# Patient Record
Sex: MALE | Race: WHITE | NOT HISPANIC OR LATINO | Employment: OTHER | URBAN - NONMETROPOLITAN AREA
[De-identification: names, ages, dates, MRNs, and addresses within clinical notes are randomized per-mention and may not be internally consistent; named-entity substitution may affect disease eponyms.]

---

## 2020-01-16 ENCOUNTER — OFFICE VISIT (OUTPATIENT)
Dept: CARDIOLOGY CLINIC | Facility: CLINIC | Age: 70
End: 2020-01-16
Payer: MEDICARE

## 2020-01-16 VITALS
BODY MASS INDEX: 32.54 KG/M2 | WEIGHT: 253.53 LBS | SYSTOLIC BLOOD PRESSURE: 128 MMHG | HEIGHT: 74 IN | DIASTOLIC BLOOD PRESSURE: 62 MMHG | HEART RATE: 82 BPM

## 2020-01-16 DIAGNOSIS — E78.2 MIXED HYPERLIPIDEMIA: ICD-10-CM

## 2020-01-16 DIAGNOSIS — I25.10 CORONARY ARTERY DISEASE INVOLVING NATIVE CORONARY ARTERY OF NATIVE HEART WITHOUT ANGINA PECTORIS: Primary | ICD-10-CM

## 2020-01-16 DIAGNOSIS — Z95.1 S/P CABG (CORONARY ARTERY BYPASS GRAFT): ICD-10-CM

## 2020-01-16 DIAGNOSIS — E66.9 OBESITY (BMI 30-39.9): ICD-10-CM

## 2020-01-16 DIAGNOSIS — I48.0 PAF (PAROXYSMAL ATRIAL FIBRILLATION) (HCC): ICD-10-CM

## 2020-01-16 DIAGNOSIS — I34.0 NON-RHEUMATIC MITRAL REGURGITATION: ICD-10-CM

## 2020-01-16 DIAGNOSIS — I65.21 CAROTID OCCLUSION, RIGHT: ICD-10-CM

## 2020-01-16 DIAGNOSIS — I10 HYPERTENSION, UNSPECIFIED TYPE: ICD-10-CM

## 2020-01-16 PROCEDURE — 99214 OFFICE O/P EST MOD 30 MIN: CPT | Performed by: INTERNAL MEDICINE

## 2020-01-16 RX ORDER — FINASTERIDE 5 MG/1
TABLET, FILM COATED ORAL
COMMUNITY
Start: 2015-01-27

## 2020-01-16 RX ORDER — POTASSIUM CHLORIDE 750 MG/1
CAPSULE, EXTENDED RELEASE ORAL
COMMUNITY
Start: 2016-08-04 | End: 2020-02-06

## 2020-01-16 RX ORDER — SOTALOL HYDROCHLORIDE 120 MG/1
TABLET ORAL
COMMUNITY
Start: 2017-08-01 | End: 2020-02-10 | Stop reason: CLARIF

## 2020-01-16 RX ORDER — ATORVASTATIN CALCIUM 40 MG/1
TABLET, FILM COATED ORAL
COMMUNITY
Start: 2019-10-29 | End: 2021-02-15 | Stop reason: SDUPTHER

## 2020-01-16 RX ORDER — ALLOPURINOL 300 MG/1
300 TABLET ORAL DAILY
COMMUNITY
Start: 2013-01-16

## 2020-01-16 RX ORDER — POTASSIUM CHLORIDE 750 MG/1
10 CAPSULE, EXTENDED RELEASE ORAL DAILY
COMMUNITY
Start: 2019-09-16 | End: 2020-01-16 | Stop reason: CLARIF

## 2020-01-16 RX ORDER — NITROGLYCERIN 0.4 MG/1
TABLET SUBLINGUAL
COMMUNITY
Start: 2014-12-31

## 2020-01-16 RX ORDER — RANITIDINE 150 MG/1
150 TABLET ORAL
COMMUNITY
End: 2021-01-14 | Stop reason: ALTCHOICE

## 2020-01-16 RX ORDER — TAMSULOSIN HYDROCHLORIDE 0.4 MG/1
CAPSULE ORAL
COMMUNITY
Start: 2018-10-25

## 2020-01-16 RX ORDER — CLOPIDOGREL BISULFATE 75 MG/1
TABLET ORAL
COMMUNITY
Start: 2014-02-11 | End: 2021-01-14 | Stop reason: SDUPTHER

## 2020-01-16 RX ORDER — ACETAMINOPHEN 500 MG
TABLET ORAL
COMMUNITY

## 2020-01-16 RX ORDER — PANTOPRAZOLE SODIUM 40 MG/1
TABLET, DELAYED RELEASE ORAL
COMMUNITY
Start: 2014-12-31 | End: 2020-03-09

## 2020-01-16 RX ORDER — WARFARIN SODIUM 5 MG/1
TABLET ORAL
COMMUNITY
Start: 2016-08-04 | End: 2020-01-16 | Stop reason: CLARIF

## 2020-01-16 NOTE — PROGRESS NOTES
Cardiology Office Visit    Olive Kirk  [id number]  1950     [facility] Tracie Downing  [id number] [facility] [address]  [phone]      Dear Tom Escobar MD,    I had the pleasure of seeing your patient at our [address] [place] [address] office today 1/16/2020  As you know he is a pleasant 68y o  year old male with a medical history as described below  Patient previously followed with me at the [address] [place] [address] and at [facility]  Reason for office visit: Reestablish care for coronary artery disease, atrial fibrillation, hypertension, hyperlipidemia, mitral regurgitation and carotid artery disease  1  Coronary artery disease involving native coronary artery of native heart without angina pectoris  Assessment & Plan:  Coronary artery disease:  A  Non ST segment elevation myocardial infarction 10/01/2008  B   2 Taxus drug-eluting stents to the right coronary artery 10/01/2008  C  Moderate left main disease on cardiac catheterization 2008, 2012 and 2014  D  Cardiac catheterization 06/29/2016 with FFR less than 0 75 with left main 50% stenotic  E   CABG x2  LIMA-LAD  SVG-OM 7/29/16    Patient denies any cardiac symptoms at present  He denies chest pain or change in baseline dyspnea on exertion  Nuclear stress test 5/4/18 showed no evidence of ischemia  He is currently on plavix and eliquis so he is not maintained on aspirin  Will plan surveillance stress test around 5/2021 unless symptoms warrant earlier testing  2  S/P CABG (coronary artery bypass graft)  Assessment & Plan:  CABG x 2: LIMA-LAD  SVG-OM 7/29/16  [facility]        3  Hypertension, unspecified type  Assessment & Plan:  Blood pressure is well controlled  Continue current medications  Orders:  -     POCT ECG    4  Mixed hyperlipidemia  Assessment & Plan:  Atorvastatin 40 mg  Lipid panel 11/4/19 reviewed and discussed with patient    LDL 49 and at goal        5  PAF (paroxysmal atrial fibrillation) ([facility] [address] )  Assessment & Plan:  He remains in normal sinus rhythm on Sotalol 120 mg twice daily  ECG today showed normal sinus rhythm  He is now on Eliquis anticoagulation  He follows with EP (Dr Bertha Gutierrez) at [facility] and was last seen 6/2019  6  Non-rheumatic mitral regurgitation  Assessment & Plan:  Echocardiogram 12/22/16 showed mild to moderate central mitral regurgitation  Will plan repeat echocardiogram      Orders:  -     Echo complete with contrast if indicated; Future; Expected date: 01/16/2020    7  Carotid occlusion, right  Assessment & Plan:  Carotid Doppler 06/05/2017 with known occlusion of the right internal carotid artery  50 to 69% stenosis of the left internal carotid artery  Patient is being followed by vascular surgery at [facility] (Dr Ester Alves) with last office visit 11/20/19 and updated carotid Doppler done that day  Repeat carotid in 1 year if stable 2 year follow up with vascular per their recommendations  Continue aggressive medical therapy  Discussed signs and symptoms of stroke with the patient previously  8  Obesity (BMI 30-39  9)  Assessment & Plan:  BMI 33  Discussed the importance of resuming exercise program and trying to maintain a healthy diet  HPI   Patient with history of coronary artery disease dating back to 10/2008 when he had DEIRDRE x 2 placed to an occluded right coronary artery  He ultimately underwent CABG x 2 with LIMA-LAD and SVG-OM 7/29/16  He had post operative atrial fibrillation and follows with Dr Bertha Gutierrez at [facility] for EP  He was last seen 6/2019  He is on sotalol  He has known carotid occlusion for which he follows with Dr Ester Alves (vascular surgery)  He was last seen 11/20/19  I had last seen the patient 7/16/19 at which time he was doing well   He did have some concerns about heart rate elevation with activity which we discussed was normal  I had made no changes to his regimen  1/16/2020: Patient returns today for follow up/reestablish care  He has done well since his last office visit  He was seen by Dr Andrew Loja 11/2019 and 1 year follow up carotid was planned  He denies any chest pain  Stable dyspnea on exertion  No lightheadedness or dizziness  He remains fairly active although not exercising as much as he was previously  He has transitioned to Eliquis from coumadin/warfarin despite it being very costly to him  Patient Active Problem List   Diagnosis    Carotid occlusion, right    CAD (coronary artery disease)    Hypertension    Hyperlipidemia    Non-rheumatic mitral regurgitation    Obesity (BMI 30-39  9)    [facility]oxysmal [facility] fibrillation) ([facility]    S/P CABG (coronary artery bypass graft)     Past Medical History:   Diagnosis Date    Atrial fibrillation (HCC)     Carotid occlusion, right     Coronary artery disease     GERD (gastroesophageal reflux disease)     Hyperlipidemia     Hypertension     Myocardial infarct [facility])      Social History     Socioeconomic History    Marital status: /Civil Union     Spouse name: Not on file    Number of children: Not on file    Years of education: Not on file    Highest education level: Not on file   Occupational History    Occupation: Retired   Social Needs    Financial resource strain: Not on file    Food insecurity:     Worry: Not on file     Inability: Not on file   Brightblue needs:     Medical: Not on file     Non-medical: Not on file   Tobacco Use    Smoking status: Former Smoker    Smokeless tobacco: Never Used   Substance and Sexual Activity    Alcohol use:  Yes     Alcohol/week: 1 0 standard drinks     Types: 1 Cans of beer per week     Frequency: Monthly or less     Drinks per session: 1 or 2     Binge frequency: Less than monthly    Drug use: Never    Sexual activity: Not on file     Comment: Defer   Lifestyle    Physical activity:     Days per week: Not on file     Minutes per session: Not on file    Stress: Not on file   Relationships    Social connections:     Talks on phone: Not on file     Gets together: Not on file     Attends Holiness service: Not on file     Active member of club or organization: Not on file     Attends meetings of clubs or organizations: Not on file     Relationship status: Not on file    Intimate partner violence:     Fear of current or ex partner: Not on file     Emotionally abused: Not on file     Physically abused: Not on file     Forced sexual activity: Not on file   Other Topics Concern    Not on file   Social History Narrative    Not on file      Family History   Problem Relation Age of Onset    Heart disease Mother     Heart failure Father      Past Surgical History:   Procedure Laterality Date    CARDIAC CATHETERIZATION  10/01/2008    RCA with 100% subacute occlusion  DEIRDRE x2 to RCA  Ostial left main disease with FFR 0 78      CARDIAC CATHETERIZATION  03/16/2012    Moderate 50% ostial left main  FFR 0 82  Distal RCA 40%  No intervention   CARDIAC CATHETERIZATION  11/24/2014    Ostial left main 60%  Ostial left circumflex 70%   CARDIAC CATHETERIZATION  06/29/2016    Left main 50%  FFR was less than 0 75  CABG recommended   CARDIOVERSION  07/01/2017    CEREBRAL ANGIOGRAM  12/12/2013    Carotid angiography  Left internal carotid artery 40%  Right internal carotid artery 100% occlusion   CHOLECYSTECTOMY      CORONARY ARTERY BYPASS GRAFT  07/29/2016    LIMA-LAD  SVG-OM       LUMBAR DISC SURGERY      TONSILLECTOMY             Current Outpatient Medications:     acetaminophen (TYLENOL) 500 mg tablet, Take by mouth, Disp: , Rfl:     allopurinol (ZYLOPRIM) 300 mg tablet, Take 300 mg by mouth daily , Disp: , Rfl:     apixaban (ELIQUIS) 5 mg, Take 5 mg by mouth 2 (two) times a day, Disp: , Rfl:     atorvastatin (LIPITOR) 40 mg tablet, , Disp: , Rfl:     Cholecalciferol (VITAMIN D3 HIGH POTENCY PO), Take by mouth, Disp: , Rfl:    clopidogrel (PLAVIX) 75 mg tablet, Take by mouth, Disp: , Rfl:     finasteride (PROSCAR) 5 mg tablet, Take by mouth, Disp: , Rfl:     nitroglycerin (NITROSTAT) 0 4 mg SL tablet, , Disp: , Rfl:     pantoprazole (PROTONIX) 40 mg tablet, Take by mouth, Disp: , Rfl:     potassium chloride (MICRO-K) 10 MEQ CR capsule, , Disp: , Rfl:     ranitidine (ZANTAC) 150 mg tablet, Take 150 mg by mouth, Disp: , Rfl:     sotalol AF (BETAPACE AF) 120 MG TABS, Take by mouth, Disp: , Rfl:     tamsulosin (FLOMAX) 0 4 mg, Take by mouth, Disp: , Rfl:   No Known Allergies      Test Results:       Cv Stress Nuclear Pharm: Result Date: 5/4/2018  No evidence of ECG changes to suggest ischemia  · The patient reported shortness of breath during the stress test  · Blood pressure demonstrated a hypotensive response to stress  · There is a small to moderate sized non-reversible (scar/infarct) defect of mild severity present in the basal anterior and basal inferior wall(s), mostly normalized with CT correction, consistent with diaphragmatic attenuation  · Left ventricular perfusion is probably normal  No ischemia · This is a low risk study  Arterial duplex 11/06/2013:  No abdominal aortic aneurysm   No significant aortoiliac disease  Stress echocardiogram 06/02/2016:  Fernando   7 minutes 42 seconds   8 3 Mets   Hypotension with exercise   66% of maximum predicted heart rate   Frequent PACs and PVCs   Atrial tachycardia   No ischemia at submaximal heart rate      Carotid duplex 06/05/2017:  Occluded right internal carotid artery   50 to 69% left internal carotid artery stenosis    Twelve month follow-up recommended      Echocardiogram 12/22/2016:  Mildly dilated LV   EF 55%   Abnormal septal motion   Mild concentric left ventricular hypertrophy   Mildly enlarged RV with normal RV systolic function   Mild right atrial enlargement/left atrial enlargement   Aortic sclerosis   No AI   MAC   Mild to moderate central MR   Mild TR   Top-normal PASP 39 mmHg  ECG: Normal sinus rhythm with first degree AV block  Lipid panel 11/4/19: Cholesterol 114  Triglycerides 116  HDL 42  LDL 49  Review of Systems:    Review of Systems   Constitutional: Negative for activity change, appetite change and fatigue  HENT: Negative for congestion, hearing loss, tinnitus and trouble swallowing  Eyes: Negative for visual disturbance  Respiratory: Positive for shortness of breath (stable dyspnea on exertion)  Negative for cough, chest tightness and wheezing  Cardiovascular: Negative for chest pain, palpitations and leg swelling  Gastrointestinal: Negative for abdominal distention, abdominal pain, nausea and vomiting  Genitourinary: Negative for difficulty urinating  Musculoskeletal: Negative for arthralgias  Skin: Negative for rash  Neurological: Negative for dizziness, syncope and light-headedness  Hematological: Does not bruise/bleed easily  Psychiatric/Behavioral: Negative for confusion  The patient is not nervous/anxious  Vitals:    01/16/20 1516 01/16/20 1608   BP: 112/70 128/62   Pulse: 82    Weight: 115 kg (253 lb 8 5 oz)    Height: 6' 2" (1 88 m)      Vitals:    01/16/20 1516   Weight: 115 kg (253 lb 8 5 oz)     Height: 6' 2" (188 cm)   Body mass index is 32 55 kg/m²  Physical Exam   Constitutional: He is oriented to person, place, and time  He appears well-developed and well-nourished  HENT:   Head: Normocephalic and atraumatic  Eyes: Pupils are equal, round, and reactive to light  Conjunctivae are normal    Neck: Normal range of motion  No JVD present  Cardiovascular: Normal rate, regular rhythm and normal heart sounds  Exam reveals no gallop and no friction rub  No murmur heard  Pulmonary/Chest: Effort normal and breath sounds normal    Abdominal: Soft  Bowel sounds are normal    Musculoskeletal: He exhibits edema (trace)  Neurological: He is alert and oriented to person, place, and time     Skin: Skin is warm and dry  Psychiatric: He has a normal mood and affect  His behavior is normal    Vitals reviewed

## 2020-01-17 PROCEDURE — 93000 ELECTROCARDIOGRAM COMPLETE: CPT | Performed by: INTERNAL MEDICINE

## 2020-01-17 NOTE — ASSESSMENT & PLAN NOTE
Carotid Doppler 06/05/2017 with known occlusion of the right internal carotid artery  50 to 69% stenosis of the left internal carotid artery  Patient is being followed by vascular surgery at [facility] (Dr Kelvin Godinez) with last office visit 11/20/19 and updated carotid Doppler done that day  Repeat carotid in 1 year if stable 2 year follow up with vascular per their recommendations  Continue aggressive medical therapy  Discussed signs and symptoms of stroke with the patient previously

## 2020-01-17 NOTE — ASSESSMENT & PLAN NOTE
BMI 33  Discussed the importance of resuming exercise program and trying to maintain a healthy diet

## 2020-01-17 NOTE — ASSESSMENT & PLAN NOTE
Coronary artery disease:  A  Non ST segment elevation myocardial infarction 10/01/2008  B   2 Taxus drug-eluting stents to the right coronary artery 10/01/2008  C  Moderate left main disease on cardiac catheterization 2008, 2012 and 2014  D  Cardiac catheterization 06/29/2016 with FFR less than 0 75 with left main 50% stenotic  E   CABG x2  LIMA-LAD  SVG-OM 7/29/16    Patient denies any cardiac symptoms at present  He denies chest pain or change in baseline dyspnea on exertion  Nuclear stress test 5/4/18 showed no evidence of ischemia  He is currently on plavix and eliquis so he is not maintained on aspirin  Will plan surveillance stress test around 5/2021 unless symptoms warrant earlier testing

## 2020-01-17 NOTE — ASSESSMENT & PLAN NOTE
Echocardiogram 12/22/16 showed mild to moderate central mitral regurgitation     Will plan repeat echocardiogram

## 2020-01-17 NOTE — ASSESSMENT & PLAN NOTE
He remains in normal sinus rhythm on Sotalol 120 mg twice daily  ECG today showed normal sinus rhythm  He is now on Eliquis anticoagulation  He follows with EP (Dr Aditi Meneses) at [facility] and was last seen 6/2019

## 2020-02-06 DIAGNOSIS — I10 HYPERTENSION, UNSPECIFIED TYPE: Primary | ICD-10-CM

## 2020-02-06 RX ORDER — POTASSIUM CHLORIDE 750 MG/1
10 CAPSULE, EXTENDED RELEASE ORAL DAILY
Qty: 90 CAPSULE | Refills: 3 | Status: SHIPPED | OUTPATIENT
Start: 2020-02-06 | End: 2022-05-10 | Stop reason: SDUPTHER

## 2020-02-07 DIAGNOSIS — I48.0 PAF (PAROXYSMAL ATRIAL FIBRILLATION) (HCC): Primary | ICD-10-CM

## 2020-02-10 RX ORDER — SOTALOL HYDROCHLORIDE 120 MG/1
TABLET ORAL
Qty: 180 TABLET | Refills: 3 | Status: SHIPPED | OUTPATIENT
Start: 2020-02-10 | End: 2020-12-28

## 2020-02-17 ENCOUNTER — HOSPITAL ENCOUNTER (OUTPATIENT)
Dept: NON INVASIVE DIAGNOSTICS | Facility: HOSPITAL | Age: 70
Discharge: HOME | End: 2020-02-17
Attending: INTERNAL MEDICINE
Payer: MEDICARE

## 2020-02-17 DIAGNOSIS — I34.0 NON-RHEUMATIC MITRAL REGURGITATION: ICD-10-CM

## 2020-02-17 PROCEDURE — 93306 TTE W/DOPPLER COMPLETE: CPT | Performed by: INTERNAL MEDICINE

## 2020-02-17 PROCEDURE — 93306 TTE W/DOPPLER COMPLETE: CPT

## 2020-03-07 DIAGNOSIS — I10 ESSENTIAL HYPERTENSION: Primary | ICD-10-CM

## 2020-03-07 DIAGNOSIS — K21.9 GASTROESOPHAGEAL REFLUX DISEASE WITHOUT ESOPHAGITIS: ICD-10-CM

## 2020-03-09 RX ORDER — PANTOPRAZOLE SODIUM 40 MG/1
40 TABLET, DELAYED RELEASE ORAL DAILY
Qty: 90 TABLET | Refills: 3 | Status: SHIPPED | OUTPATIENT
Start: 2020-03-09 | End: 2020-12-23

## 2020-06-05 DIAGNOSIS — I48.0 PAF (PAROXYSMAL ATRIAL FIBRILLATION) (HCC): Primary | ICD-10-CM

## 2020-06-08 RX ORDER — APIXABAN 5 MG/1
TABLET, FILM COATED ORAL
Qty: 180 TABLET | Refills: 3 | Status: SHIPPED | OUTPATIENT
Start: 2020-06-08 | End: 2021-07-09

## 2020-07-14 ENCOUNTER — OFFICE VISIT (OUTPATIENT)
Dept: CARDIOLOGY CLINIC | Facility: CLINIC | Age: 70
End: 2020-07-14
Payer: MEDICARE

## 2020-07-14 VITALS
DIASTOLIC BLOOD PRESSURE: 70 MMHG | BODY MASS INDEX: 33.24 KG/M2 | HEART RATE: 82 BPM | SYSTOLIC BLOOD PRESSURE: 142 MMHG | WEIGHT: 259 LBS | OXYGEN SATURATION: 98 % | HEIGHT: 74 IN

## 2020-07-14 DIAGNOSIS — I10 ESSENTIAL HYPERTENSION: ICD-10-CM

## 2020-07-14 DIAGNOSIS — I25.10 CORONARY ARTERY DISEASE INVOLVING NATIVE CORONARY ARTERY OF NATIVE HEART WITHOUT ANGINA PECTORIS: Primary | ICD-10-CM

## 2020-07-14 DIAGNOSIS — I65.21 CAROTID OCCLUSION, RIGHT: ICD-10-CM

## 2020-07-14 DIAGNOSIS — E78.2 MIXED HYPERLIPIDEMIA: ICD-10-CM

## 2020-07-14 DIAGNOSIS — E66.9 OBESITY (BMI 30-39.9): ICD-10-CM

## 2020-07-14 DIAGNOSIS — I48.0 PAF (PAROXYSMAL ATRIAL FIBRILLATION) (HCC): ICD-10-CM

## 2020-07-14 DIAGNOSIS — Z95.1 S/P CABG (CORONARY ARTERY BYPASS GRAFT): ICD-10-CM

## 2020-07-14 DIAGNOSIS — I34.0 NON-RHEUMATIC MITRAL REGURGITATION: ICD-10-CM

## 2020-07-14 PROCEDURE — 99214 OFFICE O/P EST MOD 30 MIN: CPT | Performed by: INTERNAL MEDICINE

## 2020-07-14 NOTE — PROGRESS NOTES
Cardiology Office Visit    Noman Garcia  [id number]  1950    [facility]  [address] [place] [facility] [address] [phone]      Dear Nixon Davis MD,    I had the pleasure of seeing your patient at our [address] [place] [address] office today 7/14/2020  As you know he is a pleasant 65y o  year old male with a medical history as described below  Reason for office visit: Follow for coronary artery disease, atrial fibrillation, hypertension, hyperlipidemia, mitral regurgitation and carotid artery disease  1  Coronary artery disease involving native coronary artery of native heart without angina pectoris  Assessment & Plan:  Coronary artery disease:  A  Non ST segment elevation myocardial infarction 10/01/2008  B   2 Taxus drug-eluting stents to the right coronary artery 10/01/2008  C  Moderate left main disease on cardiac catheterization 2008, 2012 and 2014  D  Cardiac catheterization 06/29/2016 with FFR less than 0 75 with left main 50% stenotic  E   CABG x 2  LIMA-LAD  SVG-OM 7/29/16  Patient denies any cardiac symptoms at present  He denies chest pain or change in baseline dyspnea on exertion  Nuclear stress test 5/4/18 showed no evidence of ischemia  He is currently on plavix and eliquis so he is not maintained on aspirin  Continue atorvastatin for goal LDL less than 70  Will plan surveillance stress test around 5/2021 unless symptoms warrant earlier testing  2  S/P CABG (coronary artery bypass graft)  Assessment & Plan:  CABG x 2: LIMA-LAD  SVG-OM 7/29/16  [facility]        3  Essential hypertension  Assessment & Plan:  Systolic blood pressure is elevated on exam   I have asked the patient to intermittently monitor at home and to call me if systolic blood pressures consistently above 145  If blood pressure remains elevated we may need to consider additional antihypertensive        4  Mixed hyperlipidemia  Assessment & Plan:  Atorvastatin 40 mg  Lipid panel 11/4/19 reviewed and discussed with patient  LDL 49 and at goal        5  PAF (paroxysmal atrial fibrillation) ([facility])  Assessment & Plan:  He remains in normal sinus rhythm on Sotalol 120 mg twice daily  ECG at last office visit showed normal sinus rhythm  He is  on Eliquis anticoagulation  He follows with EP (Dr Melo Atkins) at [facility] and was last seen 5/22/2020 at which time no changes are made to the patient's regimen  6  Non-rheumatic mitral regurgitation  Assessment & Plan:  Echocardiogram 12/22/16 showed mild to moderate central mitral regurgitation  Repeat echocardiogram 02/17/2020 showed mild central mitral regurgitation  Will plan repeat echo in approximately 1 year given discrepancy of regurgitation  7  Carotid occlusion, right  Assessment & Plan:  Carotid Doppler 06/05/2017 with known occlusion of the right internal carotid artery  50 to 69% stenosis of the left internal carotid artery  Patient is being followed by vascular surgery at [facility] (Dr Frank Dodge) with last office visit 11/20/19 and updated carotid Doppler done that day  Repeat carotid in 1 year if stable 2 year follow up with vascular per their recommendations  Continue aggressive medical therapy  Discussed signs and symptoms of stroke with the patient previously  8  Obesity (BMI 30-39  9)  Assessment & Plan:  BMI 33  Discussed the importance of resuming exercise program and trying to maintain a healthy diet  HPI     Patient with history of coronary artery disease dating back to 10/2008 when he had DEIRDRE x 2 placed to an occluded right coronary artery  He ultimately underwent CABG x 2 with LIMA-LAD and SVG-OM 7/29/16  He had post operative atrial fibrillation and follows with Dr Melo Atkins at [facility] for EP  He was last seen 6/2019  He is on sotalol   He has known carotid occlusion for which he follows with Dr Jennifer Gabriel (vascular surgery)  He was last seen 11/20/19  I had last seen the patient 1/16/2020 at which time he was doing well  He denied any chest pain  He did note stable dyspnea on exertion  No lightheadedness or dizziness  He had transitioned to Eliquis from Coumadin/warfarin  I had recommended updated echocardiogram given history of moderate mitral regurgitation  Echocardiogram 2/17/2020 showed mild central mitral regurgitation  EF 60-65%  7/14/2020:  Patient returns to the office today for follow-up  He denies any chest pain  He denies any palpitations  He denies any lightheadedness and dizziness  Patient was seen by Ana Tirado 5/22/2020  No changes were made to his regimen  ECG at that time showed normal sinus rhythm per report  Patient is having a lot of knee pain  He reports that this has decreased his activity level  Patient Active Problem List   Diagnosis    Carotid occlusion, right    CAD (coronary artery disease)    Hypertension    Hyperlipidemia    Non-rheumatic mitral regurgitation    Obesity (BMI 30-39  9)    PAF (paroxysmal atrial fibrillation) (HCC)    S/P CABG (coronary artery bypass graft)     Past Medical History:   Diagnosis Date    Atrial fibrillation (HCC)     Carotid occlusion, right     Coronary artery disease     GERD (gastroesophageal reflux disease)     Hyperlipidemia     Hypertension     Myocardial infarct [facility])      Social History     Socioeconomic History    Marital status: /Civil Union     Spouse name: Not on file    Number of children: Not on file    Years of education: Not on file    Highest education level: Not on file   Occupational History    Occupation: Retired   Social Needs    Financial resource strain: Not on file    Food insecurity:     Worry: Not on file     Inability: Not on file   [employer] needs:     Medical: Not on file     Non-medical: Not on file   Tobacco Use    Smoking status: Former Smoker  Smokeless tobacco: Never Used   Substance and Sexual Activity    Alcohol use: Yes     Alcohol/week: 1 0 standard drinks     Types: 1 Cans of beer per week     Frequency: Monthly or less     Drinks per session: 1 or 2     Binge frequency: Less than monthly    Drug use: Never    Sexual activity: Not on file     Comment: Defer   Lifestyle    Physical activity:     Days per week: Not on file     Minutes per session: Not on file    Stress: Not on file   Relationships    Social connections:     Talks on phone: Not on file     Gets together: Not on file     Attends Rastafari service: Not on file     Active member of club or organization: Not on file     Attends meetings of clubs or organizations: Not on file     Relationship status: Not on file    Intimate partner violence:     Fear of current or ex partner: Not on file     Emotionally abused: Not on file     Physically abused: Not on file     Forced sexual activity: Not on file   Other Topics Concern    Not on file   Social History Narrative    Not on file      Family History   Problem Relation Age of Onset    Heart disease Mother     Heart failure Father      Past Surgical History:   Procedure Laterality Date    CARDIAC CATHETERIZATION  10/01/2008    RCA with 100% subacute occlusion  DEIRDRE x2 to RCA  Ostial left main disease with FFR 0 78      CARDIAC CATHETERIZATION  03/16/2012    Moderate 50% ostial left main  FFR 0 82  Distal RCA 40%  No intervention   CARDIAC CATHETERIZATION  11/24/2014    Ostial left main 60%  Ostial left circumflex 70%   CARDIAC CATHETERIZATION  06/29/2016    Left main 50%  FFR was less than 0 75  CABG recommended   CARDIOVERSION  07/01/2017    CEREBRAL ANGIOGRAM  12/12/2013    Carotid angiography  Left internal carotid artery 40%  Right internal carotid artery 100% occlusion   CHOLECYSTECTOMY      CORONARY ARTERY BYPASS GRAFT  07/29/2016    LIMA-LAD  SVG-OM       LUMBAR DISC SURGERY      TONSILLECTOMY Current Outpatient Medications:     acetaminophen (TYLENOL) 500 mg tablet, Take by mouth, Disp: , Rfl:     allopurinol (ZYLOPRIM) 300 mg tablet, Take 300 mg by mouth daily , Disp: , Rfl:     atorvastatin (LIPITOR) 40 mg tablet, , Disp: , Rfl:     Cholecalciferol (VITAMIN D3 HIGH POTENCY PO), Take by mouth, Disp: , Rfl:     clopidogrel (PLAVIX) 75 mg tablet, Take by mouth, Disp: , Rfl:     ELIQUIS 5 MG, TAKE 1 TABLET BY MOUTH EVERY 12 HOURS  (DISCONTINUE WARFARIN), Disp: 180 tablet, Rfl: 3    finasteride (PROSCAR) 5 mg tablet, Take by mouth, Disp: , Rfl:     nitroglycerin (NITROSTAT) 0 4 mg SL tablet, , Disp: , Rfl:     pantoprazole (PROTONIX) 40 mg tablet, Take 1 tablet (40 mg total) by mouth daily, Disp: 90 tablet, Rfl: 3    potassium chloride (MICRO-K) 10 MEQ CR capsule, Take 1 capsule (10 mEq total) by mouth daily, Disp: 90 capsule, Rfl: 3    sotalol (BETAPACE) 120 mg tablet, TAKE 1 TABLET (120 MG TOTAL) BY MOUTH TWICE A DAY , Disp: 180 tablet, Rfl: 3    tamsulosin (FLOMAX) 0 4 mg, Take by mouth, Disp: , Rfl:     ranitidine (ZANTAC) 150 mg tablet, Take 150 mg by mouth, Disp: , Rfl:   No Known Allergies    Test Results:     Echocardiogram 02/17/2020:  Mildly dilated left ventricle with normal left ventricular systolic function  EF estimated at 60-65%  Mild concentric left ventricular hypertrophy  Abnormal septal motion likely due to known cardiac surgery  Mild diastolic dysfunction  Moderately dilated right ventricle with normal right ventricular systolic function  Mild left and right atrial enlargement  Aortic sclerosis without stenosis  Mitral annular calcification  Mild central mitral regurgitation  Mild tricuspid regurgitation  Estimated PASP 30 mmHg         Cv Stress Nuclear Pharm: Result Date: 5/4/2018  No evidence of ECG changes to suggest ischemia  · The patient reported shortness of breath during the stress test  · Blood pressure demonstrated a hypotensive response to stress   · There is a small to moderate sized non-reversible (scar/infarct) defect of mild severity present in the basal anterior and basal inferior wall(s), mostly normalized with CT correction, consistent with diaphragmatic attenuation  · Left ventricular perfusion is probably normal  No ischemia · This is a low risk study  Arterial duplex 11/06/2013:  No abdominal aortic aneurysm   No significant aortoiliac disease  Stress echocardiogram 06/02/2016:  Fernando   7 minutes 42 seconds   8 3 Mets   Hypotension with exercise   66% of maximum predicted heart rate   Frequent PACs and PVCs   Atrial tachycardia   No ischemia at submaximal heart rate      Carotid duplex 06/05/2017:  Occluded right internal carotid artery  50 to 69% left internal carotid artery stenosis    Twelve month follow-up recommended      Echocardiogram 12/22/2016:  Mildly dilated LV   EF 55%   Abnormal septal motion   Mild concentric left ventricular hypertrophy   Mildly enlarged RV with normal RV systolic function   Mild right atrial enlargement/left atrial enlargement   Aortic sclerosis   No AI   MAC   Mild to moderate central MR   Mild TR   Top-normal PASP 39 mmHg         ECG: Normal sinus rhythm with first degree AV block      Lipid panel 11/4/19: Cholesterol 114  Triglycerides 116  HDL 42  LDL 49  Review of Systems:    Review of Systems   Constitutional: Negative for activity change, appetite change and fatigue  HENT: Negative for congestion, hearing loss, tinnitus and trouble swallowing  Eyes: Negative for visual disturbance  Respiratory: Negative for cough, chest tightness, shortness of breath and wheezing  Cardiovascular: Negative for chest pain, palpitations and leg swelling  Gastrointestinal: Negative for abdominal distention, abdominal pain, nausea and vomiting  Genitourinary: Negative for difficulty urinating  Musculoskeletal: Positive for arthralgias  Skin: Negative for rash     Neurological: Negative for dizziness, syncope and light-headedness  Hematological: Does not bruise/bleed easily  Psychiatric/Behavioral: Negative for confusion  The patient is not nervous/anxious  All other systems reviewed and are negative  Vitals:    07/14/20 0902 07/14/20 0947   BP: 132/70 142/70   BP Location: Left arm    Patient Position: Sitting    Cuff Size: Standard    Pulse: 82    SpO2: 98%    Weight: 117 kg (259 lb)    Height: 6' 2" (1 88 m)      Vitals:    07/14/20 0902   Weight: 117 kg (259 lb)     Height: 6' 2" (188 cm)   Body mass index is 33 25 kg/m²  Physical Exam   Constitutional: He is oriented to person, place, and time  He appears well-developed and well-nourished  HENT:   Head: Normocephalic and atraumatic  Eyes: Pupils are equal, round, and reactive to light  Conjunctivae are normal    Neck: Normal range of motion  No JVD present  Cardiovascular: Normal rate, regular rhythm and normal heart sounds  Exam reveals no gallop and no friction rub  No murmur heard  Pulmonary/Chest: Effort normal and breath sounds normal    Abdominal: Soft  Bowel sounds are normal    Musculoskeletal: He exhibits edema (Trace)  Neurological: He is alert and oriented to person, place, and time  Skin: Skin is warm and dry  Psychiatric: He has a normal mood and affect   His behavior is normal

## 2020-07-14 NOTE — PATIENT INSTRUCTIONS
I would recommend continuing current medications without change  Echocardiogram 02/17/2020 showed normal heart function and only mild mitral regurgitation (previously noted to be moderate)  Blood pressure is mildly elevated on exam   I would recommend intermittently monitoring blood pressure  Call me if blood pressure consistently above 140 as we can adjust medications over the phone  If you happen to be in the area, let me know and I can see if we have any Eliquis samples that you can stop in for

## 2020-07-15 NOTE — ASSESSMENT & PLAN NOTE
Systolic blood pressure is elevated on exam   I have asked the patient to intermittently monitor at home and to call me if systolic blood pressures consistently above 145  If blood pressure remains elevated we may need to consider additional antihypertensive

## 2020-07-15 NOTE — ASSESSMENT & PLAN NOTE
Carotid Doppler 06/05/2017 with known occlusion of the right internal carotid artery  50 to 69% stenosis of the left internal carotid artery  Patient is being followed by vascular surgery at [facility] (Dr Li Servin) with last office visit 11/20/19 and updated carotid Doppler done that day  Repeat carotid in 1 year if stable 2 year follow up with vascular per their recommendations  Continue aggressive medical therapy  Discussed signs and symptoms of stroke with the patient previously

## 2020-07-15 NOTE — ASSESSMENT & PLAN NOTE
Echocardiogram 12/22/16 showed mild to moderate central mitral regurgitation  Repeat echocardiogram 02/17/2020 showed mild central mitral regurgitation  Will plan repeat echo in approximately 1 year given discrepancy of regurgitation

## 2020-07-15 NOTE — ASSESSMENT & PLAN NOTE
He remains in normal sinus rhythm on Sotalol 120 mg twice daily  ECG at last office visit showed normal sinus rhythm  He is  on Eliquis anticoagulation  He follows with EP (Dr Jose Murillo) at [facility] and was last seen 5/22/2020 at which time no changes are made to the patient's regimen

## 2020-07-15 NOTE — ASSESSMENT & PLAN NOTE
Coronary artery disease:  A  Non ST segment elevation myocardial infarction 10/01/2008  B   2 Taxus drug-eluting stents to the right coronary artery 10/01/2008  C  Moderate left main disease on cardiac catheterization 2008, 2012 and 2014  D  Cardiac catheterization 06/29/2016 with FFR less than 0 75 with left main 50% stenotic  E   CABG x 2  LIMA-LAD  SVG-OM 7/29/16  Patient denies any cardiac symptoms at present  He denies chest pain or change in baseline dyspnea on exertion  Nuclear stress test 5/4/18 showed no evidence of ischemia  He is currently on plavix and eliquis so he is not maintained on aspirin  Continue atorvastatin for goal LDL less than 70  Will plan surveillance stress test around 5/2021 unless symptoms warrant earlier testing

## 2020-08-11 ENCOUNTER — TELEPHONE (OUTPATIENT)
Dept: CARDIOLOGY CLINIC | Facility: CLINIC | Age: 70
End: 2020-08-11

## 2020-08-11 NOTE — TELEPHONE ENCOUNTER
Patient called  He is scheduled for an endoscopy in the near future  Wants to get the okay to stop Eliquis one day prior and Plavix three days prior to procedure  Please advise

## 2020-08-12 NOTE — TELEPHONE ENCOUNTER
Patient is scheduled at [address] [place] [address] Gastroenterology in [place]  He will inform them of your recommendations and will have them contact the office if needed

## 2020-12-23 DIAGNOSIS — I10 ESSENTIAL HYPERTENSION: ICD-10-CM

## 2020-12-23 DIAGNOSIS — K21.9 GASTROESOPHAGEAL REFLUX DISEASE WITHOUT ESOPHAGITIS: ICD-10-CM

## 2020-12-23 RX ORDER — PANTOPRAZOLE SODIUM 40 MG/1
TABLET, DELAYED RELEASE ORAL
Qty: 90 TABLET | Refills: 3 | Status: SHIPPED | OUTPATIENT
Start: 2020-12-23 | End: 2021-12-02

## 2020-12-28 DIAGNOSIS — I48.0 PAF (PAROXYSMAL ATRIAL FIBRILLATION) (HCC): ICD-10-CM

## 2020-12-28 RX ORDER — SOTALOL HYDROCHLORIDE 120 MG/1
TABLET ORAL
Qty: 180 TABLET | Refills: 3 | Status: SHIPPED | OUTPATIENT
Start: 2020-12-28 | End: 2021-12-02

## 2021-01-14 ENCOUNTER — OFFICE VISIT (OUTPATIENT)
Dept: CARDIOLOGY CLINIC | Facility: CLINIC | Age: 71
End: 2021-01-14
Payer: MEDICARE

## 2021-01-14 VITALS
SYSTOLIC BLOOD PRESSURE: 138 MMHG | HEIGHT: 74 IN | TEMPERATURE: 97.5 F | DIASTOLIC BLOOD PRESSURE: 80 MMHG | BODY MASS INDEX: 33.24 KG/M2 | HEART RATE: 80 BPM | WEIGHT: 259 LBS

## 2021-01-14 DIAGNOSIS — Z95.1 S/P CABG (CORONARY ARTERY BYPASS GRAFT): ICD-10-CM

## 2021-01-14 DIAGNOSIS — I25.10 CORONARY ARTERY DISEASE INVOLVING NATIVE CORONARY ARTERY OF NATIVE HEART WITHOUT ANGINA PECTORIS: Primary | ICD-10-CM

## 2021-01-14 DIAGNOSIS — I10 ESSENTIAL HYPERTENSION: ICD-10-CM

## 2021-01-14 DIAGNOSIS — E66.9 OBESITY (BMI 30-39.9): ICD-10-CM

## 2021-01-14 DIAGNOSIS — E78.2 MIXED HYPERLIPIDEMIA: ICD-10-CM

## 2021-01-14 DIAGNOSIS — I65.21 CAROTID OCCLUSION, RIGHT: ICD-10-CM

## 2021-01-14 DIAGNOSIS — I48.0 PAF (PAROXYSMAL ATRIAL FIBRILLATION) (HCC): ICD-10-CM

## 2021-01-14 DIAGNOSIS — R06.00 DYSPNEA ON EXERTION: ICD-10-CM

## 2021-01-14 DIAGNOSIS — I73.9 PERIPHERAL VASCULAR DISEASE (HCC): ICD-10-CM

## 2021-01-14 DIAGNOSIS — I34.0 NON-RHEUMATIC MITRAL REGURGITATION: ICD-10-CM

## 2021-01-14 PROCEDURE — 99213 OFFICE O/P EST LOW 20 MIN: CPT | Performed by: INTERNAL MEDICINE

## 2021-01-14 RX ORDER — CLOPIDOGREL BISULFATE 75 MG/1
75 TABLET ORAL DAILY
Qty: 90 TABLET | Refills: 3 | Status: SHIPPED | OUTPATIENT
Start: 2021-01-14 | End: 2022-07-05 | Stop reason: SDUPTHER

## 2021-01-14 NOTE — PATIENT INSTRUCTIONS
Continue current medications  Up to date with cardiac testing  Will obtain carotid ultrasound results from [facility]

## 2021-02-15 DIAGNOSIS — E78.2 MIXED HYPERLIPIDEMIA: Primary | ICD-10-CM

## 2021-02-15 DIAGNOSIS — I25.10 CORONARY ARTERY DISEASE INVOLVING NATIVE CORONARY ARTERY OF NATIVE HEART WITHOUT ANGINA PECTORIS: ICD-10-CM

## 2021-02-16 RX ORDER — ATORVASTATIN CALCIUM 40 MG/1
40 TABLET, FILM COATED ORAL DAILY
Qty: 90 TABLET | Refills: 3 | Status: SHIPPED | OUTPATIENT
Start: 2021-02-16 | End: 2021-02-17 | Stop reason: SDUPTHER

## 2021-02-17 DIAGNOSIS — I25.10 CORONARY ARTERY DISEASE INVOLVING NATIVE CORONARY ARTERY OF NATIVE HEART WITHOUT ANGINA PECTORIS: ICD-10-CM

## 2021-02-17 DIAGNOSIS — E78.2 MIXED HYPERLIPIDEMIA: ICD-10-CM

## 2021-02-17 RX ORDER — ATORVASTATIN CALCIUM 40 MG/1
40 TABLET, FILM COATED ORAL DAILY
Qty: 90 TABLET | Refills: 3 | Status: SHIPPED | OUTPATIENT
Start: 2021-02-17 | End: 2022-02-09 | Stop reason: SDUPTHER

## 2021-02-17 RX ORDER — ATORVASTATIN CALCIUM 40 MG/1
40 TABLET, FILM COATED ORAL DAILY
Qty: 90 TABLET | Refills: 3 | Status: SHIPPED | OUTPATIENT
Start: 2021-02-17 | End: 2021-02-17 | Stop reason: CLARIF

## 2021-03-20 ENCOUNTER — HOSPITAL ENCOUNTER (EMERGENCY)
Facility: HOSPITAL | Age: 71
Discharge: HOME | End: 2021-03-20
Attending: EMERGENCY MEDICINE | Admitting: EMERGENCY MEDICINE
Payer: MEDICARE

## 2021-03-20 ENCOUNTER — APPOINTMENT (EMERGENCY)
Dept: RADIOLOGY | Facility: HOSPITAL | Age: 71
End: 2021-03-20
Payer: MEDICARE

## 2021-03-20 VITALS
RESPIRATION RATE: 16 BRPM | HEART RATE: 81 BPM | OXYGEN SATURATION: 96 % | SYSTOLIC BLOOD PRESSURE: 147 MMHG | TEMPERATURE: 98 F | DIASTOLIC BLOOD PRESSURE: 70 MMHG

## 2021-03-20 DIAGNOSIS — S76.301A RIGHT HAMSTRING INJURY, INITIAL ENCOUNTER: Primary | ICD-10-CM

## 2021-03-20 PROCEDURE — 99283 EMERGENCY DEPT VISIT LOW MDM: CPT

## 2021-03-20 PROCEDURE — 73560 X-RAY EXAM OF KNEE 1 OR 2: CPT

## 2021-03-20 PROCEDURE — 99284 EMERGENCY DEPT VISIT MOD MDM: CPT | Performed by: PHYSICIAN ASSISTANT

## 2021-03-20 PROCEDURE — 73502 X-RAY EXAM HIP UNI 2-3 VIEWS: CPT

## 2021-03-20 RX ORDER — ACETAMINOPHEN 325 MG/1
650 TABLET ORAL ONCE
Status: COMPLETED | OUTPATIENT
Start: 2021-03-20 | End: 2021-03-20

## 2021-03-20 RX ORDER — LIDOCAINE 50 MG/G
1 PATCH TOPICAL DAILY
Qty: 5 PATCH | Refills: 0 | Status: SHIPPED | OUTPATIENT
Start: 2021-03-20 | End: 2022-02-09

## 2021-03-20 RX ORDER — LIDOCAINE 50 MG/G
1 PATCH TOPICAL ONCE
Status: DISCONTINUED | OUTPATIENT
Start: 2021-03-20 | End: 2021-03-20 | Stop reason: HOSPADM

## 2021-03-20 RX ORDER — CYCLOBENZAPRINE HCL 10 MG
10 TABLET ORAL ONCE
Status: COMPLETED | OUTPATIENT
Start: 2021-03-20 | End: 2021-03-20

## 2021-03-20 RX ORDER — CYCLOBENZAPRINE HCL 10 MG
10 TABLET ORAL 3 TIMES DAILY PRN
Qty: 12 TABLET | Refills: 0 | Status: SHIPPED | OUTPATIENT
Start: 2021-03-20 | End: 2022-02-15 | Stop reason: ALTCHOICE

## 2021-03-20 RX ADMIN — LIDOCAINE 1 PATCH: 50 PATCH TOPICAL at 10:47

## 2021-03-20 RX ADMIN — ACETAMINOPHEN 650 MG: 325 TABLET ORAL at 10:48

## 2021-03-20 RX ADMIN — CYCLOBENZAPRINE HYDROCHLORIDE 10 MG: 10 TABLET, FILM COATED ORAL at 10:48

## 2021-03-20 NOTE — DISCHARGE INSTRUCTIONS
Follow-up with orthopedics if your symptoms do not resolve within 1 week or improved  You can take Tylenol or Tylenol arthritis in addition to the medications prescribed

## 2021-03-20 NOTE — ED ATTENDING ATTESTATION
3/20/2021  I, Kelsey Oliva MD, saw and evaluated the patient  I have discussed the patient with the resident/non-physician practitioner and agree with the resident's/non-physician practitioner's findings, Plan of Care, and MDM as documented in the resident's/non-physician practitioner's note, except where noted  All available labs and Radiology studies were reviewed  I was present for key portions of any procedure(s) performed by the resident/non-physician practitioner and I was immediately available to provide assistance  At this point I agree with the current assessment done in the Emergency Department  I have conducted an independent evaluation of this patient a history and physical is as follows:    ED Course     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

## 2021-03-20 NOTE — ED PROVIDER NOTES
History  Chief Complaint   Patient presents with    Leg Pain     right leg and groin pain, was carrying an air conditioner down steps yesterday around 1100 and he got a "shot" in his leg and had to drop the airconditioner  since then has been having this problem  it did not fall on him  Patient is a 49-year-old male who presents emergency department today with a complaint of right thigh pain since yesterday  Patient states that he was helping to carry an air conditioner down some stairs at his home  Patient states that he felt a sharp pain in his right thigh  Patient denies any falls  Patient states that he had minimal pain yesterday and his right posterior thigh however this increased overnight  Patient states that he is having intermittent pain into his right groin was certain movements of his right leg  Patient denies any back pain, fevers or chills, bowel or bladder incontinence, numbness or tingling  Patient denies any knee pain or ankle pain  Patient denies any swelling or bruising  Leg Pain  Location:  Leg  Time since incident:  2 days  Injury: yes    Mechanism of injury comment:  Carrying air conditioner , felt a sharp pain in his thigh   Leg location:  R upper leg  Pain details:     Quality:  Pressure    Radiates to:  Groin    Severity:  Moderate    Onset quality:  Gradual    Timing:  Constant    Progression:  Worsening  Chronicity:  New  Dislocation: no    Foreign body present:  No foreign bodies  Tetanus status:  Unknown  Prior injury to area:  No  Relieved by:  Rest  Worsened by: Activity  Ineffective treatments:  Heat and ice  Associated symptoms: no back pain, no decreased ROM, no fatigue, no fever, no itching, no muscle weakness, no numbness, no stiffness, no swelling and no tingling        Prior to Admission Medications   Prescriptions Last Dose Informant Patient Reported? Taking?    Cholecalciferol (VITAMIN D3 HIGH POTENCY PO)   Yes No   Sig: Take by mouth   ELIQUIS 5 MG   No No Sig: TAKE 1 TABLET BY MOUTH EVERY 12 HOURS  (DISCONTINUE WARFARIN)   acetaminophen (TYLENOL) 500 mg tablet   Yes No   Sig: Take by mouth   allopurinol (ZYLOPRIM) 300 mg tablet   Yes No   Sig: Take 300 mg by mouth daily    atorvastatin (LIPITOR) 40 mg tablet   No No   Sig: Take 1 tablet (40 mg total) by mouth daily   clopidogrel (Plavix) 75 mg tablet   No No   Sig: Take 1 tablet (75 mg total) by mouth daily   finasteride (PROSCAR) 5 mg tablet   Yes No   Sig: Take by mouth   nitroglycerin (NITROSTAT) 0 4 mg SL tablet   Yes No   pantoprazole (PROTONIX) 40 mg tablet   No No   Sig: TAKE 1 TABLET EVERY DAY   potassium chloride (MICRO-K) 10 MEQ CR capsule   No No   Sig: Take 1 capsule (10 mEq total) by mouth daily   sotalol (BETAPACE) 120 mg tablet   No No   Sig: TAKE 1 TABLET TWICE DAILY  tamsulosin (FLOMAX) 0 4 mg   Yes No   Sig: Take by mouth      Facility-Administered Medications: None       Past Medical History:   Diagnosis Date    Atrial fibrillation ([facility] [address] )     Carotid occlusion, right     Coronary artery disease     GERD (gastroesophageal reflux disease)     Hyperlipidemia     Hypertension     Myocardial infarct [facility])        Past Surgical History:   Procedure Laterality Date    CARDIAC CATHETERIZATION  10/01/2008    RCA with 100% subacute occlusion  DEIRDRE x2 to RCA  Ostial left main disease with FFR 0 78      CARDIAC CATHETERIZATION  03/16/2012    Moderate 50% ostial left main  FFR 0 82  Distal RCA 40%  No intervention   CARDIAC CATHETERIZATION  11/24/2014    Ostial left main 60%  Ostial left circumflex 70%   CARDIAC CATHETERIZATION  06/29/2016    Left main 50%  FFR was less than 0 75  CABG recommended   CARDIOVERSION  07/01/2017    CEREBRAL ANGIOGRAM  12/12/2013    Carotid angiography  Left internal carotid artery 40%  Right internal carotid artery 100% occlusion   CHOLECYSTECTOMY      CORONARY ARTERY BYPASS GRAFT  07/29/2016    LIMA-LAD  FABIOLAG-OM      [address] SURGERY      TONSILLECTOMY         Family History   Problem Relation Age of Onset    Heart disease Mother     Heart failure Father      I have reviewed and agree with the history as documented  E-Cigarette/Vaping    E-Cigarette Use Never User      E-Cigarette/Vaping Substances     Social History     Tobacco Use    Smoking status: Former Smoker    Smokeless tobacco: Never Used   Substance Use Topics    Alcohol use: Yes     Alcohol/week: 1 0 standard drinks     Types: 1 Cans of beer per week     Frequency: Monthly or less     Drinks per session: 1 or 2     Binge frequency: Less than monthly    Drug use: Never       Review of Systems   Constitutional: Negative for chills, fatigue and fever  HENT: Negative for ear pain and sore throat  Eyes: Negative for pain and visual disturbance  Respiratory: Negative for cough and shortness of breath  Cardiovascular: Negative for chest pain and palpitations  Gastrointestinal: Negative for abdominal pain and vomiting  Genitourinary: Negative for dysuria and hematuria  Musculoskeletal: Negative for arthralgias, back pain and stiffness  Skin: Negative for color change, itching and rash  Neurological: Negative for seizures and syncope  All other systems reviewed and are negative  Physical Exam  Physical Exam  Vitals signs and nursing note reviewed  Constitutional:       Appearance: He is well-developed  HENT:      Head: Normocephalic and atraumatic  Eyes:      Conjunctiva/sclera: Conjunctivae normal    Neck:      Musculoskeletal: Neck supple  Cardiovascular:      Rate and Rhythm: Normal rate and regular rhythm  Heart sounds: No murmur  Pulmonary:      Effort: Pulmonary effort is normal  No respiratory distress  Breath sounds: Normal breath sounds  Abdominal:      Palpations: Abdomen is soft  Tenderness: There is no abdominal tenderness  Hernia: No hernia is present     Musculoskeletal:      Right hip: He exhibits normal range of motion and no bony tenderness  Right knee: Normal       Right upper leg: He exhibits tenderness  He exhibits no swelling, no edema and no deformity  Comments: Patient having pain with straight leg raise of right leg  Able to flex and extend right knee without difficulty  Skin:     General: Skin is warm and dry  Capillary Refill: Capillary refill takes less than 2 seconds  Neurological:      General: No focal deficit present  Mental Status: He is alert and oriented to person, place, and time  Vital Signs  ED Triage Vitals   Temperature Pulse Respirations Blood Pressure SpO2   03/20/21 0946 03/20/21 0946 03/20/21 0946 03/20/21 0953 03/20/21 0946   98 2 °F (36 8 °C) 89 18 143/76 99 %      Temp Source Heart Rate Source Patient Position - Orthostatic VS BP Location FiO2 (%)   03/20/21 0946 03/20/21 0946 -- -- --   Temporal Monitor         Pain Score       03/20/21 1031       4           Vitals:    03/20/21 0946 03/20/21 0953 03/20/21 1032   BP:  143/76 147/70   Pulse: 89  81         Visual Acuity      ED Medications  Medications   lidocaine (LIDODERM) 5 % patch 1 patch (has no administration in time range)   cyclobenzaprine (FLEXERIL) tablet 10 mg (has no administration in time range)   acetaminophen (TYLENOL) tablet 650 mg (has no administration in time range)       Diagnostic Studies  Results Reviewed     None                 XR hip/pelv 2-3 vws right if performed   ED Interpretation by Loren Henriquez PA-C (03/20 1039)   No acute injuries or dislocation      XR knee 1 or 2 views right   ED Interpretation by Loren Henriquez PA-C (03/20 1040)   No acute fractures or dislocations                 Procedures  Procedures         ED Course                             SBIRT 20yo+      Most Recent Value   SBIRT (25 yo +)   In order to provide better care to our patients, we are screening all of our patients for alcohol and drug use  Would it be okay to ask you these screening questions?   Yes Filed at: 03/20/2021 1038   Initial Alcohol Screen: US AUDIT-C    1  How often do you have a drink containing alcohol?  0 Filed at: 03/20/2021 0957   2  How many drinks containing alcohol do you have on a typical day you are drinking? 0 Filed at: 03/20/2021 0957   3a  Male UNDER 65: How often do you have five or more drinks on one occasion? 0 Filed at: 03/20/2021 0957   3b  FEMALE Any Age, or MALE 65+: How often do you have 4 or more drinks on one occassion? 0 Filed at: 03/20/2021 0957   Audit-C Score  0 Filed at: 03/20/2021 0818   LORENA: How many times in the past year have you    Used an illegal drug or used a prescription medication for non-medical reasons? Never Filed at: 03/20/2021 8474                    MDM  Number of Diagnoses or Management Options  Right hamstring injury, initial encounter:   Diagnosis management comments: Differential diagnosis included but was not limited to hamstring injury verses groin sprain verses hip fracture  X-rays were unremarkable for any acute dislocation or fracture  Patient was given medications including Flexeril, Lidoderm patch instructed take Tylenol home  Patient was instructed follow-up with orthopedics if symptoms do not improve  Patient was provided with orthopedic follow-up number information  Patient and spouse both expressed understanding was in agreement treatment plan         Amount and/or Complexity of Data Reviewed  Tests in the radiology section of CPT®: ordered and reviewed  Decide to obtain previous medical records or to obtain history from someone other than the patient: yes  Review and summarize past medical records: yes  Independent visualization of images, tracings, or specimens: yes    Risk of Complications, Morbidity, and/or Mortality  Presenting problems: moderate  Diagnostic procedures: moderate  Management options: moderate    Patient Progress  Patient progress: stable      Disposition  Final diagnoses:   Right hamstring injury, initial encounter     Time reflects when diagnosis was documented in both MDM as applicable and the Disposition within this note     Time User Action Codes Description Comment    3/20/2021 10:34 AM Mini Anderson Add [[id number] [id number]] Right hamstring injury, initial encounter       ED Disposition     ED Disposition Condition Date/Time Comment    Discharge Stable Sat Mar 20, 2021 10:34 AM Lionel Linton discharge to home/self care  Follow-up Information     Follow up With Specialties Details Why [address] [facility] Orth[facility] Schedule an appointment as soon as possible for a visit in 1 week  [facility] Gilles Dharmesh  [phone]            Patient's Medications   Discharge Prescriptions    CYCLOBENZAPRINE (FLEXERIL) 10 MG TABLET    Take 1 tablet (10 mg total) by mouth 3 (three) times a day as needed for muscle spasms for up to 4 days       Start Date: 3/20/2021 End Date: 3/24/2021       Order Dose: 10 mg       Quantity: 12 tablet    Refills: 0    LIDOCAINE (LIDODERM) 5 %    Apply 1 patch topically daily Remove & Discard patch within 12 hours or as directed by MD       Start Date: 3/20/2021 End Date: --       Order Dose: 1 patch       Quantity: 5 patch    Refills: 0     No discharge procedures on file      PDMP Review     None          ED Provider  Electronically Signed by           Dayami Deras PA-C  03/20/21 3662

## 2021-07-09 DIAGNOSIS — I48.0 PAF (PAROXYSMAL ATRIAL FIBRILLATION) (HCC): ICD-10-CM

## 2021-07-09 RX ORDER — APIXABAN 5 MG/1
TABLET, FILM COATED ORAL
Qty: 180 TABLET | Refills: 3 | Status: SHIPPED | OUTPATIENT
Start: 2021-07-09 | End: 2022-02-09 | Stop reason: SDUPTHER

## 2021-08-09 ENCOUNTER — OFFICE VISIT (OUTPATIENT)
Dept: CARDIOLOGY CLINIC | Facility: CLINIC | Age: 71
End: 2021-08-09
Payer: MEDICARE

## 2021-08-09 VITALS
WEIGHT: 259.2 LBS | BODY MASS INDEX: 33.26 KG/M2 | OXYGEN SATURATION: 97 % | HEIGHT: 74 IN | DIASTOLIC BLOOD PRESSURE: 90 MMHG | HEART RATE: 82 BPM | SYSTOLIC BLOOD PRESSURE: 150 MMHG

## 2021-08-09 DIAGNOSIS — I48.0 PAF (PAROXYSMAL ATRIAL FIBRILLATION) (HCC): ICD-10-CM

## 2021-08-09 DIAGNOSIS — I65.21 CAROTID OCCLUSION, RIGHT: ICD-10-CM

## 2021-08-09 DIAGNOSIS — I10 ESSENTIAL HYPERTENSION: ICD-10-CM

## 2021-08-09 DIAGNOSIS — I34.0 NON-RHEUMATIC MITRAL REGURGITATION: ICD-10-CM

## 2021-08-09 DIAGNOSIS — E78.2 MIXED HYPERLIPIDEMIA: ICD-10-CM

## 2021-08-09 DIAGNOSIS — R06.00 DYSPNEA ON EXERTION: ICD-10-CM

## 2021-08-09 DIAGNOSIS — E66.9 OBESITY (BMI 30-39.9): ICD-10-CM

## 2021-08-09 DIAGNOSIS — Z95.1 S/P CABG (CORONARY ARTERY BYPASS GRAFT): ICD-10-CM

## 2021-08-09 DIAGNOSIS — I73.9 PERIPHERAL VASCULAR DISEASE (HCC): ICD-10-CM

## 2021-08-09 DIAGNOSIS — I25.10 CORONARY ARTERY DISEASE INVOLVING NATIVE CORONARY ARTERY OF NATIVE HEART WITHOUT ANGINA PECTORIS: Primary | ICD-10-CM

## 2021-08-09 PROBLEM — R06.09 DYSPNEA ON EXERTION: Status: ACTIVE | Noted: 2021-08-09

## 2021-08-09 PROCEDURE — 99214 OFFICE O/P EST MOD 30 MIN: CPT | Performed by: INTERNAL MEDICINE

## 2021-08-09 NOTE — PATIENT INSTRUCTIONS
Blood pressure is elevated on exam   Recommend checking blood pressure 2 times daily for the next 7 days  Discuss skin lesion on chest with Dr Addi Wall  Call me with blood pressure readings  Updated stress test   Will attempt exercise and transition to medicine test if needed due to knee pain  Cholesterol 3/2021 looked perfect

## 2021-08-17 DIAGNOSIS — I10 ESSENTIAL HYPERTENSION: Primary | ICD-10-CM

## 2021-08-17 NOTE — TELEPHONE ENCOUNTER
I would recommend a trial of losartan 25 mg daily for blood pressure if he is agreeable (please pend) and would recommend blood work (BMP) in 2 weeks after starting  He should monitor blood pressures intermittently with the addition of medication  Patient should call us in 1 week with updated readings  Thank you

## 2021-08-17 NOTE — TELEPHONE ENCOUNTER
Dr Deeanna Riedel wanted him to call us with his BP readings  8/9 153/83    8/10 150/90 825 AM  154/85 620 PM  8/11 148/87 8 AM  143/89 833 PM  8/12  153/86 11:15 AM  140/83 9 PM  8/13  123/76 11:18 AM  131/72 9 PM     8/14 135/79 207 PM  162/84 816 PM    8/15 147/84 750 AM  156/95 215 PM         Please let pt know if he needs to do anything else  [phone]

## 2021-08-18 RX ORDER — LOSARTAN POTASSIUM 25 MG/1
25 TABLET ORAL DAILY
Qty: 30 TABLET | Refills: 11 | Status: SHIPPED | OUTPATIENT
Start: 2021-08-18 | End: 2021-10-04 | Stop reason: SDUPTHER

## 2021-09-02 ENCOUNTER — HOSPITAL ENCOUNTER (OUTPATIENT)
Dept: NUCLEAR MEDICINE | Facility: HOSPITAL | Age: 71
Discharge: HOME | End: 2021-09-02
Attending: INTERNAL MEDICINE
Payer: MEDICARE

## 2021-09-02 ENCOUNTER — HOSPITAL ENCOUNTER (OUTPATIENT)
Dept: NON INVASIVE DIAGNOSTICS | Facility: HOSPITAL | Age: 71
Discharge: HOME | End: 2021-09-02
Attending: INTERNAL MEDICINE
Payer: MEDICARE

## 2021-09-02 DIAGNOSIS — I25.10 CORONARY ARTERY DISEASE INVOLVING NATIVE CORONARY ARTERY OF NATIVE HEART WITHOUT ANGINA PECTORIS: ICD-10-CM

## 2021-09-02 DIAGNOSIS — E78.2 MIXED HYPERLIPIDEMIA: ICD-10-CM

## 2021-09-02 DIAGNOSIS — Z95.1 S/P CABG (CORONARY ARTERY BYPASS GRAFT): ICD-10-CM

## 2021-09-02 DIAGNOSIS — R06.00 DYSPNEA ON EXERTION: ICD-10-CM

## 2021-09-02 DIAGNOSIS — I10 ESSENTIAL HYPERTENSION: ICD-10-CM

## 2021-09-02 PROCEDURE — A9502 TC99M TETROFOSMIN: HCPCS

## 2021-09-02 PROCEDURE — 93017 CV STRESS TEST TRACING ONLY: CPT

## 2021-09-02 PROCEDURE — 78452 HT MUSCLE IMAGE SPECT MULT: CPT

## 2021-09-02 RX ORDER — DIAZEPAM 5 MG/1
5 TABLET ORAL EVERY 6 HOURS PRN
COMMUNITY

## 2021-09-02 RX ADMIN — REGADENOSON 0.4 MG: 0.08 INJECTION, SOLUTION INTRAVENOUS at 12:15

## 2021-09-03 ENCOUNTER — TELEPHONE (OUTPATIENT)
Dept: CARDIOLOGY CLINIC | Facility: CLINIC | Age: 71
End: 2021-09-03

## 2021-09-03 NOTE — TELEPHONE ENCOUNTER
----- Message from [facility], DO sent at 9/3/2021  1:59 PM EDT -----  Please call the patient let him know that his stress test did not suggest any new areas of blockage in the heart  Thank you

## 2021-09-27 LAB
ARRHY DURING EX: NORMAL
CHEST PAIN STATEMENT: NORMAL
MAX DIASTOLIC BP: 80 MMHG
MAX DIASTOLIC BP: 88 MMHG
MAX HEART RATE: 90 BPM
MAX HEART RATE: 90 BPM
MAX PREDICTED HEART RATE: 147 BPM
MAX PREDICTED HEART RATE: 147 BPM
MAX. SYSTOLIC BP: 154 MMHG
MAX. SYSTOLIC BP: 160 MMHG
PROTOCOL NAME: NORMAL
PROTOCOL NAME: NORMAL
REASON FOR TERMINATION: NORMAL
REASON FOR TERMINATION: NORMAL
TARGET HR FORMULA: NORMAL
TARGET HR FORMULA: NORMAL
TEST INDICATION: NORMAL
TEST INDICATION: NORMAL
TIME IN EXERCISE PHASE: NORMAL
TIME IN EXERCISE PHASE: NORMAL

## 2021-10-04 ENCOUNTER — TELEPHONE (OUTPATIENT)
Dept: CARDIOLOGY CLINIC | Facility: CLINIC | Age: 71
End: 2021-10-04

## 2021-10-04 DIAGNOSIS — I10 ESSENTIAL HYPERTENSION: ICD-10-CM

## 2021-10-04 RX ORDER — LOSARTAN POTASSIUM 50 MG/1
50 TABLET ORAL DAILY
Qty: 90 TABLET | Refills: 3 | Status: SHIPPED | OUTPATIENT
Start: 2021-10-04 | End: 2022-02-09

## 2021-10-19 ENCOUNTER — TELEPHONE (OUTPATIENT)
Dept: CARDIOLOGY CLINIC | Facility: CLINIC | Age: 71
End: 2021-10-19

## 2021-10-19 DIAGNOSIS — I10 PRIMARY HYPERTENSION: Primary | ICD-10-CM

## 2021-10-19 RX ORDER — LOSARTAN POTASSIUM 50 MG/1
50 TABLET ORAL DAILY
Qty: 14 TABLET | Refills: 0 | Status: SHIPPED | OUTPATIENT
Start: 2021-10-19 | End: 2022-02-09

## 2021-10-19 RX ORDER — LOSARTAN POTASSIUM 50 MG/1
50 TABLET ORAL DAILY
Qty: 14 TABLET | Refills: 0 | Status: CANCELLED | OUTPATIENT
Start: 2021-10-19

## 2021-11-22 ENCOUNTER — TELEPHONE (OUTPATIENT)
Dept: CARDIOLOGY CLINIC | Facility: CLINIC | Age: 71
End: 2021-11-22

## 2021-11-23 ENCOUNTER — TELEPHONE (OUTPATIENT)
Dept: CARDIOLOGY CLINIC | Facility: CLINIC | Age: 71
End: 2021-11-23

## 2021-12-01 DIAGNOSIS — K21.9 GASTROESOPHAGEAL REFLUX DISEASE WITHOUT ESOPHAGITIS: ICD-10-CM

## 2021-12-01 DIAGNOSIS — I48.0 PAF (PAROXYSMAL ATRIAL FIBRILLATION) (HCC): ICD-10-CM

## 2021-12-01 DIAGNOSIS — I10 ESSENTIAL HYPERTENSION: ICD-10-CM

## 2021-12-02 RX ORDER — PANTOPRAZOLE SODIUM 40 MG/1
TABLET, DELAYED RELEASE ORAL
Qty: 90 TABLET | Refills: 3 | Status: SHIPPED | OUTPATIENT
Start: 2021-12-02 | End: 2022-05-10 | Stop reason: SDUPTHER

## 2021-12-02 RX ORDER — SOTALOL HYDROCHLORIDE 120 MG/1
TABLET ORAL
Qty: 180 TABLET | Refills: 3 | Status: SHIPPED | OUTPATIENT
Start: 2021-12-02

## 2022-02-09 ENCOUNTER — OFFICE VISIT (OUTPATIENT)
Dept: CARDIOLOGY CLINIC | Facility: CLINIC | Age: 72
End: 2022-02-09
Payer: MEDICARE

## 2022-02-09 VITALS
SYSTOLIC BLOOD PRESSURE: 148 MMHG | OXYGEN SATURATION: 98 % | HEART RATE: 76 BPM | DIASTOLIC BLOOD PRESSURE: 82 MMHG | HEIGHT: 74 IN | BODY MASS INDEX: 32.85 KG/M2 | WEIGHT: 256 LBS

## 2022-02-09 DIAGNOSIS — E78.2 MIXED HYPERLIPIDEMIA: ICD-10-CM

## 2022-02-09 DIAGNOSIS — R06.00 DYSPNEA ON EXERTION: ICD-10-CM

## 2022-02-09 DIAGNOSIS — I48.0 PAF (PAROXYSMAL ATRIAL FIBRILLATION) (HCC): ICD-10-CM

## 2022-02-09 DIAGNOSIS — I65.21 CAROTID OCCLUSION, RIGHT: ICD-10-CM

## 2022-02-09 DIAGNOSIS — I25.10 CORONARY ARTERY DISEASE INVOLVING NATIVE CORONARY ARTERY OF NATIVE HEART WITHOUT ANGINA PECTORIS: Primary | ICD-10-CM

## 2022-02-09 DIAGNOSIS — E66.9 OBESITY (BMI 30-39.9): ICD-10-CM

## 2022-02-09 DIAGNOSIS — I73.9 PERIPHERAL VASCULAR DISEASE (HCC): ICD-10-CM

## 2022-02-09 DIAGNOSIS — I10 PRIMARY HYPERTENSION: ICD-10-CM

## 2022-02-09 DIAGNOSIS — Z95.1 S/P CABG (CORONARY ARTERY BYPASS GRAFT): ICD-10-CM

## 2022-02-09 DIAGNOSIS — I34.0 NON-RHEUMATIC MITRAL REGURGITATION: ICD-10-CM

## 2022-02-09 PROCEDURE — 99214 OFFICE O/P EST MOD 30 MIN: CPT | Performed by: INTERNAL MEDICINE

## 2022-02-09 RX ORDER — ATORVASTATIN CALCIUM 40 MG/1
40 TABLET, FILM COATED ORAL DAILY
Qty: 90 TABLET | Refills: 3 | Status: SHIPPED | OUTPATIENT
Start: 2022-02-09

## 2022-02-09 RX ORDER — LOSARTAN POTASSIUM 100 MG/1
100 TABLET ORAL DAILY
Qty: 90 TABLET | Refills: 3 | Status: SHIPPED | OUTPATIENT
Start: 2022-02-09

## 2022-02-09 NOTE — PROGRESS NOTES
Cardiology Office Visit    Asha Tim  [id number]  1950    [facility]  [address] [facility] [facility] [place] [address] [phone]      Dear Fernando Del Rio MD,    I had the pleasure of seeing your patient at our [facility] [address] [facility] office today 2/9/2022  As you know he is a pleasant 74y o  year old male with a medical history as described below  Reason for office visit: Follow for coronary artery disease, atrial fibrillation, hypertension, hyperlipidemia, mitral regurgitation and carotid artery disease          1  Coronary artery disease involving native coronary artery of native heart without angina pectoris  Assessment & Plan:  Coronary artery disease:  A  Non ST segment elevation myocardial infarction 10/01/2008  B   2 Taxus drug-eluting stents to the right coronary artery 10/01/2008  C  Moderate left main disease on cardiac catheterization 2008, 2012 and 2014  D  Cardiac catheterization 06/29/2016 with FFR less than 0 75 with left main 50% stenotic  E   CABG x 2  LIMA-LAD  SVG-OM 7/29/16  F   Non ischemic Lexiscan nuclear stress test 9/2/2021    Patient denies any cardiac symptoms at present  He denies chest pain or change in baseline dyspnea on exertion  Nuclear stress test 9/2/2021 showed no evidence of ischemia  He is currently on plavix and eliquis so he is not maintained on aspirin  Continue atorvastatin for goal LDL less than 70  Orders:  -     atorvastatin (LIPITOR) 40 mg tablet; Take 1 tablet (40 mg total) by mouth daily  -     Echo complete w/ contrast if indicated; Future; Expected date: 08/09/2022    2  S/P CABG (coronary artery bypass graft)  Assessment & Plan:  CABG x 2: LIMA-LAD  SVG-OM 7/29/16  [facility]        3  Primary hypertension  Assessment & Plan:  Systolic blood pressure is elevated on exam (148/82)    I have asked the patient to intermittently monitor at home and to call me if systolic blood pressures consistently above 145  Increase losartan to 100 mg daily  If blood pressure remains elevated we may need to consider additional antihypertensive  Orders:  -     losartan (COZAAR) 100 MG tablet; Take 1 tablet (100 mg total) by mouth daily    4  Mixed hyperlipidemia  Assessment & Plan:  Atorvastatin 40 mg  Lipid panel 3/2/2021: C 131  T 142  H 37  L 66  Goal LDL < 70  Currently at goal    Should have repeat lipid panel 3/2022  Orders:  -     atorvastatin (LIPITOR) 40 mg tablet; Take 1 tablet (40 mg total) by mouth daily    5  PAF (paroxysmal atrial fibrillation) ([facility] [address] )  Assessment & Plan:  Patient remains in normal sinus rhythm on Sotalol 120 mg twice daily  ECG at last office visit showed normal sinus rhythm  He is  on Eliquis 5 mg twice daily for anticoagulation  He follows with EP (Dr Leslie Freedman) at [facility] and was last seen 12/8/2021 at which time no changes are made to the patient's regimen  Orders:  -     apixaban (Eliquis) 5 mg; Take 1 tablet (5 mg total) by mouth every 12 (twelve) hours    6  Non-rheumatic mitral regurgitation  Assessment & Plan:  Echocardiogram 12/22/16 showed mild to moderate central mitral regurgitation  Repeat echocardiogram 02/17/2020 showed mild central mitral regurgitation  Will plan repeat echocardiogram      Orders:  -     Echo complete w/ contrast if indicated; Future; Expected date: 08/09/2022    7  Carotid occlusion, right  Assessment & Plan:  Carotid Doppler 06/05/2017 with known occlusion of the right internal carotid artery  50 to 69% stenosis of the left internal carotid artery  Patient is being followed by vascular surgery at [facility] (Dr Li Servin) with last office visit 11/20/19 and updated carotid Doppler done that day  Recent carotid duplex 12/10/2021 which was unchanged per report from patient    Dr Li Servin is no longer at the group and he has not seen anyone new as of now (will await input from new vascular surgeon)  Continue aggressive medical therapy  Discussed signs and symptoms of stroke with the patient previously  8  Obesity (BMI 30-39  9)  Assessment & Plan:  BMI 33  Discussed the importance of resuming exercise program and trying to maintain a healthy diet  9  Dyspnea on exertion  Assessment & Plan:  Patient reports stable dyspnea on exertion  Will continue to monitor for now  10  Peripheral vascular disease ([facility] [address] )  Assessment & Plan:  See discussion under carotid occlusion  HPI   Patient with history of coronary artery disease dating back to 10/2008 when he had DEIRDRE x 2 placed to an occluded right coronary artery  He ultimately underwent CABG x 2 with LIMA-LAD and SVG-OM 7/29/16  He had post operative atrial fibrillation and follows with Dr Mary Ulloa at [facility] for EP  He was last seen 6/2019  [facility] is on sotalol  He has known carotid occlusion for which he was following with Dr Erasmo Quiroz (vascular surgery)  2/9/2022: Patient presents to the office today for follow up  He was seen by Dr Mary Ulloa 12/20/2021 and no changes were made  No chest pain  No shortness of breath  Dyspnea on exertion with certain activities  No palpitations  No bleeding issues  He does have bruising  Rare lightheadedness (positional)  Blood pressure was high the last few days  Carotid ultrasound 12/10/21 was unchanged from prior  Patient Active Problem List   Diagnosis    Carotid occlusion, right    CAD (coronary artery disease)    Hypertension    Hyperlipidemia    Non-rheumatic mitral regurgitation    Obesity (BMI 30-39  9)    [facility]    S/P CABG (coronary artery bypass graft)    Dyspnea on exertion    Peripheral vascular disease ([facility])     Past Medical History:   Diagnosis Date    Atrial fibrillation ([facility] )     Carotid occlusion, right     Coronary artery disease     GERD (gastroesophageal reflux disease)     Hyperlipidemia  Hypertension     Myocardial infarct [facility])      Social History     Socioeconomic History    Marital status: /Civil Union     Spouse name: Not on file    Number of children: Not on file    Years of education: Not on file    Highest education level: Not on file   Occupational History    Occupation: Retired   Tobacco Use    Smoking status: Former Smoker    Smokeless tobacco: Never Used   Vaping Use    Vaping Use: Never used   Substance and Sexual Activity    Alcohol use: Yes     Alcohol/week: 1 0 standard drink     Types: 1 Cans of beer per week    Drug use: Never    Sexual activity: Not on file     Comment: Defer   Other Topics Concern    Not on file   Social History Narrative    Not on file     Social Determinants of Health     Financial Resource Strain: Not on file   Food Insecurity: Not on file   Transportation Needs: Not on file   Physical Activity: Not on file   Stress: Not on file   Social Connections: Not on file   Intimate Partner Violence: Not on file   Housing Stability: Not on file      Family History   Problem Relation Age of Onset    Heart disease Mother     Heart failure Father      Past Surgical History:   Procedure Laterality Date    CARDIAC CATHETERIZATION  10/01/2008    RCA with 100% subacute occlusion  DEIRDRE x2 to RCA  Ostial left main disease with FFR 0 78      CARDIAC CATHETERIZATION  03/16/2012    Moderate 50% ostial left main  FFR 0 82  Distal RCA 40%  No intervention   CARDIAC CATHETERIZATION  11/24/2014    Ostial left main 60%  Ostial left circumflex 70%   CARDIAC CATHETERIZATION  06/29/2016    Left main 50%  FFR was less than 0 75  CABG recommended   CARDIOVERSION  07/01/2017    CEREBRAL ANGIOGRAM  12/12/2013    Carotid angiography  Left internal carotid artery 40%  Right internal carotid artery 100% occlusion   CHOLECYSTECTOMY      CORONARY ARTERY BYPASS GRAFT  07/29/2016    LIMA-LAD  SVG-OM       LUMBAR DISC SURGERY      TONSILLECTOMY Current Outpatient Medications:     acetaminophen (TYLENOL) 500 mg tablet, Take by mouth, Disp: , Rfl:     allopurinol (ZYLOPRIM) 300 mg tablet, Take 300 mg by mouth daily , Disp: , Rfl:     apixaban (Eliquis) 5 mg, Take 1 tablet (5 mg total) by mouth every 12 (twelve) hours, Disp: 180 tablet, Rfl: 3    atorvastatin (LIPITOR) 40 mg tablet, Take 1 tablet (40 mg total) by mouth daily, Disp: 90 tablet, Rfl: 3    Cholecalciferol (VITAMIN D3 HIGH POTENCY PO), Take by mouth, Disp: , Rfl:     clopidogrel (Plavix) 75 mg tablet, Take 1 tablet (75 mg total) by mouth daily, Disp: 90 tablet, Rfl: 3    diazepam (VALIUM) 5 mg tablet, Take 5 mg by mouth every 6 (six) hours as needed for anxiety, Disp: , Rfl:     finasteride (PROSCAR) 5 mg tablet, Take by mouth, Disp: , Rfl:     losartan (COZAAR) 100 MG tablet, Take 1 tablet (100 mg total) by mouth daily, Disp: 90 tablet, Rfl: 3    nitroglycerin (NITROSTAT) 0 4 mg SL tablet, , Disp: , Rfl:     pantoprazole (PROTONIX) 40 mg tablet, TAKE 1 TABLET EVERY DAY, Disp: 90 tablet, Rfl: 3    potassium chloride (MICRO-K) 10 MEQ CR capsule, Take 1 capsule (10 mEq total) by mouth daily, Disp: 90 capsule, Rfl: 3    sotalol (BETAPACE) 120 mg tablet, TAKE 1 TABLET TWICE DAILY, Disp: 180 tablet, Rfl: 3    tamsulosin (FLOMAX) 0 4 mg, Take by mouth, Disp: , Rfl:   No Known Allergies      CardiacTest Results:     Carotid Duplex 12/10/2021: No report available (will try to obtain)  Lexiscan nuclear stress test 9/2/2021:   Fernando protocol attempted  Transitioned to [facility] as heart rate blunted  No evidence of ischemia  Fixed inferior defect in RCA territory  EF 52%  Lipid panel 3/2/2021: C 131  T 142  H 37  L 66  Carotid Duplex 11/23/2020: JAY known 100% occlusion  40-70% LICA stenosis  No change from prior study       Echocardiogram 02/17/2020:  Mildly dilated left ventricle with normal left ventricular systolic function  EF estimated at 60-65%    Mild concentric left ventricular hypertrophy  Abnormal septal motion likely due to known cardiac surgery  Mild diastolic dysfunction  Moderately dilated right ventricle with normal right ventricular systolic function  Mild left and right atrial enlargement  Aortic sclerosis without stenosis  Mitral annular calcification  Mild central mitral regurgitation  Mild tricuspid regurgitation  Estimated PASP 30 mmHg         Cv Stress Nuclear Pharm: Result Date: 5/4/2018  No evidence of ECG changes to suggest ischemia  · The patient reported shortness of breath during the stress test  · Blood pressure demonstrated a hypotensive response to stress  · There is a small to moderate sized non-reversible (scar/infarct) defect of mild severity present in the basal anterior and basal inferior wall(s), mostly normalized with CT correction, consistent with diaphragmatic attenuation  · Left ventricular perfusion is probably normal  No ischemia · This is a low risk study  Arterial duplex 11/06/2013:  No abdominal aortic aneurysm   No significant aortoiliac disease  Stress echocardiogram 06/02/2016:  Fernando   7 minutes 42 seconds   8 3 Mets   Hypotension with exercise   66% of maximum predicted heart rate   Frequent PACs and PVCs   Atrial tachycardia   No ischemia at submaximal heart rate      Carotid duplex 06/05/2017:  Occluded right internal carotid artery  50 to 69% left internal carotid artery stenosis    Twelve month follow-up recommended      Echocardiogram 12/22/2016:  Mildly dilated LV   EF 55%   Abnormal septal motion   Mild concentric left ventricular hypertrophy   Mildly enlarged RV with normal RV systolic function   Mild right atrial enlargement/left atrial enlargement   Aortic sclerosis   No AI   MAC   Mild to moderate central MR   Mild TR   Top-normal PASP 39 mmHg  Review of Systems:    Review of Systems   Constitutional: Negative for activity change, appetite change and fatigue     HENT: Negative for congestion, hearing loss, tinnitus and trouble swallowing  Eyes: Negative for visual disturbance  Respiratory: Negative for cough, chest tightness, shortness of breath and wheezing  Stable dyspnea on exertion   Cardiovascular: Negative for chest pain, palpitations and leg swelling  Gastrointestinal: Negative for abdominal distention, abdominal pain, nausea and vomiting  Genitourinary: Negative for difficulty urinating  Musculoskeletal: Negative for arthralgias  Skin: Negative for rash  Neurological: Negative for dizziness, syncope and light-headedness  Hematological: Bruises/bleeds easily  Psychiatric/Behavioral: Negative for confusion  The patient is not nervous/anxious  All other systems reviewed and are negative  Vitals:    02/09/22 0807 02/09/22 0836   BP: (!) 172/98 148/82   BP Location:  Left arm   Patient Position:  Sitting   Pulse: 76    SpO2: 98%    Weight: 116 kg (256 lb)    Height: 6' 2" (1 88 m)      Vitals:    02/09/22 0807   Weight: 116 kg (256 lb)     Height: 6' 2" (188 cm)     Physical Exam  Vitals reviewed  Constitutional:       Appearance: He is well-developed  He is obese  HENT:      Head: Normocephalic and atraumatic  Eyes:      Conjunctiva/sclera: Conjunctivae normal       Pupils: Pupils are equal, round, and reactive to light  Neck:      Vascular: No JVD  Cardiovascular:      Rate and Rhythm: Normal rate and regular rhythm  Heart sounds: Normal heart sounds  No murmur heard  No friction rub  No gallop  Pulmonary:      Effort: Pulmonary effort is normal       Breath sounds: Normal breath sounds  Abdominal:      General: Bowel sounds are normal       Palpations: Abdomen is soft  Musculoskeletal:      Cervical back: Normal range of motion  Right lower leg: Edema (Trivial) present  Left lower leg: Edema ( trivial) present  Skin:     General: Skin is warm and dry  Neurological:      Mental Status: He is alert and oriented to person, place, and time  Psychiatric:         Behavior: Behavior normal

## 2022-02-09 NOTE — PATIENT INSTRUCTIONS
Blood pressure higher than goal   Recommend increasing losartan to 100 mg daily  Check blood pressure for the next 7 days or so and call me next week and let me know how they are  Call me with any change in symptoms

## 2022-02-15 NOTE — ASSESSMENT & PLAN NOTE
Coronary artery disease:  A  Non ST segment elevation myocardial infarction 10/01/2008  B   2 Taxus drug-eluting stents to the right coronary artery 10/01/2008  C  Moderate left main disease on cardiac catheterization 2008, 2012 and 2014  D  Cardiac catheterization 06/29/2016 with FFR less than 0 75 with left main 50% stenotic  E   CABG x 2  LIMA-LAD  SVG-OM 7/29/16  F   Non ischemic Lexiscan nuclear stress test 9/2/2021    Patient denies any cardiac symptoms at present  He denies chest pain or change in baseline dyspnea on exertion  Nuclear stress test 9/2/2021 showed no evidence of ischemia  He is currently on plavix and eliquis so he is not maintained on aspirin  Continue atorvastatin for goal LDL less than 70

## 2022-02-15 NOTE — ASSESSMENT & PLAN NOTE
Carotid Doppler 06/05/2017 with known occlusion of the right internal carotid artery  50 to 69% stenosis of the left internal carotid artery  Patient is being followed by vascular surgery at [facility] (Dr Irving Coulter) with last office visit 11/20/19 and updated carotid Doppler done that day  Recent carotid duplex 12/10/2021 which was unchanged per report from patient  Dr Irving Coulter is no longer at the group and he has not seen anyone new as of now (will await input from new vascular surgeon)  Continue aggressive medical therapy  Discussed signs and symptoms of stroke with the patient previously

## 2022-02-15 NOTE — ASSESSMENT & PLAN NOTE
Echocardiogram 12/22/16 showed mild to moderate central mitral regurgitation  Repeat echocardiogram 02/17/2020 showed mild central mitral regurgitation    Will plan repeat echocardiogram

## 2022-02-15 NOTE — ASSESSMENT & PLAN NOTE
Atorvastatin 40 mg  Lipid panel 3/2/2021: C 131  T 142  H 37  L 66  Goal LDL < 70  Currently at goal    Should have repeat lipid panel 3/2022

## 2022-02-15 NOTE — ASSESSMENT & PLAN NOTE
Patient remains in normal sinus rhythm on Sotalol 120 mg twice daily  ECG at last office visit showed normal sinus rhythm  He is  on Eliquis 5 mg twice daily for anticoagulation  He follows with EP (Dr Nena Pace) at [facility] and was last seen 12/8/2021 at which time no changes are made to the patient's regimen

## 2022-02-15 NOTE — ASSESSMENT & PLAN NOTE
Systolic blood pressure is elevated on exam (148/82)  I have asked the patient to intermittently monitor at home and to call me if systolic blood pressures consistently above 145  Increase losartan to 100 mg daily  If blood pressure remains elevated we may need to consider additional antihypertensive

## 2022-02-17 ENCOUNTER — TELEPHONE (OUTPATIENT)
Dept: CARDIOLOGY CLINIC | Facility: CLINIC | Age: 72
End: 2022-02-17

## 2022-02-17 NOTE — TELEPHONE ENCOUNTER
Looks like most BP readings are within goal with a couple above 401 systolic  Please make sure with patient that we have verified accuracy of his cuff and if we have not please bring in for nurse visit to verify accuracy of cuff  Thanks!

## 2022-02-17 NOTE — TELEPHONE ENCOUNTER
Pt was told to call in with his BP readings  2/10  140/91  AM    2/11  149/89  AM    2/12   146/84  AM    2/13   155/85  AM    2/14   121/75  PM    2/15    121/71  PM    2/16   123/73  PM    2/17  147/76   AM This morning after his shower        [phone]

## 2022-02-18 ENCOUNTER — CLINICAL SUPPORT (OUTPATIENT)
Dept: CARDIOLOGY CLINIC | Facility: CLINIC | Age: 72
End: 2022-02-18
Payer: MEDICARE

## 2022-02-18 VITALS
WEIGHT: 258.3 LBS | SYSTOLIC BLOOD PRESSURE: 138 MMHG | HEART RATE: 87 BPM | HEIGHT: 74 IN | DIASTOLIC BLOOD PRESSURE: 64 MMHG | OXYGEN SATURATION: 97 % | TEMPERATURE: 96.8 F | BODY MASS INDEX: 33.15 KG/M2

## 2022-02-18 DIAGNOSIS — I10 PRIMARY HYPERTENSION: Primary | ICD-10-CM

## 2022-02-18 PROCEDURE — 99211 OFF/OP EST MAY X REQ PHY/QHP: CPT

## 2022-04-29 PROCEDURE — 93000 ELECTROCARDIOGRAM COMPLETE: CPT | Performed by: INTERNAL MEDICINE

## 2022-04-29 NOTE — ASSESSMENT & PLAN NOTE
Coronary artery disease:  A  Non ST segment elevation myocardial infarction 10/01/2008  B   2 Taxus drug-eluting stents to the right coronary artery 10/01/2008  C  Moderate left main disease on cardiac catheterization 2008, 2012 and 2014  D  Cardiac catheterization 06/29/2016 with FFR less than 0 75 with left main 50% stenotic  E   CABG x 2  LIMA-LAD  SVG-OM 7/29/16  Patient denies any cardiac symptoms at present  He denies chest pain or change in baseline dyspnea on exertion  Nuclear stress test 5/4/18 showed no evidence of ischemia  He is currently on plavix and eliquis so he is not maintained on aspirin  Continue atorvastatin for goal LDL less than 70  Will plan surveillance stress test later 2021 unless symptoms warrant earlier testing

## 2022-04-29 NOTE — ASSESSMENT & PLAN NOTE
Systolic blood pressure is minimally elevated on exam   I have asked the patient to intermittently monitor at home and to call me if systolic blood pressures consistently above 145  If blood pressure remains elevated we may need to consider additional antihypertensive

## 2022-04-29 NOTE — ASSESSMENT & PLAN NOTE
Carotid Doppler 06/05/2017 with known occlusion of the right internal carotid artery  50 to 69% stenosis of the left internal carotid artery  Patient is being followed by vascular surgery at [facility] (Dr Francisco Shore)  Carotid ultrasound 12/2/2020  Repeat carotid in 1 year if stable 2 year follow up with vascular per their recommendations  Continue aggressive medical therapy  Discussed signs and symptoms of stroke with the patient previously

## 2022-04-29 NOTE — ASSESSMENT & PLAN NOTE
Carotid Doppler 06/05/2017 with known occlusion of the right internal carotid artery  50 to 69% stenosis of the left internal carotid artery  Patient is being followed by vascular surgery at [facility]   Reportedly unchanged carotid 12/2/2020  Repeat carotid in 1 year if stable 2 year follow up with vascular per their recommendations  Continue aggressive medical therapy  Discussed signs and symptoms of stroke with the patient previously

## 2022-04-29 NOTE — ASSESSMENT & PLAN NOTE
Body mass index is 33 25 kg/m²  Discussed the importance of resuming exercise program and trying to maintain a healthy diet

## 2022-04-29 NOTE — ASSESSMENT & PLAN NOTE
He remains in normal sinus rhythm on Sotalol 120 mg twice daily  ECG at last office visit showed normal sinus rhythm  He is  on Eliquis anticoagulation  He follows with EP (Dr Ginny Rausch) at [facility] and was last seen 11/24/2020 at which time no changes are made to the patient's regimen (they did discuss Watchman)

## 2022-04-29 NOTE — ASSESSMENT & PLAN NOTE
Echocardiogram 12/22/16 showed mild to moderate central mitral regurgitation  Repeat echocardiogram 02/17/2020 showed mild central mitral regurgitation  Will plan repeat echo in approximately 1-2 years given discrepancy of regurgitation

## 2022-04-30 NOTE — ASSESSMENT & PLAN NOTE
Patient reports increased dyspnea on exertion since his last office visit  Will plan updated stress test as outlined above  Will need updated echocardiogram at follow up as well     Patient was instructed to call us if he notices any change/worsening  in symptoms

## 2022-04-30 NOTE — ASSESSMENT & PLAN NOTE
Blood pressure is elevated on exam   I have asked the patient to monitor blood pressure 2 x a day at home and to call me if systolic blood pressures consistently above 145  If blood pressure remains elevated we may need to consider additional antihypertensive  Continue current medications without change for now

## 2022-04-30 NOTE — PROGRESS NOTES
Cardiology Office Visit    Romana Graver  [id number]  1950    [facility]  [address] [place] [id number] [phone]      Dear Beau Nielson MD,    I had the pleasure of seeing your patient at our [address] [facility] [place] office today 8/9/2021  As you know he is a pleasant  male with a medical history as described below  Reason for office visit: Follow for coronary artery disease, atrial fibrillation, hypertension, hyperlipidemia, mitral regurgitation and carotid artery disease  1  Coronary artery disease involving native coronary artery of native heart without angina pectoris  Assessment & Plan:  Coronary artery disease:  A  Non ST segment elevation myocardial infarction 10/01/2008  B   2 Taxus drug-eluting stents to the right coronary artery 10/01/2008  C  Moderate left main disease on cardiac catheterization 2008, 2012 and 2014  D  Cardiac catheterization 06/29/2016 with FFR less than 0 75 with left main 50% stenotic  E   CABG x 2  LIMA-LAD  SVG-OM 7/29/16  Patient reports increase in dyspnea on exertion since his last office visit  He denies chest pain  Nuclear stress test 5/4/18 showed no evidence of ischemia  He is currently on plavix and eliquis so he is not maintained on aspirin  Continue atorvastatin for goal LDL less than 70  I have recommended updated stress testing given change in his symptoms  Recommend updated exercise nuclear stress test understanding that if his knee pain limits him, this may need to be changed to [place]  May need to consider repeat echocardiogram as well         2  S/P CABG (coronary artery bypass graft)  Assessment & Plan:  CABG x 2: LIMA-LAD  SVG-OM 7/29/16   [address]  hypertension  Assessment & Plan:  Blood pressure is elevated on exam   I have asked the patient to monitor blood pressure 2 x a day at home and to call me if systolic blood pressures consistently above 145  If blood pressure remains elevated we may need to consider additional antihypertensive  Continue current medications without change for now  4  Mixed hyperlipidemia  Assessment & Plan:  Lipid panel 3/2/2021: C 131  T 142  H 37  L 66  Goal LDL < 70  Continue atorvastatin 40 mg daily  5  PAF (paroxysmal atrial fibrillation) ([address] )  Assessment & Plan:  He remains in normal sinus rhythm on Sotalol 120 mg twice daily  He is  on Eliquis anticoagulation  He follows with EP (Dr Silvia Bundy) at [facility] and was last seen 5/17/2021 at which time no changes are made to the patient's regimen (they did discuss Watchman again)  Continue sotalol at current dose  6  Non-rheumatic mitral regurgitation  Assessment & Plan:  Echocardiogram 12/22/16 showed mild to moderate central mitral regurgitation  Repeat echocardiogram 02/17/2020 showed mild central mitral regurgitation  Will plan repeat echocardiogram now  7  Carotid occlusion, right  Assessment & Plan:  Carotid Doppler 06/05/2017 with known occlusion of the right internal carotid artery  50 to 69% stenosis of the left internal carotid artery  Patient is being followed by vascular surgery at [facility]   Reportedly unchanged carotid 12/2/2020  Carotid 11/23/2020 showed JAY occlusion and left carotid stenosis 20-49%  Continue aggressive medical therapy  Discussed signs and symptoms of stroke with the patient previously  He continues to follow with vascular surgery at [facility]  8  Obesity (BMI 30-39  9)  Assessment & Plan: Body mass index is 33 28 kg/m²  Discussed the importance of resuming exercise program and trying to maintain a healthy diet in attempts at weight loss  9  Dyspnea on exertion  Assessment & Plan:  Patient reports increased dyspnea on exertion since his last office visit  Will plan updated stress test as outlined above     Will need updated echocardiogram at follow up as well  Patient was instructed to call us if he notices any change/worsening  in symptoms      10  Peripheral vascular disease [facility])  Assessment & Plan:  Carotid Doppler 06/05/2017 with known occlusion of the right internal carotid artery  50 to 69% stenosis of the left internal carotid artery  Patient is being followed by vascular surgery at [facility] (Dr Bharath Morley)  Carotid ultrasound 11/23/2020  Repeat carotid in 1 year if stable 2 year follow up with vascular per their recommendations  Continue aggressive medical therapy  Discussed signs and symptoms of stroke with the patient previously  HPI     Patient with history of coronary artery disease dating back to 10/2008 when he had DEIRDRE x 2 placed to an occluded right coronary artery  He ultimately underwent CABG x 2 with LIMA-LAD and SVG-OM 7/29/16  He had post operative atrial fibrillation and follows with Dr Iban Cedillo at [facility] for EP  He was last seen 6/2019  He is on sotalol  He has known carotid occlusion for which he follows with Dr Bharath Morley (vascular surgery)  He was last seen 11/20/19  I had last seen the patient 1/16/2020 at which time he was doing well  He denied any chest pain  He did note stable dyspnea on exertion  No lightheadedness or dizziness  He had transitioned to Eliquis from Coumadin/warfarin  I had recommended updated echocardiogram given history of moderate mitral regurgitation  Echocardiogram 2/17/2020 showed mild central mitral regurgitation  EF 60-65%  7/14/2020:  Patient returns to the office today for follow-up  He denies any chest pain  He denies any palpitations  He denies any lightheadedness and dizziness  Patient was seen by Guanaco Medrano 5/22/2020  No changes were made to his regimen  ECG at that time showed normal sinus rhythm per report  Patient is having a lot of knee pain    He reports that this has decreased his activity level     1/14/2021: Patient presents to the office today for follow up  He denies any chest pain  No shortness of breath  No LH/D  He has seen by urology and EP since I last saw him  He saw Dr Silvia Bundy 11/24/2020 (EP at [address]) who made no changes to his medications  They did discuss Watchman procedure briefly  He was seen by urology 11/27/2020 and things were stable  Carotid 12/2/2020 not available for review  8/9/2021: Patient returns to the office today for follow up  He denies any chest pain  He denies shortness of breath  He does note dyspnea on exertion and says that if he is doing too much that he has to take 'breaks'  Carotid 11/23/2020 showed occluded right carotid and a stable 20-49% stenosis on the left  He was seen by Dr Silvia Bundy (EP) 5/17/2021 and no changes were made  Lipid panel reviewed from 3/2/2021 showed an LDL of 66  He does have sometimes limiting left knee pain  He denies any palpitations  He does have a skin lesion that he asked me to look at which I think should also be assessed by PCP  Patient Active Problem List   Diagnosis    Carotid occlusion, right    CAD (coronary artery disease)    Hypertension    Hyperlipidemia    Non-rheumatic mitral regurgitation    Obesity (BMI 30-39  9)    PAF (paroxysmal atrial fibrillation) ([facility])    S/P CABG (coronary artery bypass graft)    Dyspnea on exertion    Peripheral vascular disease (HCC)     Past Medical History:   Diagnosis Date    Atrial fibrillation (HCC)     Carotid occlusion, right     Coronary artery disease     GERD (gastroesophageal reflux disease)     Hyperlipidemia     Hypertension     Myocardial infarct [facility])      Social History     Socioeconomic History    Marital status: /Civil Union     Spouse name: Not on file    Number of children: Not on file    Years of education: Not on file    Highest education level: Not on file   Occupational History    Occupation: Retired   Tobacco Use    Smoking status: Former Smoker    Smokeless tobacco: Never Used   Vaping Use    Vaping Use: Never used   Substance and Sexual Activity    Alcohol use: Yes     Alcohol/week: 1 0 standard drink     Types: 1 Cans of beer per week    Drug use: Never    Sexual activity: Not on file     Comment: Defer   Other Topics Concern    Not on file   Social History Narrative    Not on file     Social Determinants of Health     Financial Resource Strain: Not on file   Food Insecurity: Not on file   Transportation Needs: Not on file   Physical Activity: Not on file   Stress: Not on file   Social Connections: Not on file   Intimate Partner Violence: Not on file   Housing Stability: Not on file      Family History   Problem Relation Age of Onset    Heart disease Mother     Heart failure Father      Past Surgical History:   Procedure Laterality Date    CARDIAC CATHETERIZATION  10/01/2008    RCA with 100% subacute occlusion  DEIRDRE x2 to RCA  Ostial left main disease with FFR 0 78      CARDIAC CATHETERIZATION  03/16/2012    Moderate 50% ostial left main  FFR 0 82  Distal RCA 40%  No intervention   CARDIAC CATHETERIZATION  11/24/2014    Ostial left main 60%  Ostial left circumflex 70%   CARDIAC CATHETERIZATION  06/29/2016    Left main 50%  FFR was less than 0 75  CABG recommended   CARDIOVERSION  07/01/2017    CEREBRAL ANGIOGRAM  12/12/2013    Carotid angiography  Left internal carotid artery 40%  Right internal carotid artery 100% occlusion   CHOLECYSTECTOMY      CORONARY ARTERY BYPASS GRAFT  07/29/2016    LIMA-LAD  SVG-OM   LUMBAR DISC SURGERY      TONSILLECTOMY         * Current medications reviewed  No Known Allergies    Cardiac Test Results:     Lipid panel 3/2/2021: C 131  T 142  H 37  L 66  ECG 1/14/2021: Normal sinus rhythm  Normal ECG  Carotid Doppler 11/23/2020:   JAY occluded  LICA 41-62% stenosis  No change from prior  Lipid panel 3/2/2020: C 115  T 122  H 33  L 58       Echocardiogram 02/17/2020:  Mildly dilated left ventricle with normal left ventricular systolic function  EF estimated at 60-65%  Mild concentric left ventricular hypertrophy  Abnormal septal motion likely due to known cardiac surgery  Mild diastolic dysfunction  Moderately dilated right ventricle with normal right ventricular systolic function  Mild left and right atrial enlargement  Aortic sclerosis without stenosis  Mitral annular calcification  Mild central mitral regurgitation  Mild tricuspid regurgitation  Estimated PASP 30 mmHg         Cv Stress Nuclear Pharm: Result Date: 5/4/2018  No evidence of ECG changes to suggest ischemia  · The patient reported shortness of breath during the stress test  · Blood pressure demonstrated a hypotensive response to stress  · There is a small to moderate sized non-reversible (scar/infarct) defect of mild severity present in the basal anterior and basal inferior wall(s), mostly normalized with CT correction, consistent with diaphragmatic attenuation  · Left ventricular perfusion is probably normal  No ischemia · This is a low risk study  Arterial duplex 11/06/2013:  No abdominal aortic aneurysm   No significant aortoiliac disease  Stress echocardiogram 06/02/2016:  [place]   7 minutes 42 seconds   8 3 Mets   Hypotension with exercise   66% of maximum predicted heart rate   Frequent PACs and PVCs   Atrial tachycardia   No ischemia at submaximal heart rate      Carotid duplex 06/05/2017:  Occluded right internal carotid artery  50 to 69% left internal carotid artery stenosis    Twelve month follow-up recommended      Echocardiogram 12/22/2016:  Mildly dilated LV   EF 55%   Abnormal septal motion   Mild concentric left ventricular hypertrophy   Mildly enlarged RV with normal RV systolic function   Mild right atrial enlargement/left atrial enlargement   Aortic sclerosis   No AI   MAC   Mild to moderate central MR   Mild TR   Top-normal PASP 39 mmHg        Review of Systems:    Review of Systems   Constitutional: Negative for activity change, appetite change and fatigue  HENT: Negative for congestion, hearing loss, tinnitus and trouble swallowing  Eyes: Negative for visual disturbance  Respiratory: Negative for cough, chest tightness, shortness of breath and wheezing  Dyspnea on exertion   Cardiovascular: Negative for chest pain, palpitations and leg swelling  Gastrointestinal: Negative for abdominal distention, abdominal pain, nausea and vomiting  Genitourinary: Negative for difficulty urinating  Musculoskeletal: Positive for arthralgias  Skin: Negative for rash  Neurological: Negative for dizziness, syncope and light-headedness  Hematological: Does not bruise/bleed easily  Psychiatric/Behavioral: Negative for confusion  The patient is not nervous/anxious  All other systems reviewed and are negative  Vitals:    08/09/21 0806 08/09/21 0838   BP: 164/90 150/90   BP Location:  Left arm   Patient Position:  Sitting   Pulse: 82    SpO2: 97%    Weight: 118 kg (259 lb 3 2 oz)    Height: 6' 2" (1 88 m)      Vitals:    08/09/21 0806   Weight: 118 kg (259 lb 3 2 oz)     Height: 6' 2" (188 cm)   Body mass index is 33 28 kg/m²  Physical Exam  Constitutional:       Appearance: He is well-developed  HENT:      Head: Normocephalic and atraumatic  Eyes:      Conjunctiva/sclera: Conjunctivae normal       Pupils: Pupils are equal, round, and reactive to light  Neck:      Vascular: No JVD  Cardiovascular:      Rate and Rhythm: Normal rate and regular rhythm  Occasional extrasystoles are present  Heart sounds: Normal heart sounds  No murmur heard  No friction rub  No gallop  Pulmonary:      Effort: Pulmonary effort is normal       Breath sounds: Normal breath sounds  Abdominal:      General: Bowel sounds are normal       Palpations: Abdomen is soft  Musculoskeletal:      Cervical back: Normal range of motion     Skin:     General: Skin is warm and dry  Neurological:      Mental Status: He is alert and oriented to person, place, and time     Psychiatric:         Behavior: Behavior normal

## 2022-04-30 NOTE — ASSESSMENT & PLAN NOTE
Body mass index is 33 28 kg/m²  Discussed the importance of resuming exercise program and trying to maintain a healthy diet in attempts at weight loss

## 2022-04-30 NOTE — ASSESSMENT & PLAN NOTE
Echocardiogram 12/22/16 showed mild to moderate central mitral regurgitation  Repeat echocardiogram 02/17/2020 showed mild central mitral regurgitation  Will plan repeat echocardiogram now

## 2022-04-30 NOTE — ASSESSMENT & PLAN NOTE
Carotid Doppler 06/05/2017 with known occlusion of the right internal carotid artery  50 to 69% stenosis of the left internal carotid artery  Patient is being followed by vascular surgery at [facility] (Dr Maninder Aguilar)  Carotid ultrasound 11/23/2020  Repeat carotid in 1 year if stable 2 year follow up with vascular per their recommendations  Continue aggressive medical therapy  Discussed signs and symptoms of stroke with the patient previously

## 2022-04-30 NOTE — ASSESSMENT & PLAN NOTE
He remains in normal sinus rhythm on Sotalol 120 mg twice daily  He is  on Eliquis anticoagulation  He follows with EP (Dr Ryanne Andrews) at [facility] and was last seen 5/17/2021 at which time no changes are made to the patient's regimen (they did discuss Watchman again)  Continue sotalol at current dose

## 2022-04-30 NOTE — ASSESSMENT & PLAN NOTE
Coronary artery disease:  A  Non ST segment elevation myocardial infarction 10/01/2008  B   2 Taxus drug-eluting stents to the right coronary artery 10/01/2008  C  Moderate left main disease on cardiac catheterization 2008, 2012 and 2014  D  Cardiac catheterization 06/29/2016 with FFR less than 0 75 with left main 50% stenotic  E   CABG x 2  LIMA-LAD  SVG-OM 7/29/16  Patient reports increase in dyspnea on exertion since his last office visit  He denies chest pain  Nuclear stress test 5/4/18 showed no evidence of ischemia  He is currently on plavix and eliquis so he is not maintained on aspirin  Continue atorvastatin for goal LDL less than 70  I have recommended updated stress testing given change in his symptoms  Recommend updated exercise nuclear stress test understanding that if his knee pain limits him, this may need to be changed to [employer]  May need to consider repeat echocardiogram as well

## 2022-04-30 NOTE — ASSESSMENT & PLAN NOTE
Carotid Doppler 06/05/2017 with known occlusion of the right internal carotid artery  50 to 69% stenosis of the left internal carotid artery  Patient is being followed by vascular surgery at [facility]   Reportedly unchanged carotid 12/2/2020  Carotid 11/23/2020 showed JAY occlusion and left carotid stenosis 20-49%  Continue aggressive medical therapy  Discussed signs and symptoms of stroke with the patient previously  He continues to follow with vascular surgery at [facility]

## 2022-05-09 DIAGNOSIS — I10 HYPERTENSION, UNSPECIFIED TYPE: ICD-10-CM

## 2022-05-09 DIAGNOSIS — K21.9 GASTROESOPHAGEAL REFLUX DISEASE WITHOUT ESOPHAGITIS: ICD-10-CM

## 2022-05-09 DIAGNOSIS — I10 ESSENTIAL HYPERTENSION: ICD-10-CM

## 2022-05-09 NOTE — TELEPHONE ENCOUNTER
Pt DEBBY stating that he needs refills he didn't say which ones    Call pt on the home phone first if no answer try the cell

## 2022-05-10 ENCOUNTER — HOSPITAL ENCOUNTER (OUTPATIENT)
Dept: MRI IMAGING | Facility: HOSPITAL | Age: 72
Discharge: HOME | End: 2022-05-10
Payer: MEDICARE

## 2022-05-10 DIAGNOSIS — M25.569 KNEE PAIN, UNSPECIFIED CHRONICITY, UNSPECIFIED LATERALITY: ICD-10-CM

## 2022-05-10 PROCEDURE — 73721 MRI JNT OF LWR EXTRE W/O DYE: CPT

## 2022-05-10 PROCEDURE — G1004 CDSM NDSC: HCPCS

## 2022-05-10 RX ORDER — PANTOPRAZOLE SODIUM 40 MG/1
40 TABLET, DELAYED RELEASE ORAL DAILY
Qty: 90 TABLET | Refills: 3 | Status: SHIPPED | OUTPATIENT
Start: 2022-05-10

## 2022-05-10 RX ORDER — POTASSIUM CHLORIDE 750 MG/1
10 CAPSULE, EXTENDED RELEASE ORAL DAILY
Qty: 90 CAPSULE | Refills: 3 | Status: SHIPPED | OUTPATIENT
Start: 2022-05-10

## 2022-05-13 ENCOUNTER — HOSPITAL ENCOUNTER (OUTPATIENT)
Dept: MRI IMAGING | Facility: HOSPITAL | Age: 72
Discharge: HOME | End: 2022-05-13

## 2022-05-13 DIAGNOSIS — M25.561 RIGHT KNEE PAIN: ICD-10-CM

## 2022-05-13 DIAGNOSIS — M25.562 LEFT KNEE PAIN: ICD-10-CM

## 2022-07-05 DIAGNOSIS — I25.10 CORONARY ARTERY DISEASE INVOLVING NATIVE CORONARY ARTERY OF NATIVE HEART WITHOUT ANGINA PECTORIS: ICD-10-CM

## 2022-07-05 RX ORDER — CLOPIDOGREL BISULFATE 75 MG/1
75 TABLET ORAL DAILY
Qty: 90 TABLET | Refills: 3 | Status: SHIPPED | OUTPATIENT
Start: 2022-07-05

## 2022-07-05 NOTE — TELEPHONE ENCOUNTER
Pt has a new pharmacy it's [facility] he needs his Plavix 75mg 1/D called in  Any questions please call pt either home or cell

## 2022-08-25 ENCOUNTER — HOSPITAL ENCOUNTER (OUTPATIENT)
Dept: NON INVASIVE DIAGNOSTICS | Facility: HOSPITAL | Age: 72
Discharge: HOME | End: 2022-08-25
Attending: INTERNAL MEDICINE
Payer: MEDICARE

## 2022-08-25 VITALS
BODY MASS INDEX: 33.11 KG/M2 | DIASTOLIC BLOOD PRESSURE: 64 MMHG | WEIGHT: 258 LBS | HEIGHT: 74 IN | HEART RATE: 72 BPM | SYSTOLIC BLOOD PRESSURE: 138 MMHG

## 2022-08-25 DIAGNOSIS — I34.0 NON-RHEUMATIC MITRAL REGURGITATION: ICD-10-CM

## 2022-08-25 DIAGNOSIS — I25.10 CORONARY ARTERY DISEASE INVOLVING NATIVE CORONARY ARTERY OF NATIVE HEART WITHOUT ANGINA PECTORIS: ICD-10-CM

## 2022-08-25 LAB
AORTIC ROOT: 3.8 CM
AORTIC VALVE MEAN VELOCITY: 5.1 M/S
APICAL FOUR CHAMBER EJECTION FRACTION: 63 %
ASCENDING AORTA: 3.4 CM
AV LVOT MEAN GRADIENT: 1 MMHG
AV LVOT PEAK GRADIENT: 3 MMHG
AV MEAN GRADIENT: 1 MMHG
AV PEAK GRADIENT: 3 MMHG
AV VELOCITY RATIO: 0.97
DOP CALC AO PEAK VEL: 0.88 M/S
DOP CALC AO VTI: 15.4 CM
DOP CALC LVOT PEAK VEL VTI: 18.9 CM
DOP CALC LVOT PEAK VEL: 0.85 M/S
E WAVE DECELERATION TIME: 157 MS
FRACTIONAL SHORTENING: 23 % (ref 28–44)
INTERVENTRICULAR SEPTUM IN DIASTOLE (PARASTERNAL SHORT AXIS VIEW): 1.4 CM
INTERVENTRICULAR SEPTUM: 1.4 CM (ref 0.6–1.1)
LAAS-AP2: 24.1 CM2
LAAS-AP4: 23.8 CM2
LEFT ATRIUM SIZE: 4.8 CM
LEFT INTERNAL DIMENSION IN SYSTOLE: 3.3 CM (ref 2.1–4)
LEFT VENTRICLE DIASTOLIC VOLUME (MOD BIPLANE): 94 ML
LEFT VENTRICLE SYSTOLIC VOLUME (MOD BIPLANE): 41 ML
LEFT VENTRICULAR INTERNAL DIMENSION IN DIASTOLE: 4.3 CM (ref 3.5–6)
LEFT VENTRICULAR POSTERIOR WALL IN END DIASTOLE: 2 CM
LEFT VENTRICULAR STROKE VOLUME: 37 ML
LV EF: 57 %
LVSV (TEICH): 37 ML
MV E'TISSUE VEL-LAT: 10 CM/S
MV E'TISSUE VEL-SEP: 6 CM/S
MV PEAK A VEL: 0.53 M/S
MV PEAK E VEL: 43 CM/S
MV STENOSIS PRESSURE HALF TIME: 46 MS
MV VALVE AREA P 1/2 METHOD: 4.78 CM2
PV PEAK GRADIENT: 1 MMHG
RIGHT ATRIUM AREA SYSTOLE A4C: 28.2 CM2
RIGHT VENTRICLE ID DIMENSION: 4.7 CM
SL CV LEFT ATRIUM LENGTH A2C: 5.9 CM
SL CV PED ECHO LEFT VENTRICLE DIASTOLIC VOLUME (MOD BIPLANE) 2D: 82 ML
SL CV PED ECHO LEFT VENTRICLE SYSTOLIC VOLUME (MOD BIPLANE) 2D: 46 ML
TR MAX PG: 24 MMHG
TR PEAK VELOCITY: 2.5 M/S
TRICUSPID VALVE PEAK REGURGITATION VELOCITY: 2.47 M/S

## 2022-08-25 PROCEDURE — 93306 TTE W/DOPPLER COMPLETE: CPT

## 2022-08-26 ENCOUNTER — TELEPHONE (OUTPATIENT)
Dept: CARDIOLOGY CLINIC | Facility: CLINIC | Age: 72
End: 2022-08-26

## 2022-08-26 NOTE — TELEPHONE ENCOUNTER
----- Message from [facility], DO sent at 8/25/2022  5:53 PM EDT -----  Can you please call the patient and let him know that his echocardiogram showed no significant change when compared to his prior study 2/2020  Please let him know that we will discuss details at follow up unless he has any specific questions for me now  Thank you

## 2022-09-09 ENCOUNTER — OFFICE VISIT (OUTPATIENT)
Dept: CARDIOLOGY CLINIC | Facility: CLINIC | Age: 72
End: 2022-09-09
Payer: MEDICARE

## 2022-09-09 VITALS
WEIGHT: 251 LBS | SYSTOLIC BLOOD PRESSURE: 158 MMHG | DIASTOLIC BLOOD PRESSURE: 80 MMHG | HEIGHT: 74 IN | BODY MASS INDEX: 32.21 KG/M2 | HEART RATE: 78 BPM | OXYGEN SATURATION: 97 %

## 2022-09-09 DIAGNOSIS — I34.0 NON-RHEUMATIC MITRAL REGURGITATION: ICD-10-CM

## 2022-09-09 DIAGNOSIS — R06.09 DYSPNEA ON EXERTION: ICD-10-CM

## 2022-09-09 DIAGNOSIS — I65.21 CAROTID OCCLUSION, RIGHT: ICD-10-CM

## 2022-09-09 DIAGNOSIS — E78.2 MIXED HYPERLIPIDEMIA: ICD-10-CM

## 2022-09-09 DIAGNOSIS — Z95.1 S/P CABG (CORONARY ARTERY BYPASS GRAFT): ICD-10-CM

## 2022-09-09 DIAGNOSIS — I25.10 CORONARY ARTERY DISEASE INVOLVING NATIVE CORONARY ARTERY OF NATIVE HEART WITHOUT ANGINA PECTORIS: Primary | ICD-10-CM

## 2022-09-09 DIAGNOSIS — I10 PRIMARY HYPERTENSION: ICD-10-CM

## 2022-09-09 DIAGNOSIS — E66.9 OBESITY (BMI 30-39.9): ICD-10-CM

## 2022-09-09 DIAGNOSIS — I73.9 PERIPHERAL VASCULAR DISEASE (HCC): ICD-10-CM

## 2022-09-09 DIAGNOSIS — I48.0 PAF (PAROXYSMAL ATRIAL FIBRILLATION) (HCC): ICD-10-CM

## 2022-09-09 PROCEDURE — 99214 OFFICE O/P EST MOD 30 MIN: CPT | Performed by: INTERNAL MEDICINE

## 2022-09-09 RX ORDER — SOTALOL HYDROCHLORIDE 120 MG/1
120 TABLET ORAL 2 TIMES DAILY
Qty: 180 TABLET | Refills: 3 | Status: SHIPPED | OUTPATIENT
Start: 2022-09-09

## 2022-09-09 NOTE — PROGRESS NOTES
Cardiology Office Visit    Justine Omalley  [id number]  1950    [facility] [place]  [address] [facility] [address] [address] [place] [address] [phone]      Dear Ebenezer Salomon MD,    I had the pleasure of seeing your patient at our [address] [facility] [facility] office today 9/9/2022  As you know he is a pleasant 74y o  year old male with a medical history as described below  Reason for office visit: Follow for coronary artery disease, atrial fibrillation, hypertension, hyperlipidemia, mitral regurgitation and carotid artery disease          1  Coronary artery disease involving native coronary artery of native heart without angina pectoris  Assessment & Plan:  Coronary artery disease:  A   NSTEMI 10/01/2008  B   2 Taxus DEIRDRE to the RCA 10/01/2008  C  Moderate left main disease on cardiac catheterization 2008, 2012 and 2014  D  Cardiac catheterization 06/29/2016 with FFR less than 0 75 with left main 50% stenotic  E   CABG x 2  LIMA-LAD  SVG-OM 7/29/16  F   Non ischemic Lexiscan nuclear stress test 9/2/2021    Patient denies any cardiac symptoms at present  He denies chest pain or change in baseline dyspnea on exertion  Nuclear stress test 9/2/2021 showed no evidence of ischemia  He is currently on clopidogrel and eliquis  No aspirin  Continue atorvastatin for goal LDL < 70  ECG 9/9/2022 is normal    Updated echocardiogram to reassess heart structure and function  Orders:  -     POCT ECG    2  S/P CABG (coronary artery bypass graft)  Assessment & Plan:  CABG x 2: LIMA-LAD  SVG-OM 7/29/16  [facility]        3  Primary hypertension  Assessment & Plan:  Systolic blood pressure is elevated on exam (158/80)  I have asked the patient to monitor at home and I have asked him to call later this week with an update of blood pressures  If blood pressure remains elevated can consider changing to stronger ARB vs adding amlodipine         4  Mixed hyperlipidemia  Assessment & Plan:  Lipid panel 4/5/2022: C 123  T 101  H 41  L 62  Atorvastatin 40 mg     Goal LDL < 70  Currently at goal    Should have repeat lipid panel 4/2023  5  PAF (paroxysmal atrial fibrillation) ([address] )  Assessment & Plan:  Patient remains in normal sinus rhythm on Sotalol 120 mg twice daily  ECG 9/9/2022 with normal sinus rhythm  Eliquis 5 mg twice daily for anticoagulation  He follows with EP (Dr Riky Thornton) at [facility] and was last seen 6/8/2022 at which time no changes are made to the patient's regimen  Orders:  -     sotalol (BETAPACE) 120 mg tablet; Take 1 tablet (120 mg total) by mouth 2 (two) times a day  -     POCT ECG    6  Non-rheumatic mitral regurgitation  Assessment & Plan:  Echocardiogram 12/22/16 showed mild to moderate central mitral regurgitation  Repeat echocardiogram 02/17/2020 showed mild central mitral regurgitation  Will plan repeat echocardiogram now  7  Carotid occlusion, right  Assessment & Plan:  Carotid Doppler 06/05/2017 with known occlusion of the right internal carotid artery  50 to 69% stenosis of the left internal carotid artery  Patient was being followed by vascular surgery at [facility] (Dr Jurline Gilford) with last office visit 11/20/19 and updated carotid Doppler done that day  Recent carotid duplex 12/10/2021 which was unchanged per report from patient  Dr Jurline Gilford is no longer at the group and he has not seen anyone new as of now (will await input from new vascular surgeon)  Continue aggressive medical therapy  Discussed signs and symptoms of stroke with the patient previously  8  Obesity (BMI 30-39  9)  Assessment & Plan: Body mass index is 32 23 kg/m²  Discussed the importance of resuming exercise program and trying to maintain a healthy diet  9  Dyspnea on exertion  Assessment & Plan:  Patient with stable dyspnea on exertion since his last office visit     Will plan updated echocardiogram   Patient was instructed to call if he notices any change/worsening of symptoms  10  Peripheral vascular disease ([address] )  Assessment & Plan:  See discussion under carotid occlusion  HPI   Patient with history of coronary artery disease dating back to 10/2008 when he had DEIRDRE x 2 placed to an occluded right coronary artery  He ultimately underwent CABG x 2 with LIMA-LAD and SVG-OM 7/29/16  He had post operative atrial fibrillation and follows with Dr Steve Hagan at [facility] for EP  He was last seen 6/2019  Cornelius Salas is on sotalol  He has known carotid occlusion for which he was following with Dr Graciela Prajapati (vascular surgery)  2/9/2022: Patient presents to the office today for follow up  He was seen by Dr Steve Hagan 12/20/2021 and no changes were made  No chest pain  No shortness of breath  Dyspnea on exertion with certain activities  No palpitations  No bleeding issues  He does have bruising  Rare lightheadedness (positional)  Blood pressure was high the last few days  Carotid ultrasound 12/10/21 was unchanged from prior  9/9/2022: Patient presents to the office today for follow up  He feels well in general  He is having occasional lightheadedness (not positional)  He was having issues with hematuria and saw urology  Clopidogrel was held for 1 week  Hematuria has resolved  No chest pain  No shortness of breath  He is not exercising due to knee pain  He is going to see [facility] next week  ECG today is normal  Saw Dr Isaak Pollock (EP) 6/8/2022  He has not seen vascular surgery for about 2 years  Reviewed echocardiogram 8/25/2022  ECG today is normal      Patient Active Problem List   Diagnosis    Carotid occlusion, right    CAD (coronary artery disease)    Hypertension    Hyperlipidemia    Non-rheumatic mitral regurgitation    Obesity (BMI 30-39  9)    [facility]ox[facility]    S/P CABG (coronary artery bypass graft)    Dyspnea on exertion    Peripheral vascular disease [facility])     Past Medical History:   Diagnosis Date    Atrial fibrillation ([address] )     Carotid occlusion, right     Coronary artery disease     GERD (gastroesophageal reflux disease)     Hyperlipidemia     Hypertension     Myocardial infarct [facility])      Social History     Socioeconomic History    Marital status: /Civil Union     Spouse name: Not on file    Number of children: Not on file    Years of education: Not on file    Highest education level: Not on file   Occupational History    Occupation: Retired   Tobacco Use    Smoking status: Former Smoker    Smokeless tobacco: Never Used   Vaping Use    Vaping Use: Never used   Substance and Sexual Activity    Alcohol use: Yes     Alcohol/week: 1 0 standard drink     Types: 1 Cans of beer per week    Drug use: Never    Sexual activity: Not on file     Comment: Defer   Other Topics Concern    Not on file   Social History Narrative    Not on file     Social Determinants of Health     Financial Resource Strain: Not on file   Food Insecurity: Not on file   Transportation Needs: Not on file   Physical Activity: Not on file   Stress: Not on file   Social Connections: Not on file   Intimate Partner Violence: Not on file   Housing Stability: Not on file      Family History   Problem Relation Age of Onset    Heart disease Mother     Heart failure Father      Past Surgical History:   Procedure Laterality Date    CARDIAC CATHETERIZATION  10/01/2008    RCA with 100% subacute occlusion  DEIRDRE x2 to RCA  Ostial left main disease with FFR 0 78      CARDIAC CATHETERIZATION  03/16/2012    Moderate 50% ostial left main  FFR 0 82  Distal RCA 40%  No intervention   CARDIAC CATHETERIZATION  11/24/2014    Ostial left main 60%  Ostial left circumflex 70%   CARDIAC CATHETERIZATION  06/29/2016    Left main 50%  FFR was less than 0 75  CABG recommended   CARDIOVERSION  07/01/2017    CEREBRAL ANGIOGRAM  12/12/2013    Carotid angiography   Left internal carotid artery 40%  Right internal carotid artery 100% occlusion   CHOLECYSTECTOMY      CORONARY ARTERY BYPASS GRAFT  07/29/2016    LIMA-LAD  SVG-OM   LUMBAR DISC SURGERY      TONSILLECTOMY         Current Outpatient Medications:     acetaminophen (TYLENOL) 500 mg tablet, Take by mouth, Disp: , Rfl:     allopurinol (ZYLOPRIM) 300 mg tablet, Take 300 mg by mouth daily , Disp: , Rfl:     apixaban (Eliquis) 5 mg, Take 1 tablet (5 mg total) by mouth every 12 (twelve) hours, Disp: 180 tablet, Rfl: 3    atorvastatin (LIPITOR) 40 mg tablet, Take 1 tablet (40 mg total) by mouth daily, Disp: 90 tablet, Rfl: 3    Cholecalciferol (VITAMIN D3 HIGH POTENCY PO), Take by mouth, Disp: , Rfl:     clopidogrel (Plavix) 75 mg tablet, Take 1 tablet (75 mg total) by mouth daily, Disp: 90 tablet, Rfl: 3    diazepam (VALIUM) 5 mg tablet, Take 5 mg by mouth every 6 (six) hours as needed for anxiety, Disp: , Rfl:     finasteride (PROSCAR) 5 mg tablet, Take by mouth, Disp: , Rfl:     losartan (COZAAR) 100 MG tablet, Take 1 tablet (100 mg total) by mouth daily, Disp: 90 tablet, Rfl: 3    nitroglycerin (NITROSTAT) 0 4 mg SL tablet, , Disp: , Rfl:     pantoprazole (PROTONIX) 40 mg tablet, Take 1 tablet (40 mg total) by mouth daily, Disp: 90 tablet, Rfl: 3    potassium chloride (MICRO-K) 10 MEQ CR capsule, Take 1 capsule (10 mEq total) by mouth daily, Disp: 90 capsule, Rfl: 3    sotalol (BETAPACE) 120 mg tablet, Take 1 tablet (120 mg total) by mouth 2 (two) times a day, Disp: 180 tablet, Rfl: 3    tamsulosin (FLOMAX) 0 4 mg, Take by mouth, Disp: , Rfl:     hydrochlorothiazide (HYDRODIURIL) 25 mg tablet, Take 1 tablet (25 mg total) by mouth daily, Disp: 30 tablet, Rfl: 11     No Known Allergies      CardiacTest Results:     ECG 9/9/2022: Normal sinus rhythm  Normal ECG  Lipid panel 4/5/2022: C 123  T 101  H 41  L 62  Carotid Duplex 12/10/2021: No report available (will try to obtain)       Lexiscan nuclear stress test 9/2/2021:   Fernando protocol attempted  Transitioned to [facility] as heart rate blunted  No evidence of ischemia  Fixed inferior defect in RCA territory  EF 52%  Lipid panel 3/2/2021: C 131  T 142  H 37  L 66  Carotid Duplex 11/23/2020: JAY known 100% occlusion  59-97% LICA stenosis  No change from prior study       Echocardiogram 2/17/2020:  Mildly dilated left ventricle with normal left ventricular systolic function  EF estimated at 60-65%  Mild concentric left ventricular hypertrophy  Abnormal septal motion likely due to known cardiac surgery  Mild diastolic dysfunction  Moderately dilated right ventricle with normal right ventricular systolic function  Mild left and right atrial enlargement  Aortic sclerosis without stenosis  Mitral annular calcification  Mild central mitral regurgitation  Mild tricuspid regurgitation  Estimated PASP 30 mmHg         Cv Stress Nuclear Pharm: Result Date: 5/4/2018  No evidence of ECG changes to suggest ischemia  · The patient reported shortness of breath during the stress test  · Blood pressure demonstrated a hypotensive response to stress  · There is a small to moderate sized non-reversible (scar/infarct) defect of mild severity present in the basal anterior and basal inferior wall(s), mostly normalized with CT correction, consistent with diaphragmatic attenuation  · Left ventricular perfusion is probably normal  No ischemia · This is a low risk study  Arterial duplex 11/06/2013:  No abdominal aortic aneurysm   No significant aortoiliac disease  Stress echocardiogram 6/02/2016:  Fernando  7:42 seconds   8 3 METs   Hypotension with exercise   66% of maximum predicted heart rate   Frequent PACs and PVCs   Atrial tachycardia   No ischemia at submaximal heart rate      Carotid duplex 06/05/2017:  Occluded right internal carotid artery   50 to 69% left internal carotid artery stenosis    Twelve month follow-up recommended      Echocardiogram 12/22/2016:  Mildly dilated LV   EF 55%   Abnormal septal motion   Mild concentric left ventricular hypertrophy   Mildly enlarged RV with normal RV systolic function   Mild right atrial enlargement/left atrial enlargement   Aortic sclerosis   No AI   MAC   Mild to moderate central MR   Mild TR   Top-normal PASP 39 mmHg  Review of Systems:    Review of Systems   Constitutional: Negative for activity change, appetite change and fatigue  HENT: Negative for congestion, hearing loss, tinnitus and trouble swallowing  Eyes: Negative for visual disturbance  Respiratory: Negative for cough, chest tightness, shortness of breath and wheezing  Stable dyspnea on exertion   Cardiovascular: Negative for chest pain, palpitations and leg swelling  Gastrointestinal: Negative for abdominal distention, abdominal pain, nausea and vomiting  Genitourinary: Negative for difficulty urinating  Musculoskeletal: Negative for arthralgias  Skin: Negative for rash  Neurological: Negative for dizziness, syncope and light-headedness  Hematological: Bruises/bleeds easily  Psychiatric/Behavioral: Negative for confusion  The patient is not nervous/anxious  All other systems reviewed and are negative  Vitals:    09/09/22 0834 09/09/22 0907   BP: 138/88 158/80   BP Location:  Left arm   Patient Position:  Sitting   Pulse: 78    SpO2: 97%    Weight: 114 kg (251 lb)    Height: 6' 2" (1 88 m)      Vitals:    09/09/22 0834   Weight: 114 kg (251 lb)     Height: 6' 2" (188 cm)     Physical Exam  Vitals reviewed  Constitutional:       Appearance: He is well-developed  He is obese  HENT:      Head: Normocephalic and atraumatic  Eyes:      Conjunctiva/sclera: Conjunctivae normal       Pupils: Pupils are equal, round, and reactive to light  Neck:      Vascular: No JVD  Cardiovascular:      Rate and Rhythm: Normal rate and regular rhythm  Heart sounds: Normal heart sounds  No murmur heard  No friction rub  No gallop  Pulmonary:      Effort: Pulmonary effort is normal       Breath sounds: Normal breath sounds  Abdominal:      General: Bowel sounds are normal       Palpations: Abdomen is soft  Musculoskeletal:      Cervical back: Normal range of motion  Right lower leg: Edema (Trivial) present  Left lower leg: Edema ( trivial) present  Skin:     General: Skin is warm and dry  Neurological:      Mental Status: He is alert and oriented to person, place, and time     Psychiatric:         Behavior: Behavior normal

## 2022-09-09 NOTE — PATIENT INSTRUCTIONS
Blood pressure is high today  Monitor at home for the next 7 days and call the office next Thursday with readings  We may need to add additional blood pressure medication

## 2022-09-15 ENCOUNTER — TELEPHONE (OUTPATIENT)
Dept: CARDIOLOGY CLINIC | Facility: CLINIC | Age: 72
End: 2022-09-15

## 2022-09-15 DIAGNOSIS — I10 PRIMARY HYPERTENSION: Primary | ICD-10-CM

## 2022-09-15 NOTE — TELEPHONE ENCOUNTER
Pt was told to call in today with his BP readings    9/9    136/79  PM    9/10   153/90 Am            144/78 PM    9/11   144/83 AM            148/80 PM    9/12    153/91 Am             142/80 PM    9/13    153/82 Am             133/80 PM    9/14   150/81 Am            135/76 PM    9/15   147/76 AM

## 2022-09-15 NOTE — TELEPHONE ENCOUNTER
Blood pressure remains above goal  If he is willing I will order hydrochlorothiazide to take with his losartan  I would want to recheck blood work 2 weeks after starting  If agreeable I will order med to pharmacy of choice and blood work    Thank you

## 2022-09-16 RX ORDER — HYDROCHLOROTHIAZIDE 25 MG/1
25 TABLET ORAL DAILY
Qty: 30 TABLET | Refills: 11 | Status: SHIPPED | OUTPATIENT
Start: 2022-09-16 | End: 2022-10-28 | Stop reason: SINTOL

## 2022-09-16 NOTE — TELEPHONE ENCOUNTER
Sent HCTZ to [facility] blood test in 2 weeks to monitor kidney function and electrolytes  Please have him let us know blood pressures in the next 2 weeks with this addition  Thank you!

## 2022-09-16 NOTE — TELEPHONE ENCOUNTER
Call to patient, notified that he needs to keep recordings and get blood work in 2 weeks  Patient to get blood work at lab in [place]  Order mailed to patient

## 2022-09-20 ENCOUNTER — TELEPHONE (OUTPATIENT)
Dept: CARDIOLOGY CLINIC | Facility: CLINIC | Age: 72
End: 2022-09-20

## 2022-09-20 PROCEDURE — 93000 ELECTROCARDIOGRAM COMPLETE: CPT | Performed by: INTERNAL MEDICINE

## 2022-09-20 NOTE — TELEPHONE ENCOUNTER
Danika Lee from [facility] stating that pt is having a knee replacement on 11/8 and said that pt would need a clearance  The pt was just in on 9/9  Please call Andres Dent @ [phone]

## 2022-09-21 NOTE — ASSESSMENT & PLAN NOTE
Patient with stable dyspnea on exertion since his last office visit  Will plan updated echocardiogram   Patient was instructed to call if he notices any change/worsening of symptoms

## 2022-09-21 NOTE — ASSESSMENT & PLAN NOTE
Patient remains in normal sinus rhythm on Sotalol 120 mg twice daily  ECG 9/9/2022 with normal sinus rhythm  Eliquis 5 mg twice daily for anticoagulation  He follows with EP (Dr Adriana Basurto) at [facility] and was last seen 6/8/2022 at which time no changes are made to the patient's regimen

## 2022-09-21 NOTE — ASSESSMENT & PLAN NOTE
Coronary artery disease:  A   NSTEMI 10/01/2008  B   2 Taxus DEIRDRE to the RCA 10/01/2008  C  Moderate left main disease on cardiac catheterization 2008, 2012 and 2014  D  Cardiac catheterization 06/29/2016 with FFR less than 0 75 with left main 50% stenotic  E   CABG x 2  LIMA-LAD  SVG-OM 7/29/16  F   Non ischemic Lexiscan nuclear stress test 9/2/2021    Patient denies any cardiac symptoms at present  He denies chest pain or change in baseline dyspnea on exertion  Nuclear stress test 9/2/2021 showed no evidence of ischemia  He is currently on clopidogrel and eliquis  No aspirin  Continue atorvastatin for goal LDL < 70  ECG 9/9/2022 is normal    Updated echocardiogram to reassess heart structure and function

## 2022-09-21 NOTE — ASSESSMENT & PLAN NOTE
Carotid Doppler 06/05/2017 with known occlusion of the right internal carotid artery  50 to 69% stenosis of the left internal carotid artery  Patient was being followed by vascular surgery at [facility] (Dr Oral Antonio) with last office visit 11/20/19 and updated carotid Doppler done that day  Recent carotid duplex 12/10/2021 which was unchanged per report from patient  Dr Oral Antonio is no longer at the group and he has not seen anyone new as of now (will await input from new vascular surgeon)  Continue aggressive medical therapy  Discussed signs and symptoms of stroke with the patient previously

## 2022-09-21 NOTE — ASSESSMENT & PLAN NOTE
Lipid panel 4/5/2022: C 123  T 101  H 41  L 62  Atorvastatin 40 mg     Goal LDL < 70  Currently at goal    Should have repeat lipid panel 4/2023

## 2022-09-21 NOTE — ASSESSMENT & PLAN NOTE
Systolic blood pressure is elevated on exam (158/80)  I have asked the patient to monitor at home and I have asked him to call later this week with an update of blood pressures  If blood pressure remains elevated can consider changing to stronger ARB vs adding amlodipine

## 2022-09-21 NOTE — ASSESSMENT & PLAN NOTE
Body mass index is 32 23 kg/m²  Discussed the importance of resuming exercise program and trying to maintain a healthy diet

## 2022-09-22 NOTE — TELEPHONE ENCOUNTER
Call to Wayne Andino at [facility] to call back about clearance

## 2022-10-03 ENCOUNTER — TELEPHONE (OUTPATIENT)
Dept: CARDIOLOGY CLINIC | Facility: CLINIC | Age: 72
End: 2022-10-03

## 2022-10-03 NOTE — TELEPHONE ENCOUNTER
Please have him complete the BMP I ordered, as I want to follow up on his electrolytes and kidney function with addition of the hydrochlorothiazide  We could try reducing the HCTZ to 12 5 mg - please see if he has pills that can be split (he may need to check with his pharmacist)  If they cannot be split I will order a new script for him  Thank you!

## 2022-10-03 NOTE — TELEPHONE ENCOUNTER
Call to patient, he stated that he has been takling his medication and taking his Bp reading 2 or more hours after taking medication  Reports his BP readings as such: 9/17/22 am 102/66 pm 138/74  09/18/22 am 131/77 pm 130/68  09/19/22 am 105/62 pm 135/73  09/20/22 am 123/77 pm 137/83  09/21/22 am 107/66 pm 134/79  09/22/22 am 122/74 pm 130/74  09/23/22 am 104/67 pm 121/68   09/24/22 am 119/70 pm 97/57 repeat 98/66  09/25/22 am 140/88 pm 100/62   09/26/22 am 100/59 pm 147/71  09/27/22 am 125/81 pm 126/71  09/28/22 am 113/59 pm 92/55   09/29/22 am 110/67 pm none taken  09/30/22 am 97/59 pm 131/80  10/01/22 am 139/77 pm 125/63  10/02/22 am 111/75 pm 138/77   Patient reports that he has noticed an increase of lightheadedness

## 2022-10-03 NOTE — TELEPHONE ENCOUNTER
Call to patient, he stated that he went for blood work this morning and that his pills are scored  He can use his pill cutter and cut the remaining bottle that he has

## 2022-10-06 ENCOUNTER — TELEPHONE (OUTPATIENT)
Dept: CARDIOLOGY CLINIC | Facility: CLINIC | Age: 72
End: 2022-10-06

## 2022-10-12 NOTE — TELEPHONE ENCOUNTER
Can you please find out which lab he went to for blood work? The results are not available in his chart or through [facility]  Please obtain BMP results    Thank you

## 2022-10-20 NOTE — TELEPHONE ENCOUNTER
Pt is aware  States that his BP has been all over  Ranging from 95//83 he does get lightheaded at times still

## 2022-10-24 ENCOUNTER — OFFICE VISIT (OUTPATIENT)
Dept: LAB | Facility: HOSPITAL | Age: 72
End: 2022-10-24

## 2022-10-24 ENCOUNTER — APPOINTMENT (OUTPATIENT)
Dept: LAB | Facility: HOSPITAL | Age: 72
End: 2022-10-24
Payer: MEDICARE

## 2022-10-24 ENCOUNTER — TELEPHONE (OUTPATIENT)
Dept: CARDIOLOGY CLINIC | Facility: CLINIC | Age: 72
End: 2022-10-24

## 2022-10-24 DIAGNOSIS — Z01.89 ENCOUNTER FOR OTHER SPECIFIED SPECIAL EXAMINATIONS: ICD-10-CM

## 2022-10-24 DIAGNOSIS — Z01.818 ENCOUNTER FOR OTHER PREPROCEDURAL EXAMINATION: ICD-10-CM

## 2022-10-24 LAB
ALBUMIN SERPL BCP-MCNC: 3.6 G/DL (ref 3.5–5)
ALP SERPL-CCNC: 66 U/L (ref 46–116)
ALT SERPL W P-5'-P-CCNC: 17 U/L (ref 12–78)
ANION GAP SERPL CALCULATED.3IONS-SCNC: 5 MMOL/L (ref 4–13)
AST SERPL W P-5'-P-CCNC: 14 U/L (ref 5–45)
BASOPHILS # BLD AUTO: 0.03 THOUSANDS/ÂΜL (ref 0–0.1)
BASOPHILS NFR BLD AUTO: 0 % (ref 0–1)
BILIRUB SERPL-MCNC: 0.72 MG/DL (ref 0.2–1)
BUN SERPL-MCNC: 21 MG/DL (ref 5–25)
CALCIUM SERPL-MCNC: 8.8 MG/DL (ref 8.3–10.1)
CHLORIDE SERPL-SCNC: 104 MMOL/L (ref 96–108)
CO2 SERPL-SCNC: 30 MMOL/L (ref 21–32)
CREAT SERPL-MCNC: 1.22 MG/DL (ref 0.6–1.3)
EOSINOPHIL # BLD AUTO: 0.1 THOUSAND/ÂΜL (ref 0–0.61)
EOSINOPHIL NFR BLD AUTO: 1 % (ref 0–6)
ERYTHROCYTE [DISTWIDTH] IN BLOOD BY AUTOMATED COUNT: 13.6 % (ref 11.6–15.1)
GFR SERPL CREATININE-BSD FRML MDRD: 58 ML/MIN/1.73SQ M
GLUCOSE SERPL-MCNC: 112 MG/DL (ref 65–140)
HCT VFR BLD AUTO: 39.8 % (ref 36.5–49.3)
HGB BLD-MCNC: 13.2 G/DL (ref 12–17)
IMM GRANULOCYTES # BLD AUTO: 0.05 THOUSAND/UL (ref 0–0.2)
IMM GRANULOCYTES NFR BLD AUTO: 1 % (ref 0–2)
INR PPP: 1.1 (ref 0.84–1.19)
LYMPHOCYTES # BLD AUTO: 2.96 THOUSANDS/ÂΜL (ref 0.6–4.47)
LYMPHOCYTES NFR BLD AUTO: 29 % (ref 14–44)
MCH RBC QN AUTO: 32.6 PG (ref 26.8–34.3)
MCHC RBC AUTO-ENTMCNC: 33.2 G/DL (ref 31.4–37.4)
MCV RBC AUTO: 98 FL (ref 82–98)
MONOCYTES # BLD AUTO: 0.77 THOUSAND/ÂΜL (ref 0.17–1.22)
MONOCYTES NFR BLD AUTO: 8 % (ref 4–12)
NEUTROPHILS # BLD AUTO: 6.24 THOUSANDS/ÂΜL (ref 1.85–7.62)
NEUTS SEG NFR BLD AUTO: 61 % (ref 43–75)
NRBC BLD AUTO-RTO: 0 /100 WBCS
PLATELET # BLD AUTO: 214 THOUSANDS/UL (ref 149–390)
PMV BLD AUTO: 9.2 FL (ref 8.9–12.7)
POTASSIUM SERPL-SCNC: 4.1 MMOL/L (ref 3.5–5.3)
PROT SERPL-MCNC: 7 G/DL (ref 6.4–8.4)
PROTHROMBIN TIME: 14.3 SECONDS (ref 11.6–14.5)
RBC # BLD AUTO: 4.05 MILLION/UL (ref 3.88–5.62)
SODIUM SERPL-SCNC: 139 MMOL/L (ref 135–147)
WBC # BLD AUTO: 10.15 THOUSAND/UL (ref 4.31–10.16)

## 2022-10-24 PROCEDURE — 80053 COMPREHEN METABOLIC PANEL: CPT

## 2022-10-24 PROCEDURE — 36415 COLL VENOUS BLD VENIPUNCTURE: CPT

## 2022-10-24 PROCEDURE — 85025 COMPLETE CBC W/AUTO DIFF WBC: CPT

## 2022-10-24 PROCEDURE — 85610 PROTHROMBIN TIME: CPT

## 2022-10-24 NOTE — TELEPHONE ENCOUNTER
Please have him stop the HCTZ  Please bring in this week for nurse visit BP check with his home cuff to verify accuracy of his cuff  Thank you!

## 2022-10-24 NOTE — TELEPHONE ENCOUNTER
Pt stopped in saying that he will need more HCTZ, but he is still lightheaded at times with the 12 5 mg tab  States that his bp has been all over   From 90s to 140s

## 2022-10-27 ENCOUNTER — CLINICAL SUPPORT (OUTPATIENT)
Dept: CARDIOLOGY CLINIC | Facility: CLINIC | Age: 72
End: 2022-10-27

## 2022-10-27 VITALS
HEART RATE: 81 BPM | DIASTOLIC BLOOD PRESSURE: 72 MMHG | SYSTOLIC BLOOD PRESSURE: 133 MMHG | TEMPERATURE: 97.2 F | BODY MASS INDEX: 32.43 KG/M2 | WEIGHT: 252.6 LBS | OXYGEN SATURATION: 98 %

## 2022-10-27 DIAGNOSIS — I10 PRIMARY HYPERTENSION: Primary | ICD-10-CM

## 2022-10-28 ENCOUNTER — TELEPHONE (OUTPATIENT)
Dept: CARDIOLOGY CLINIC | Facility: CLINIC | Age: 72
End: 2022-10-28

## 2022-10-28 NOTE — TELEPHONE ENCOUNTER
Please notify patient I reviewed his BP from his nurse visit which is excellent  Stay off HCTZ  Continue other medications the same  Continue to monitor BP at home and report readings consistently > 140/90

## 2022-10-30 LAB
ATRIAL RATE: 75 BPM
P AXIS: 6 DEGREES
PR INTERVAL: 218 MS
QRS AXIS: 47 DEGREES
QRSD INTERVAL: 94 MS
QT INTERVAL: 396 MS
QTC INTERVAL: 442 MS
T WAVE AXIS: 41 DEGREES
VENTRICULAR RATE: 75 BPM

## 2022-11-01 ENCOUNTER — TELEPHONE (OUTPATIENT)
Dept: CARDIOLOGY CLINIC | Facility: CLINIC | Age: 72
End: 2022-11-01

## 2022-11-01 NOTE — TELEPHONE ENCOUNTER
Received fax that patient is scheduled for right TKR, needs cardiac clearance  Papers placed on providers desk

## 2022-12-01 ENCOUNTER — TELEPHONE (OUTPATIENT)
Dept: CARDIOLOGY CLINIC | Facility: CLINIC | Age: 72
End: 2022-12-01

## 2022-12-01 NOTE — TELEPHONE ENCOUNTER
Right before thanksgiving Pt started getting light headness  Here are a few BP over the last few days  117/forgets bottom number, 115/65, 136/71, 115/59, 104/60, 122/65  3 weeks ago he had knee surgery  He doesn't think this has anything to do with it but wanted to mention it

## 2022-12-01 NOTE — TELEPHONE ENCOUNTER
Spoke with patient  He is noticing positional lightheadedness but also noted heart pounding the other evening when lying down  He states he is hydrating very well and feels ok otherwise  He is in agreement to come in tomorrow at 9 am for EKG and orthostatic BP check  Will further adjust medication as needed

## 2022-12-02 ENCOUNTER — CLINICAL SUPPORT (OUTPATIENT)
Dept: CARDIOLOGY CLINIC | Facility: CLINIC | Age: 72
End: 2022-12-02

## 2022-12-02 VITALS
OXYGEN SATURATION: 100 % | TEMPERATURE: 96.2 F | WEIGHT: 247.6 LBS | HEART RATE: 84 BPM | BODY MASS INDEX: 31.78 KG/M2 | SYSTOLIC BLOOD PRESSURE: 150 MMHG | HEIGHT: 74 IN | DIASTOLIC BLOOD PRESSURE: 78 MMHG

## 2022-12-02 DIAGNOSIS — I10 PRIMARY HYPERTENSION: Primary | ICD-10-CM

## 2022-12-02 DIAGNOSIS — I48.0 PAF (PAROXYSMAL ATRIAL FIBRILLATION) (HCC): ICD-10-CM

## 2022-12-02 RX ORDER — CELECOXIB 200 MG/1
CAPSULE ORAL
COMMUNITY
Start: 2022-09-15

## 2022-12-02 RX ORDER — TRAMADOL HYDROCHLORIDE 50 MG/1
50 TABLET ORAL EVERY 6 HOURS PRN
COMMUNITY
Start: 2022-11-09 | End: 2023-11-09

## 2022-12-02 NOTE — Clinical Note
BP was 130/58 laying down, 130/78 sitting up, and 150/80 standing  Pt was a little light headed when he sat up  But felt ok when he stood up

## 2022-12-06 ENCOUNTER — TELEPHONE (OUTPATIENT)
Dept: NON INVASIVE DIAGNOSTICS | Facility: HOSPITAL | Age: 72
End: 2022-12-06

## 2022-12-06 NOTE — TELEPHONE ENCOUNTER
I reviewed patient's EKG and orthostatic blood pressures from his nurse visit Friday  His blood pressure did not drop with position change, in fact, it went up  His EKG showed sinus rhythm with frequent PAC's (early beats from the top of the heart)  Please make sure he is taking magnesium supplement 250-500 mg daily  If he continues to notice lightheadedness and heart pounding episodes I would recommend we obtain a 24 hour Holter monitor    Thank you

## 2022-12-08 ENCOUNTER — TELEPHONE (OUTPATIENT)
Dept: CARDIOLOGY CLINIC | Facility: CLINIC | Age: 72
End: 2022-12-08

## 2022-12-08 NOTE — TELEPHONE ENCOUNTER
Pt called and stated he has had a lot of burping the last few weeks  He wants to know what he can take OTC with his Cardiology medications  He is feeling fine and has no other symptoms

## 2022-12-26 DIAGNOSIS — I25.10 CORONARY ARTERY DISEASE INVOLVING NATIVE CORONARY ARTERY OF NATIVE HEART WITHOUT ANGINA PECTORIS: ICD-10-CM

## 2022-12-26 DIAGNOSIS — E78.2 MIXED HYPERLIPIDEMIA: ICD-10-CM

## 2022-12-27 RX ORDER — ATORVASTATIN CALCIUM 40 MG/1
TABLET, FILM COATED ORAL
Qty: 90 TABLET | Refills: 3 | Status: SHIPPED | OUTPATIENT
Start: 2022-12-27

## 2023-01-10 ENCOUNTER — TELEPHONE (OUTPATIENT)
Dept: CARDIOLOGY CLINIC | Facility: CLINIC | Age: 73
End: 2023-01-10

## 2023-01-10 NOTE — TELEPHONE ENCOUNTER
Pt called to let us know he had a stress test through [facility], we should be receiving those results  Also Pt is scheduled to have a colonoscopy on 2/13/2023  They want him to stop plavix on 2/8/2023 and Eliquis on 2/12  He wants to know if this is ok?

## 2023-01-26 DIAGNOSIS — I10 PRIMARY HYPERTENSION: ICD-10-CM

## 2023-01-26 RX ORDER — LOSARTAN POTASSIUM 100 MG/1
TABLET ORAL
Qty: 90 TABLET | Refills: 3 | Status: SHIPPED | OUTPATIENT
Start: 2023-01-26

## 2023-02-03 ENCOUNTER — TELEPHONE (OUTPATIENT)
Dept: CARDIOLOGY CLINIC | Facility: CLINIC | Age: 73
End: 2023-02-03

## 2023-02-03 NOTE — TELEPHONE ENCOUNTER
No, he should not be taking both sotalol and metoprolol  Please obtain a copy of the testing as it is not showing up in care everywhere and find out who ordered the testing and metoprolol    Thank you

## 2023-02-03 NOTE — TELEPHONE ENCOUNTER
Pt states that about a month ago he had a heart monitor and stress test through [facility]  He was given metoprolol for a rapid HR  (metoprolol succinate 25 mg daily)     Asking if this is okay to take with his sotalol  Please advise

## 2023-02-03 NOTE — TELEPHONE ENCOUNTER
It is careeverywhere  Under the documents, its a orders only document from 1/17/2023 metoprolol given by Niall Vieira       The nuc stress is under other results on 12/28/2022 and the long term cardica even monitor is on 1/6/23

## 2023-02-03 NOTE — TELEPHONE ENCOUNTER
Reviewed records from [facility] THE [facility]   The metoprolol was ordered through his electrophysiologist  Can you please contact him to advise that he needs to call them to confirm if they want him on both metoprolol and sotalol

## 2023-02-03 NOTE — TELEPHONE ENCOUNTER
Per Juan A Cox, pt is to call his EP doc and ask them if he supposed to be taking both  Pt is aware

## 2023-02-27 DIAGNOSIS — I48.0 PAF (PAROXYSMAL ATRIAL FIBRILLATION) (HCC): ICD-10-CM

## 2023-02-27 RX ORDER — APIXABAN 5 MG/1
TABLET, FILM COATED ORAL
Qty: 180 TABLET | Refills: 3 | Status: SHIPPED | OUTPATIENT
Start: 2023-02-27

## 2023-03-10 ENCOUNTER — OFFICE VISIT (OUTPATIENT)
Dept: CARDIOLOGY CLINIC | Facility: CLINIC | Age: 73
End: 2023-03-10
Payer: MEDICARE

## 2023-03-10 VITALS
HEIGHT: 74 IN | BODY MASS INDEX: 31.94 KG/M2 | WEIGHT: 248.9 LBS | HEART RATE: 72 BPM | OXYGEN SATURATION: 98 % | SYSTOLIC BLOOD PRESSURE: 166 MMHG | DIASTOLIC BLOOD PRESSURE: 82 MMHG

## 2023-03-10 DIAGNOSIS — R06.09 DYSPNEA ON EXERTION: ICD-10-CM

## 2023-03-10 DIAGNOSIS — E78.2 MIXED HYPERLIPIDEMIA: ICD-10-CM

## 2023-03-10 DIAGNOSIS — I34.0 NON-RHEUMATIC MITRAL REGURGITATION: ICD-10-CM

## 2023-03-10 DIAGNOSIS — I73.9 PERIPHERAL VASCULAR DISEASE (HCC): ICD-10-CM

## 2023-03-10 DIAGNOSIS — I48.0 PAF (PAROXYSMAL ATRIAL FIBRILLATION) (HCC): ICD-10-CM

## 2023-03-10 DIAGNOSIS — E66.9 OBESITY (BMI 30-39.9): ICD-10-CM

## 2023-03-10 DIAGNOSIS — I25.10 CORONARY ARTERY DISEASE INVOLVING NATIVE CORONARY ARTERY OF NATIVE HEART WITHOUT ANGINA PECTORIS: Primary | ICD-10-CM

## 2023-03-10 DIAGNOSIS — I10 PRIMARY HYPERTENSION: ICD-10-CM

## 2023-03-10 DIAGNOSIS — Z95.1 S/P CABG (CORONARY ARTERY BYPASS GRAFT): ICD-10-CM

## 2023-03-10 DIAGNOSIS — I65.21 CAROTID OCCLUSION, RIGHT: ICD-10-CM

## 2023-03-10 PROCEDURE — 99214 OFFICE O/P EST MOD 30 MIN: CPT | Performed by: INTERNAL MEDICINE

## 2023-03-10 RX ORDER — METOPROLOL SUCCINATE 25 MG/1
25 TABLET, EXTENDED RELEASE ORAL DAILY
COMMUNITY

## 2023-03-10 NOTE — PROGRESS NOTES
Cardiology Office Visit    Rere Perea  [id number]  1950    [facility]  [address] [place] [id number] [phone]      Dear Geraldo Almeida MD,    I had the pleasure of seeing your patient at our [facility] [place] office today 3/10/2023. As you know he is a pleasant 74y.o. year old male with a medical history as described below. Reason for office visit: Follow for coronary artery disease, atrial fibrillation, hypertension, hyperlipidemia, mitral regurgitation and carotid artery disease. 1. Coronary artery disease involving native coronary artery of native heart without angina pectoris    2. S/P CABG (coronary artery bypass graft)    3. Primary hypertension    4. Mixed hyperlipidemia    5. PAF (paroxysmal atrial fibrillation) ([address] )    6. Non-rheumatic mitral regurgitation    7. Carotid occlusion, right    8. Obesity (BMI 30-39.9)    9. Dyspnea on exertion    10. Peripheral vascular disease ([address] )       Plan:   Continue current medications. Blood pressure is high today. Recommended that he check blood pressure for the next few days (always at least 2 hours after medications-metoprolol succinate/sotalol). Call me with any change in symptoms. Due for updated lipid/cholesterol panel 4/2023. Lexiscan stress test showed no evidence of ischemia. TRINY reviewed. Runs of SVT. Patient does have follow up with EP this summer and they can decide if patient needs additional monitoring as he is currently asymptomatic.   _______________________        HPI   Patient with history of coronary artery disease dating back to 10/2008 when he had DEIRDRE x 2 placed to an occluded right coronary artery. He ultimately underwent CABG x 2 with LIMA-LAD and SVG-OM 7/29/16. He had post operative atrial fibrillation and follows with Dr. Cookie Lisa at [facility] for EP. He was last seen 6/2019. He is on sotalol.  He has known carotid occlusion for which he was following with Dr. Magdalena Asher (vascular surgery). 2/9/2022: Patient presents to the office today for follow up. He was seen by Dr. Kyle Jamison 12/20/2021 and no changes were made. No chest pain. No shortness of breath. Dyspnea on exertion with certain activities. No palpitations. No bleeding issues. He does have bruising. Rare lightheadedness (positional). Blood pressure was high the last few days. Carotid ultrasound 12/10/21 was unchanged from prior. 9/9/2022: Patient presents to the office today for follow up. He feels well in general. He is having occasional lightheadedness (not positional). He was having issues with hematuria and saw urology. Clopidogrel was held for 1 week. Hematuria has resolved. No chest pain. No shortness of breath. He is not exercising due to knee pain. He is going to see [facility] next week. ECG today is normal. Saw Dr. Cleo Pierre () 6/8/2022. He has not seen vascular surgery for about 2 years. Reviewed echocardiogram 8/25/2022. ECG today is normal.     3/10/2023: Kelly Lopes presents to the office today for follow up. He did have right knee replacement 11/8/2022. He was seen in Dr. Jasmin Burks office by [address] [place] [address] 12/13/2022 at which time he noted lightheadedness. She did ECG, nuclear stress test and ZIO monitor. Nuclear stress test 12/28/2022 showed no ischemia. He had frequent PAC's (10.8%) and 80 episodes of SVT with the longest lasting 33.2 seconds. No chest pain. No shortness of breath. Some dyspnea on exertion. He is not going to the gym anymore. He had his right knee done and needs the left knee done as well. BP medications taken at  7 am. He did not take his metoprolol succinate or sotalol this am.     Patient Active Problem List   Diagnosis    Carotid occlusion, right    CAD (coronary artery disease)    Hypertension    Hyperlipidemia    Non-rheumatic mitral regurgitation    Obesity (BMI 30-39. 9)    PAF (paroxysmal atrial fibrillation) ([facility])    S/P CABG (coronary artery bypass graft) Dyspnea on exertion    Peripheral vascular disease (HCC)     Past Medical History:   Diagnosis Date    Atrial fibrillation (HCC)     Carotid occlusion, right     Coronary artery disease     GERD (gastroesophageal reflux disease)     Hyperlipidemia     Hypertension     Myocardial infarct [facility])      Social History     Socioeconomic History    Marital status: /Civil Union     Spouse name: Not on file    Number of children: Not on file    Years of education: Not on file    Highest education level: Not on file   Occupational History    Occupation: Retired   Tobacco Use    Smoking status: Former    Smokeless tobacco: Never   Vaping Use    Vaping Use: Never used   Substance and Sexual Activity    Alcohol use: Yes     Alcohol/week: 1.0 standard drink     Types: 1 Cans of beer per week     Comment: rarely    Drug use: Never    Sexual activity: Not on file     Comment: Defer   Other Topics Concern    Not on file   Social History Narrative    Not on file     Social Determinants of Health     Financial Resource Strain: Not on file   Food Insecurity: Not on file   Transportation Needs: Not on file   Physical Activity: Not on file   Stress: Not on file   Social Connections: Not on file   Intimate Partner Violence: Not on file   Housing Stability: Not on file      Family History   Problem Relation Age of Onset    Heart disease Mother     Heart failure Father      Past Surgical History:   Procedure Laterality Date    CARDIAC CATHETERIZATION  10/01/2008    RCA with 100% subacute occlusion. DEIRDRE x2 to RCA. Ostial left main disease with FFR 0.78. CARDIAC CATHETERIZATION  03/16/2012    Moderate 50% ostial left main. FFR 0.82. Distal RCA 40%. No intervention. CARDIAC CATHETERIZATION  11/24/2014    Ostial left main 60%. Ostial left circumflex 70%. CARDIAC CATHETERIZATION  06/29/2016    Left main 50%. FFR was less than 0.75. CABG recommended.      CARDIOVERSION  07/01/2017    CEREBRAL ANGIOGRAM  12/12/2013    Carotid angiography. Left internal carotid artery 40%. Right internal carotid artery 100% occlusion. CHOLECYSTECTOMY      CORONARY ARTERY BYPASS GRAFT  07/29/2016    LIMA-LAD. SVG-OM. LUMBAR DISC SURGERY      TONSILLECTOMY         Current Outpatient Medications:     allopurinol (ZYLOPRIM) 300 mg tablet, Take 300 mg by mouth daily , Disp: , Rfl:     atorvastatin (LIPITOR) 40 mg tablet, TAKE 1 TABLET DAILY, Disp: 90 tablet, Rfl: 3    Cholecalciferol (VITAMIN D3 HIGH POTENCY PO), Take by mouth, Disp: , Rfl:     clopidogrel (Plavix) 75 mg tablet, Take 1 tablet (75 mg total) by mouth daily, Disp: 90 tablet, Rfl: 3    Eliquis 5 MG, TAKE 1 TABLET EVERY 12 HOURS, Disp: 180 tablet, Rfl: 3    finasteride (PROSCAR) 5 mg tablet, Take by mouth, Disp: , Rfl:     losartan (COZAAR) 100 MG tablet, TAKE 1 TABLET DAILY, Disp: 90 tablet, Rfl: 3    metoprolol succinate (TOPROL-XL) 25 mg 24 hr tablet, Take 25 mg by mouth daily, Disp: , Rfl:     nitroglycerin (NITROSTAT) 0.4 mg SL tablet, , Disp: , Rfl:     pantoprazole (PROTONIX) 40 mg tablet, Take 1 tablet (40 mg total) by mouth daily, Disp: 90 tablet, Rfl: 3    potassium chloride (MICRO-K) 10 MEQ CR capsule, Take 1 capsule (10 mEq total) by mouth daily, Disp: 90 capsule, Rfl: 3    sotalol (BETAPACE) 120 mg tablet, Take 1 tablet (120 mg total) by mouth 2 (two) times a day, Disp: 180 tablet, Rfl: 3    tamsulosin (FLOMAX) 0.4 mg, Take by mouth, Disp: , Rfl:     traMADol (ULTRAM) 50 mg tablet, Take 50 mg by mouth every 6 (six) hours as needed, Disp: , Rfl:     acetaminophen (TYLENOL) 500 mg tablet, Take by mouth (Patient not taking: Reported on 10/27/2022), Disp: , Rfl:     celecoxib (CeleBREX) 200 mg capsule, , Disp: , Rfl:     diazepam (VALIUM) 5 mg tablet, Take 5 mg by mouth every 6 (six) hours as needed for anxiety (Patient not taking: Reported on 3/10/2023), Disp: , Rfl:      No Known Allergies      CardiacTest Results:     ZIO 1/6/2023:   1.    Underlying rhythm is normal sinus rhythm. 2.   Appropriate chronotropic response and heart rate variability noted. Average heart rate is 76 beats per minute (range: 48 - 211 bpm). 3.   PAC's (10.8%) and PVC's (< 1%). 4.   No atrial fibrillation or atrial flutter noted. 5.   80 SVT episodes noted. Longest 33.2 seconds    6.   5 runs of VT noted. Longest 8 beats. 7.   No pauses were noted. Lexiscan nuclear stress test 12/28/2022: LVH: No scar or ischemia. EF 56%    ECG 9/9/2022: Normal sinus rhythm. Normal ECG. Lipid panel 4/5/2022: C 123. T 101. H 41. L 62. Carotid Duplex 12/10/2021: No report available (will try to obtain). Lexiscan nuclear stress test 9/2/2021:   Fernando protocol attempted. Transitioned to [place] as heart rate blunted. No evidence of ischemia. Fixed inferior defect in RCA territory. EF 52%. Lipid panel 3/2/2021: C 131. T 142. H 37. L 66. Carotid Duplex 11/23/2020: JAY known 100% occlusion. 65-89% LICA stenosis. No change from prior study. Echocardiogram 2/17/2020:  Mildly dilated left ventricle with normal left ventricular systolic function. EF estimated at 60-65%. Mild concentric left ventricular hypertrophy. Abnormal septal motion likely due to known cardiac surgery. Mild diastolic dysfunction. Moderately dilated right ventricle with normal right ventricular systolic function. Mild left and right atrial enlargement. Aortic sclerosis without stenosis. Mitral annular calcification. Mild central mitral regurgitation. Mild tricuspid regurgitation. Estimated PASP 30 mmHg. Cv Stress Nuclear Pharm: Result Date: 5/4/2018  No evidence of ECG changes to suggest ischemia. · The patient reported shortness of breath during the stress test. · Blood pressure demonstrated a hypotensive response to stress.  · There is a small to moderate sized non-reversible (scar/infarct) defect of mild severity present in the basal anterior and basal inferior wall(s), mostly normalized with CT correction, consistent with diaphragmatic attenuation. · Left ventricular perfusion is probably normal. No ischemia · This is a low risk study. Arterial duplex 11/06/2013:  No abdominal aortic aneurysm. No significant aortoiliac disease. Stress echocardiogram 6/02/2016:  Brianne Hurst. 7:42 seconds. 8.3 METs. Hypotension with exercise. 66% of maximum predicted heart rate. Frequent PACs and PVCs. Atrial tachycardia. No ischemia at submaximal heart rate. Carotid duplex 06/05/2017:  Occluded right internal carotid artery. 50 to 69% left internal carotid artery stenosis. Twelve month follow-up recommended. Echocardiogram 12/22/2016:  Mildly dilated LV. EF 55%. Abnormal septal motion. Mild concentric left ventricular hypertrophy. Mildly enlarged RV with normal RV systolic function. Mild right atrial enlargement/left atrial enlargement. Aortic sclerosis. No AI. MAC. Mild to moderate central MR. Mild TR. Top-normal PASP 39 mmHg. Review of Systems:    Review of Systems   Constitutional: Negative for activity change, appetite change and fatigue. HENT: Negative for congestion, hearing loss, tinnitus and trouble swallowing. Eyes: Negative for visual disturbance. Respiratory: Negative for cough, chest tightness, shortness of breath and wheezing. Stable dyspnea on exertion   Cardiovascular: Negative for chest pain, palpitations and leg swelling. Gastrointestinal: Negative for abdominal distention, abdominal pain, nausea and vomiting. Genitourinary: Negative for difficulty urinating. Musculoskeletal: Negative for arthralgias. Skin: Negative for rash. Neurological: Negative for dizziness, syncope and light-headedness. Hematological: Bruises/bleeds easily. Psychiatric/Behavioral: Negative for confusion. The patient is not nervous/anxious. All other systems reviewed and are negative.         Vitals:    03/10/23 0819   BP: 148/80   Pulse: 72   SpO2: 98%   Weight: 113 kg (248 lb 14.4 oz)   Height: 6' 2" (1.88 m)     Vitals:    03/10/23 0819   Weight: 113 kg (248 lb 14.4 oz)     Height: 6' 2" (188 cm)     Physical Exam  Vitals reviewed. Constitutional:       Appearance: He is well-developed. He is obese. HENT:      Head: Normocephalic and atraumatic. Eyes:      Conjunctiva/sclera: Conjunctivae normal.      Pupils: Pupils are equal, round, and reactive to light. Neck:      Vascular: No JVD. Cardiovascular:      Rate and Rhythm: Normal rate and regular rhythm. Heart sounds: Normal heart sounds. No murmur heard. No friction rub. No gallop. Pulmonary:      Effort: Pulmonary effort is normal.      Breath sounds: Normal breath sounds. Abdominal:      General: Bowel sounds are normal.      Palpations: Abdomen is soft. Musculoskeletal:      Cervical back: Normal range of motion. Right lower leg: Edema (Trivial) present. Left lower leg: Edema ( trivial) present. Skin:     General: Skin is warm and dry. Neurological:      Mental Status: He is alert and oriented to person, place, and time.    Psychiatric:         Behavior: Behavior normal.

## 2023-03-10 NOTE — PATIENT INSTRUCTIONS
Continue current medications. Blood pressure is high today. I would check blood pressure for the next few days (always at least 2 hours after medications-metoprolol succinate/sotalol). Call me with any change in symptoms. Due for updated lipid/cholesterol panel 4/2023. Will review ZIO strips and decide if we should do a repeat 7 day monitor.

## 2023-03-14 ENCOUNTER — TELEPHONE (OUTPATIENT)
Dept: CARDIOLOGY CLINIC | Facility: CLINIC | Age: 73
End: 2023-03-14

## 2023-03-14 NOTE — TELEPHONE ENCOUNTER
Pt pati stating that he is suppose to ask for you per Cam   He has BP readings for you    [phone]

## 2023-03-14 NOTE — TELEPHONE ENCOUNTER
Takes his pills normally around 7 am and 9:30 am      3/10 120/70  3/11 116/72  3/12 145/74- 11:55 am          154/80- 7:55 pm  3/13 124/73 5pm  3/14 142/82 12:40 pm (after shoveling snow)    Pt also mentioned that he was supposed to say something about a monitor? im not sure what he meant

## 2023-03-15 NOTE — TELEPHONE ENCOUNTER
Dr Dominic Clayton wanted to review the strips from the ZIO done at [facility] to see if it should be repeated  Can you get the actual EKG strips from the ZIO done at [facility] for her to review? BP's are mostly acceptable  Let us know if persistently < 140/90  Thank you!

## 2023-05-01 DIAGNOSIS — I10 HYPERTENSION, UNSPECIFIED TYPE: ICD-10-CM

## 2023-05-01 RX ORDER — POTASSIUM CHLORIDE 750 MG/1
CAPSULE, EXTENDED RELEASE ORAL
Qty: 90 CAPSULE | Refills: 3 | Status: SHIPPED | OUTPATIENT
Start: 2023-05-01

## 2023-05-16 DIAGNOSIS — K21.9 GASTROESOPHAGEAL REFLUX DISEASE WITHOUT ESOPHAGITIS: ICD-10-CM

## 2023-05-16 DIAGNOSIS — I10 ESSENTIAL HYPERTENSION: ICD-10-CM

## 2023-05-16 RX ORDER — PANTOPRAZOLE SODIUM 40 MG/1
TABLET, DELAYED RELEASE ORAL
Qty: 90 TABLET | Refills: 3 | Status: SHIPPED | OUTPATIENT
Start: 2023-05-16

## 2023-05-23 ENCOUNTER — NURSE TRIAGE (OUTPATIENT)
Dept: OTHER | Facility: OTHER | Age: 73
End: 2023-05-23

## 2023-05-23 NOTE — TELEPHONE ENCOUNTER
Pt had cystoscopy recently and catheter removed 5/17  Pt having episodic hematuria since then with more bleeding today with moderate clots  Urology has suggested to pt that he may need to alter blood thinner regimen to decrease this bleeding  Pt takes Plavix and Eliquis  Pt requesting input from Cardiology  TT Dr Mario Bolden who will call pt

## 2023-05-23 NOTE — TELEPHONE ENCOUNTER
"Regarding: post op bleeding/ blood thinners  ----- Message from Prabhakar Wilson sent at 5/23/2023  6:19 PM EDT -----  \" I had a procedure through [facility], and once again I am bleeding and have blood clots  Im concerned because im on blood thinners and I wanted to touch base with cardiology for some advice  \"    "

## 2023-05-23 NOTE — TELEPHONE ENCOUNTER
"  Reason for Disposition  • Taking Coumadin (warfarin) or other strong blood thinner, or known bleeding disorder (e g , thrombocytopenia)    Answer Assessment - Initial Assessment Questions  1  COLOR of URINE: \"Describe the color of the urine  \"  (e g , tea-colored, pink, red, blood clots, bloody)      Has been intermittently clear yellow with episodes of bloody with clots  Today much more \"purple looking\"   2  ONSET: \"When did the bleeding start? \"       Post op catheter removed 5/17, has been intermittent since then   3  EPISODES: \"How many times has there been blood in the urine? \" or \"How many times today? \"      Today very bloody, almost \"purple\"   4  PAIN with URINATION: \"Is there any pain with passing your urine? \" If Yes, ask: \"How bad is the pain? \"  (Scale 1-10; or mild, moderate, severe)     - MILD - complains slightly about urination hurting     - MODERATE - interferes with normal activities       - SEVERE - excruciating, unwilling or unable to urinate because of the pain       No pain   5  FEVER: \"Do you have a fever? \" If Yes, ask: \"What is your temperature, how was it measured, and when did it start? \"      No fever  6  ASSOCIATED SYMPTOMS: Shannan Khoury you passing urine more frequently than usual?\"      No more frequently  7  OTHER SYMPTOMS: \"Do you have any other symptoms? \" (e g , back/flank pain, abdominal pain, vomiting)      No other symptoms    Protocols used: URINE - BLOOD IN-ADULT-AH    "

## 2023-05-24 ENCOUNTER — TELEPHONE (OUTPATIENT)
Dept: CARDIOLOGY CLINIC | Facility: CLINIC | Age: 73
End: 2023-05-24

## 2023-05-24 NOTE — TELEPHONE ENCOUNTER
Pt's wife LM and said that Michelle Conley had a proced done and he is bleeding and clotting the dr told him to stop the Plavix and Eliquis but before they do that they want to know what Mainor Funes or Ramila say to do    Please call [phone] or [phone]

## 2023-05-24 NOTE — TELEPHONE ENCOUNTER
Patient spoke with Dr Elisabet Pierce evening of 5/23/23  He was instructed to hold his Eliquis through Friday  Continue Plavix  Plan was to continue off Eliquis until Saturday  Call with an update Friday  If still bleeding by Friday will need to continue to hold Eliquis over the weekend and call Monday with an update

## 2023-05-30 ENCOUNTER — TELEPHONE (OUTPATIENT)
Dept: CARDIOLOGY CLINIC | Facility: CLINIC | Age: 73
End: 2023-05-30

## 2023-05-30 NOTE — TELEPHONE ENCOUNTER
I talked to patient and asked him to hold Eliquis until seen by urology tomorrow 5/31/2023  I told him to call me after his visit tomorrow and let me know if they have any recommendations for how long to hold Eliquis  He has been in normal sinus rhythm so I am ok with holding for as long as they feel is necessary but would like to know their recommendations for documentation  Patient will call after his visit with an update  Thank you

## 2023-05-30 NOTE — TELEPHONE ENCOUNTER
Patient called with a self report  After having bladder surgery and having to stop his Eliquis 5 MG TAKE 1 TABLET EVERY 12 HOURS  Patient started his Eliquis 5 MG yesterday and used the bathroom this morning at 3 AM and he had blood then used the bathroom at 6 AM not as much blood but it was darker  Patient drank 5 16 oz bottles of water yesterday in an 8 hour period      Patient can be reached at [phone]

## 2023-05-31 NOTE — TELEPHONE ENCOUNTER
Per pt Ramila wanted to know after he saw the urologist how long to stay off the Eliquis  Per urologist 2 weeks    If you have any questions for Minnie Pod please call him at  [phone]

## 2023-06-01 NOTE — TELEPHONE ENCOUNTER
Can you please let him know that I am aware of the 2 week hold request for Eliquis which is fine  Thank you  Yes - the patient is able to be screened

## 2023-06-08 RX ORDER — MAGNESIUM OXIDE 400 MG/1
400 TABLET ORAL
COMMUNITY

## 2023-06-08 RX ORDER — ASPIRIN 81 MG/1
81 TABLET ORAL
COMMUNITY
End: 2023-06-13 | Stop reason: ALTCHOICE

## 2023-06-08 RX ORDER — PHENAZOPYRIDINE HYDROCHLORIDE 100 MG/1
100 TABLET, FILM COATED ORAL 3 TIMES DAILY
COMMUNITY
Start: 2023-05-10 | End: 2023-06-13 | Stop reason: ALTCHOICE

## 2023-06-13 ENCOUNTER — OFFICE VISIT (OUTPATIENT)
Dept: CARDIOLOGY CLINIC | Facility: CLINIC | Age: 73
End: 2023-06-13
Payer: MEDICARE

## 2023-06-13 VITALS
TEMPERATURE: 97.5 F | HEART RATE: 76 BPM | WEIGHT: 249.6 LBS | DIASTOLIC BLOOD PRESSURE: 82 MMHG | SYSTOLIC BLOOD PRESSURE: 160 MMHG | OXYGEN SATURATION: 97 % | BODY MASS INDEX: 32.03 KG/M2 | HEIGHT: 74 IN

## 2023-06-13 DIAGNOSIS — I34.0 NON-RHEUMATIC MITRAL REGURGITATION: ICD-10-CM

## 2023-06-13 DIAGNOSIS — N40.1 BENIGN PROSTATIC HYPERPLASIA WITH URINARY OBSTRUCTION: ICD-10-CM

## 2023-06-13 DIAGNOSIS — I10 PRIMARY HYPERTENSION: ICD-10-CM

## 2023-06-13 DIAGNOSIS — I48.0 PAF (PAROXYSMAL ATRIAL FIBRILLATION) (HCC): ICD-10-CM

## 2023-06-13 DIAGNOSIS — Z95.1 S/P CABG (CORONARY ARTERY BYPASS GRAFT): ICD-10-CM

## 2023-06-13 DIAGNOSIS — E78.2 MIXED HYPERLIPIDEMIA: ICD-10-CM

## 2023-06-13 DIAGNOSIS — N13.8 BENIGN PROSTATIC HYPERPLASIA WITH URINARY OBSTRUCTION: ICD-10-CM

## 2023-06-13 DIAGNOSIS — E66.9 OBESITY (BMI 30-39.9): ICD-10-CM

## 2023-06-13 DIAGNOSIS — I65.21 CAROTID OCCLUSION, RIGHT: ICD-10-CM

## 2023-06-13 DIAGNOSIS — N18.31 STAGE 3A CHRONIC KIDNEY DISEASE (HCC): ICD-10-CM

## 2023-06-13 DIAGNOSIS — R06.09 DYSPNEA ON EXERTION: ICD-10-CM

## 2023-06-13 DIAGNOSIS — I73.9 PERIPHERAL VASCULAR DISEASE (HCC): ICD-10-CM

## 2023-06-13 DIAGNOSIS — I25.10 CORONARY ARTERY DISEASE INVOLVING NATIVE CORONARY ARTERY OF NATIVE HEART WITHOUT ANGINA PECTORIS: Primary | ICD-10-CM

## 2023-06-13 PROCEDURE — 99214 OFFICE O/P EST MOD 30 MIN: CPT | Performed by: NURSE PRACTITIONER

## 2023-06-13 RX ORDER — IRBESARTAN 300 MG/1
300 TABLET ORAL DAILY
Qty: 30 TABLET | Refills: 11 | Status: SHIPPED | OUTPATIENT
Start: 2023-06-13

## 2023-06-13 NOTE — ASSESSMENT & PLAN NOTE
Coronary artery disease:  A   NSTEMI 10/01/2008  B   2 Taxus DEIRDRE to the RCA 10/01/2008  C  Moderate left main disease on cardiac catheterization 2008, 2012 and 2014  D  Cardiac catheterization 06/29/2016 with FFR less than 0 75 with left main 50% stenotic  E   CABG x 2  LIMA-LAD  SVG-OM 7/29/16  F   Non ischemic Lexiscan nuclear stress test 9/2/2021  G  Non ischemic Lexiscan nuclear stress test 12/28/22 at [facility]  Patient denies any cardiac symptoms at present  He denies chest pain or change in baseline dyspnea on exertion  He is currently on clopidogrel and eliquis  No aspirin  Continue atorvastatin 40 mg daily for goal LDL < 70  ECG 9/9/2022 is normal    Echocardiogram 9/2022 shows preserved EF  We reviewed urgent s/s to report and when to seek immediate medical attention

## 2023-06-13 NOTE — ASSESSMENT & PLAN NOTE
Body mass index is 32 05 kg/m²  Discussed the importance of resuming exercise program and trying to maintain a healthy diet

## 2023-06-13 NOTE — ASSESSMENT & PLAN NOTE
Carotid Doppler 06/05/2017 with known occlusion of the right internal carotid artery  50 to 69% stenosis of the left internal carotid artery  Patient was being followed by vascular surgery at [facility] (Dr Sergio Scott) with last office visit 11/20/19 and updated carotid Doppler done that day  Recent carotid duplex 12/10/2021 which was unchanged per report from patient  Dr Sergio Scott is no longer at the group and he has not seen anyone new as of now  He requests referral to [place] [address] vascular surgery  Will place referral now along with updated carotid duplex  Continue aggressive medical therapy  Discussed signs and symptoms of stroke with the patient

## 2023-06-13 NOTE — PROGRESS NOTES
Cardiology Follow Up    [facility]  1950  [id number]  [facility]  [address]  [address] [facility] [id number] [phone]    Kianna Camilo presents for routine follow up of coronary artery disease, atrial fibrillation, hypertension, hyperlipidemia, mitral regurgitation and carotid artery disease      1  Coronary artery disease involving native coronary artery of native heart without angina pectoris  Assessment & Plan:  Coronary artery disease:  A   NSTEMI 10/01/2008  B   2 Taxus DEIRDRE to the RCA 10/01/2008  C  Moderate left main disease on cardiac catheterization 2008, 2012 and 2014  D  Cardiac catheterization 06/29/2016 with FFR less than 0 75 with left main 50% stenotic  E   CABG x 2  LIMA-LAD  SVG-OM 7/29/16  F   Non ischemic Lexiscan nuclear stress test 9/2/2021  G  Non ischemic Lexiscan nuclear stress test 12/28/22 at [facility]  Patient denies any cardiac symptoms at present  He denies chest pain or change in baseline dyspnea on exertion  He is currently on clopidogrel and eliquis  No aspirin  Continue atorvastatin 40 mg daily for goal LDL < 70  ECG 9/9/2022 is normal    Echocardiogram 9/2022 shows preserved EF  We reviewed urgent s/s to report and when to seek immediate medical attention  2  S/P CABG (coronary artery bypass graft)  Assessment & Plan:  CABG x 2: LIMA-LAD  SVG-OM 7/29/16  [facility]        3  Carotid occlusion, right  Assessment & Plan:  Carotid Doppler 06/05/2017 with known occlusion of the right internal carotid artery  50 to 69% stenosis of the left internal carotid artery  Patient was being followed by vascular surgery at [facility] (Dr Gustavo Casarez) with last office visit 11/20/19 and updated carotid Doppler done that day  Recent carotid duplex 12/10/2021 which was unchanged per report from patient  Dr Gustavo Casarez is no longer at the group and he has not seen anyone new as of now   He requests referral to [facility] vascular surgery  Will place referral now along with updated carotid duplex  Continue aggressive medical therapy  Discussed signs and symptoms of stroke with the patient  Orders:  -     VAS carotid complete study; Future; Expected date: 06/13/2023  -     Ambulatory referral to Vascular Surgery; Future    4  Peripheral vascular disease [facility])  Assessment & Plan:  See discussion under carotid occlusion  5  PAF (paroxysmal atrial fibrillation) ([address] )  Assessment & Plan:  Patient remains in normal sinus rhythm on Sotalol 120 mg twice daily  ECG 9/9/2022 with normal sinus rhythm  14 day ZIO monitoring through [facility] 1/2023 showed short SVT episodes, 10% PAC burden, and 5 short VT runs  Metoprolol succinate 25 mg daily was added at that time by EP at [facility]  Eliquis 5 mg twice daily for anticoagulation has been on hold for 2 weeks due to hematuria secondary to urologic procedure  Patient will be resuming Eliquis tomorrow and will monitor for recurrent bleeding  I did discuss Watchman procedure with patient today, which should be consider if further bleeding issues occur  Patient is meeting with EP at [address] [place] [address] [address] tomorrow and we discussed today consideration of updated cardiac monitoring as he is now on metoprolol  I also discussed with patient today the benefits of a sleep study to evaluate for GAGE  He currently denies symptoms of GAGE and declines testing  6  Non-rheumatic mitral regurgitation  Assessment & Plan:  Echocardiogram 12/22/16 showed mild to moderate central mitral regurgitation  Repeat echocardiogram 02/17/2020 showed mild central mitral regurgitation  Echocardiogram 9/20/2022 showed trace to mild MR       7  Primary hypertension  Assessment & Plan:  Blood pressures remain elevated at OV's  Moderate LVH on echocardiogram   Will transition off losartan to irbesartan 300 mg daily  Continue metoprolol succinate 25 mg daily, sotalol 120 mg BID     Will consider addition of amlodipine as needed  Patient will update office on BP readings next week with current adjustment  BMP in 2 weeks  Reviewed benefits of 2 g sodium diet, avoid caffeine, weight control, regular exercise  Orders:  -     irbesartan (AVAPRO) 300 mg tablet; Take 1 tablet (300 mg total) by mouth daily  -     Basic metabolic panel; Future; Expected date: 06/27/2023    8  Mixed hyperlipidemia  Assessment & Plan:  Lipid panel 4/13/2023: C 114  T 144  H 34  L 51  Atorvastatin 40 mg     Goal LDL < 70  Currently at goal    Repeat lipid panel in 1 year's time  9  Stage 3a chronic kidney disease [facility])  Assessment & Plan:  Lab Results   Component Value Date    CREATININE 1 22 10/24/2022    EGFR 58 10/24/2022     Renal function remains stable on recent blood work  Will monitor with adjustment in ARB  10  Obesity (BMI 30-39  9)  Assessment & Plan: Body mass index is 32 05 kg/m²  Discussed the importance of resuming exercise program and trying to maintain a healthy diet  11  Dyspnea on exertion  Assessment & Plan:  Patient with stable dyspnea on exertion since his last office visit  He did have a non ischemic stress test 1/2023  He appears euvolemic  Echocardiogram 9/2022 with preserved EF  Patient was instructed to call if he notices any change/worsening of symptoms  12  Benign prostatic hyperplasia with urinary obstruction  Assessment & Plan:  Following with [facility] urology  Currently Eliquis is on hold for 2 weeks secondary to hematuria following a cystoscopy with biopsy  HPI   Patient with history of coronary artery disease dating back to 10/2008 when he had DEIRDRE x 2 placed to an occluded right coronary artery  He ultimately underwent CABG x 2 with LIMA-LAD and SVG-OM 7/29/16  He had post operative atrial fibrillation and follows with Dr Carl Garcia at [facility] for EP  He was last seen 6/2019  Sunil Liu is on sotalol   He has known carotid occlusion for which he was following with Dr Humberto Carvalho (vascular surgery)       At 9/2022 OV with Dr Jodie Mark his ECG showed NSR  He was having issues with hematuria and started seeing urology  Plavix was held for 1 week  Updated echocardiogram 9/20/2022 showed EF 60% with moderate LVH, mildly reduced RV function, trace-mild MR, mild TR with mildly elevated pulmonary artery pressure  He followed up most recently with Dr Jodie Mark 3/10/2023  He had undergone right knee replacement 11/2022  He met with [facility] EP in December at which time he reported lightheadedness  ECG showed SR with PAC's  A 14 day cardiac monitor and stress test were ordered  Nuclear stress test 12/28/2022 showed no ischemia with preserved EF  ZIO monitor showed 10 8% PAC burden, 80 episodes of SVT, longest 33 2 seconds, and 5 VT episodes, longest 8 beats  Metoprolol succinate 25 mg daily was added  6/13/2023: Herrera Unger presents today for routine follow up  He has been off Eliquis for 2 weeks and will be resuming tomorrow  He denies any recurrent hematuria  BP remains elevated today, he is not monitoring consistently at home but when he checks it has been in the 964-751'J systolic  He denies any chest pain  He reports stable dyspnea on exertion which he attributes to being out of shape  No edema/fluid retention  He denies palpitations, lightheadedness  He is taking his medications faithfully  He will be meeting with Dr Amber Burton at [facility] tomorrow  He denies any alcohol consumption, drinks 2 cups of coffee daily  He states he wakes 1-3 times per night to void but denies snoring or sleep disruption due to breathing  He has not undergone a sleep study  Lipid panel from 4/2023 shows LDL 51  Medical Problems     Problem List     Carotid occlusion, right    CAD (coronary artery disease)    Hypertension    Hyperlipidemia    Non-rheumatic mitral regurgitation    Obesity (BMI 30-39  9)    PAF (paroxysmal atrial fibrillation) ([facility])    S/P CABG (coronary artery bypass graft)    Dyspnea on exertion Peripheral vascular disease ([facility] [address] )        Past Medical History:   Diagnosis Date   • Atrial fibrillation (HCC)    • Carotid occlusion, right    • Coronary artery disease    • GERD (gastroesophageal reflux disease)    • Hyperlipidemia    • Hypertension    • Myocardial infarct [facility])      Social History     Socioeconomic History   • Marital status: /Civil Union     Spouse name: Not on file   • Number of children: Not on file   • Years of education: Not on file   • Highest education level: Not on file   Occupational History   • Occupation: Retired   Tobacco Use   • Smoking status: Former   • Smokeless tobacco: Never   Vaping Use   • Vaping Use: Never used   Substance and Sexual Activity   • Alcohol use: Yes     Alcohol/week: 1 0 standard drink of alcohol     Types: 1 Cans of beer per week     Comment: rarely   • Drug use: Never   • Sexual activity: Not on file     Comment: Defer   Other Topics Concern   • Not on file   Social History Narrative   • Not on file     Social Determinants of Health     Financial Resource Strain: Not on file   Food Insecurity: Not on file   Transportation Needs: Not on file   Physical Activity: Not on file   Stress: Not on file   Social Connections: Not on file   Intimate Partner Violence: Not on file   Housing Stability: Not on file      Family History   Problem Relation Age of Onset   • Heart disease Mother    • Heart failure Father      Past Surgical History:   Procedure Laterality Date   • CARDIAC CATHETERIZATION  10/01/2008    RCA with 100% subacute occlusion  DEIRDRE x2 to RCA  Ostial left main disease with FFR 0 78     • CARDIAC CATHETERIZATION  03/16/2012    Moderate 50% ostial left main  FFR 0 82  Distal RCA 40%  No intervention  • CARDIAC CATHETERIZATION  11/24/2014    Ostial left main 60%  Ostial left circumflex 70%  • CARDIAC CATHETERIZATION  06/29/2016    Left main 50%  FFR was less than 0 75  CABG recommended      • CARDIOVERSION  07/01/2017   • CEREBRAL ANGIOGRAM 12/12/2013    Carotid angiography  Left internal carotid artery 40%  Right internal carotid artery 100% occlusion  • CHOLECYSTECTOMY     • CORONARY ARTERY BYPASS GRAFT  07/29/2016    LIMA-LAD  SVG-OM      • LUMBAR DISC SURGERY     • TONSILLECTOMY         Current Outpatient Medications:   •  acetaminophen (TYLENOL) 500 mg tablet, Take by mouth, Disp: , Rfl:   •  allopurinol (ZYLOPRIM) 300 mg tablet, Take 300 mg by mouth daily , Disp: , Rfl:   •  atorvastatin (LIPITOR) 40 mg tablet, TAKE 1 TABLET DAILY, Disp: 90 tablet, Rfl: 3  •  Cholecalciferol (VITAMIN D3 HIGH POTENCY PO), Take by mouth, Disp: , Rfl:   •  clopidogrel (Plavix) 75 mg tablet, Take 1 tablet (75 mg total) by mouth daily, Disp: 90 tablet, Rfl: 3  •  Eliquis 5 MG, TAKE 1 TABLET EVERY 12 HOURS, Disp: 180 tablet, Rfl: 3  •  finasteride (PROSCAR) 5 mg tablet, Take by mouth, Disp: , Rfl:   •  irbesartan (AVAPRO) 300 mg tablet, Take 1 tablet (300 mg total) by mouth daily, Disp: 30 tablet, Rfl: 11  •  magnesium oxide (MAG-OX) 400 mg tablet, Take 400 mg by mouth, Disp: , Rfl:   •  metoprolol succinate (TOPROL-XL) 25 mg 24 hr tablet, Take 25 mg by mouth daily, Disp: , Rfl:   •  nitroglycerin (NITROSTAT) 0 4 mg SL tablet, , Disp: , Rfl:   •  pantoprazole (PROTONIX) 40 mg tablet, TAKE 1 TABLET DAILY, Disp: 90 tablet, Rfl: 3  •  potassium chloride (MICRO-K) 10 MEQ CR capsule, TAKE 1 CAPSULE DAILY, Disp: 90 capsule, Rfl: 3  •  sotalol (BETAPACE) 120 mg tablet, Take 1 tablet (120 mg total) by mouth 2 (two) times a day, Disp: 180 tablet, Rfl: 3  •  tamsulosin (FLOMAX) 0 4 mg, Take by mouth, Disp: , Rfl:   •  traMADol (ULTRAM) 50 mg tablet, Take 50 mg by mouth every 6 (six) hours as needed (Patient not taking: Reported on 6/13/2023), Disp: , Rfl:   No Known Allergies    Labs:     Chemistry        Component Value Date/Time    BUN 21 10/24/2022 1315     10/24/2022 1315    CO2 30 10/24/2022 1315    CREATININE 1 22 10/24/2022 1315    K 4 1 10/24/2022 1315 Component Value Date/Time    ALKPHOS 66 10/24/2022 1315    ALT 17 10/24/2022 1315    AST 14 10/24/2022 1315    CALCIUM 8 8 10/24/2022 1315        CardiacTest Results:   Lipid panel 4/13/2023: C 114  T 144  H 34  L 51       ECG 9/9/2022: Normal sinus rhythm  Normal ECG  Echocardiogram 8/25/2022:  EF 55-60%  Mod LVH  Low normal to mildly reduced RV function  Biatrial dilation  42 [facility] MR  Mild TR  Mildly increased PASP  Mildly dilated aortic root  Ascending aorta normal size  Lipid panel 4/5/2022: C 123  T 101  H 41  L 62       Carotid Duplex 12/10/2021: No report available (will try to obtain)       Lexiscan nuclear stress test 9/2/2021:   Fernando protocol attempted  Transitioned to [facility] as heart rate blunted  No evidence of ischemia  Fixed inferior defect in RCA territory  EF 52%       Lipid panel 3/2/2021: C 131  T 142  H 37  L 66       Carotid Duplex 11/23/2020: JAY known 100% occlusion  15-95% LICA stenosis  No change from prior study       Echocardiogram 2/17/2020:  Mildly dilated left ventricle with normal left ventricular systolic function  EF estimated at 60-65%  Mild concentric left ventricular hypertrophy  Abnormal septal motion likely due to known cardiac surgery  Mild diastolic dysfunction  Moderately dilated right ventricle with normal right ventricular systolic function  Mild left and right atrial enlargement  Aortic sclerosis without stenosis  Mitral annular calcification  Mild central mitral regurgitation  Mild tricuspid regurgitation  Estimated PASP 30 mmHg      Cv Stress Nuclear Pharm: Result Date: 5/4/2018  No evidence of ECG changes to suggest ischemia  · The patient reported shortness of breath during the stress test  · Blood pressure demonstrated a hypotensive response to stress   · There is a small to moderate sized non-reversible (scar/infarct) defect of mild severity present in the basal anterior and basal inferior wall(s), mostly normalized with CT correction, "consistent with diaphragmatic attenuation  · Left ventricular perfusion is probably normal  No ischemia · This is a low risk study  Arterial duplex 11/06/2013:  No abdominal aortic aneurysm   No significant aortoiliac disease  Stress echocardiogram 6/02/2016:  Fernando  7:42 seconds   8 3 METs   Hypotension with exercise   66% of maximum predicted heart rate   Frequent PACs and PVCs   Atrial tachycardia   No ischemia at submaximal heart rate      Carotid duplex 06/05/2017:  Occluded right internal carotid artery  50 to 69% left internal carotid artery stenosis    Twelve month follow-up recommended      Echocardiogram 12/22/2016:  Mildly dilated LV   EF 55%   Abnormal septal motion   Mild concentric left ventricular hypertrophy   Mildly enlarged RV with normal RV systolic function   Mild right atrial enlargement/left atrial enlargement   Aortic sclerosis   No AI   MAC   Mild to moderate central MR   Mild TR   Top-normal PASP 39 mmHg  Review of Systems   Constitutional: Negative  HENT: Negative  Cardiovascular: Positive for dyspnea on exertion  Negative for chest pain, irregular heartbeat, leg swelling, near-syncope, orthopnea and palpitations  Respiratory: Negative for cough and snoring  Endocrine: Negative  Skin: Negative  Musculoskeletal: Negative  Gastrointestinal: Negative  Genitourinary: Positive for nocturia  Neurological: Negative  Psychiatric/Behavioral: Negative  Vitals:    06/13/23 0805   BP: 160/82   Pulse: 76   Temp: 97 5 °F (36 4 °C)   SpO2: 97%     Vitals:    06/13/23 0805   Weight: 113 kg (249 lb 9 6 oz)     Height: 6' 2\" (188 cm)   Body mass index is 32 05 kg/m²  Physical Exam  Vitals and nursing note reviewed  Constitutional:       General: He is not in acute distress  Appearance: He is well-developed  He is obese  He is not diaphoretic  HENT:      Head: Normocephalic and atraumatic  Neck:      Vascular: No JVD     Cardiovascular:      Rate and " Rhythm: Normal rate and regular rhythm  Frequent Extrasystoles are present  Pulses: Intact distal pulses  Heart sounds: Normal heart sounds, S1 normal and S2 normal  No murmur heard  No friction rub  No gallop  Comments: No lower leg edema  Pulmonary:      Effort: Pulmonary effort is normal  No respiratory distress  Breath sounds: Normal breath sounds  Abdominal:      General: There is no distension  Palpations: Abdomen is soft  Tenderness: There is no abdominal tenderness  Skin:     General: Skin is warm and dry  Findings: No rash  Neurological:      Mental Status: He is alert and oriented to person, place, and time  Psychiatric:         Mood and Affect: Mood and affect normal          Speech: Speech normal          Behavior: Behavior normal  Behavior is cooperative           Cognition and Memory: Cognition and memory normal

## 2023-06-13 NOTE — ASSESSMENT & PLAN NOTE
Blood pressures remain elevated at OV's  Moderate LVH on echocardiogram   Will transition off losartan to irbesartan 300 mg daily  Continue metoprolol succinate 25 mg daily, sotalol 120 mg BID  Will consider addition of amlodipine as needed  Patient will update office on BP readings next week with current adjustment  BMP in 2 weeks  Reviewed benefits of 2 g sodium diet, avoid caffeine, weight control, regular exercise

## 2023-06-13 NOTE — ASSESSMENT & PLAN NOTE
Lab Results   Component Value Date    CREATININE 1 22 10/24/2022    EGFR 58 10/24/2022     Renal function remains stable on recent blood work  Will monitor with adjustment in ARB

## 2023-06-13 NOTE — ASSESSMENT & PLAN NOTE
Lipid panel 4/13/2023: C 114  T 144  H 34  L 51  Atorvastatin 40 mg     Goal LDL < 70  Currently at goal    Repeat lipid panel in 1 year's time

## 2023-06-13 NOTE — PATIENT INSTRUCTIONS
Due to persistently elevated blood pressure we will transition you off losartan to irbesartan 300 mg daily  Monitor your blood pressure once daily at least 2 hours after taking your irbesartan  Call the office next week with an update on your blood pressures  We may need to add an additional medication if BP stays > 140/90  BMP blood test (nonfasting) in 2 weeks  Discuss possible updated cardiac monitor with Dr Tab Mora tomorrow as you are now on metoprolol  Consider sleep study if you notice poor sleep qualify  I gave you a pamphlet for the Watchman procedure  We may need to consider if you continue with bleeding issues from your urologic conditions

## 2023-06-13 NOTE — ASSESSMENT & PLAN NOTE
Following with [facility] urology  Currently Eliquis is on hold for 2 weeks secondary to hematuria following a cystoscopy with biopsy

## 2023-06-13 NOTE — ASSESSMENT & PLAN NOTE
Echocardiogram 12/22/16 showed mild to moderate central mitral regurgitation  Repeat echocardiogram 02/17/2020 showed mild central mitral regurgitation  Echocardiogram 9/20/2022 showed trace to mild MR

## 2023-06-13 NOTE — ASSESSMENT & PLAN NOTE
Patient with stable dyspnea on exertion since his last office visit  He did have a non ischemic stress test 1/2023  He appears euvolemic  Echocardiogram 9/2022 with preserved EF  Patient was instructed to call if he notices any change/worsening of symptoms

## 2023-06-13 NOTE — ASSESSMENT & PLAN NOTE
Patient remains in normal sinus rhythm on Sotalol 120 mg twice daily  ECG 9/9/2022 with normal sinus rhythm  14 day ZIO monitoring through [facility] 1/2023 showed short SVT episodes, 10% PAC burden, and 5 short VT runs  Metoprolol succinate 25 mg daily was added at that time by EP at [facility]  Eliquis 5 mg twice daily for anticoagulation has been on hold for 2 weeks due to hematuria secondary to urologic procedure  Patient will be resuming Eliquis tomorrow and will monitor for recurrent bleeding  I did discuss Watchman procedure with patient today, which should be consider if further bleeding issues occur  Patient is meeting with EP at [facility] tomorrow and we discussed today consideration of updated cardiac monitoring as he is now on metoprolol  I also discussed with patient today the benefits of a sleep study to evaluate for GAGE  He currently denies symptoms of GAGE and declines testing

## 2023-06-16 ENCOUNTER — TELEPHONE (OUTPATIENT)
Dept: CARDIOLOGY CLINIC | Facility: CLINIC | Age: 73
End: 2023-06-16

## 2023-06-16 NOTE — TELEPHONE ENCOUNTER
Called patient to get more information  Patient went back on Eliquis and 5 oclock yesterday past a blood clot and urine was bloody, 6 olock was deep yellow and by 7 it was clear  This am 10:30 little bit of blood and by 2:00 was clear  Patient just wanted to let you know does not want to stop Eliquis   ROSARIO

## 2023-06-16 NOTE — TELEPHONE ENCOUNTER
Pt pati stating that he would like to speak to [facility] he has a question to ask the dr in reference to his appt the other day   [phone]

## 2023-06-19 DIAGNOSIS — I25.10 CORONARY ARTERY DISEASE INVOLVING NATIVE CORONARY ARTERY OF NATIVE HEART WITHOUT ANGINA PECTORIS: ICD-10-CM

## 2023-06-19 RX ORDER — CLOPIDOGREL BISULFATE 75 MG/1
TABLET ORAL
Qty: 90 TABLET | Refills: 3 | Status: SHIPPED | OUTPATIENT
Start: 2023-06-19

## 2023-06-19 NOTE — TELEPHONE ENCOUNTER
Wife called stating that she has pts cell phone and is unsure how he has been doing  She will have him call later

## 2023-06-19 NOTE — TELEPHONE ENCOUNTER
Patient called back stating that he is doing much better and has no bleeding    Patient can be reached [phone]

## 2023-06-28 ENCOUNTER — TELEPHONE (OUTPATIENT)
Dept: CARDIOLOGY CLINIC | Facility: CLINIC | Age: 73
End: 2023-06-28

## 2023-06-28 NOTE — TELEPHONE ENCOUNTER
Ok to continue current regimen, but if continued blood in urine he needs to let urology know  Contact us if BP persistently > 140 or < 100  Thank you!

## 2023-06-28 NOTE — TELEPHONE ENCOUNTER
BP Readings  Late morning graeme afternoon    [phone]      6/15   159/84    6/16   115/69    6/17   134/67    6/18   105/63    6/19   141/78    6/20   139/72    6/21   109/66    6/22   126/56    6/23  165/77    6/24   98/60??    6/25   116/63    6/26   116/64    6/27   128/70        He saw blood in his urine on the 21st   Just once it is all cleared up now

## 2023-06-29 NOTE — TELEPHONE ENCOUNTER
Please let him know I will watch for the carotid ultrasound when he completes it and will reach out to vascular for their guidance on follow up when results are available

## 2023-07-03 ENCOUNTER — TELEPHONE (OUTPATIENT)
Dept: CARDIOLOGY CLINIC | Facility: CLINIC | Age: 73
End: 2023-07-03

## 2023-07-03 NOTE — TELEPHONE ENCOUNTER
----- Message from [address] Norman Townsend sent at 7/3/2023  1:43 PM EDT -----  Please let Mellissa Stevens know his blood work shows stable kidney function and electrolytes. Thank you!

## 2023-07-12 ENCOUNTER — TELEPHONE (OUTPATIENT)
Dept: CARDIOLOGY CLINIC | Facility: CLINIC | Age: 73
End: 2023-07-12

## 2023-07-12 NOTE — TELEPHONE ENCOUNTER
Pt is sched for a endo he needs to stop his Plavix and or Eliquis he just wants to confirm that that's what he needs to do. please call pt [phone]

## 2023-07-12 NOTE — TELEPHONE ENCOUNTER
He may hold his Eliquis for 2 days. He should not hold Plavix. If they are absolutely requiring a Plavix hold he would need to take aspirin 81 mg daily in its place.   Thank you

## 2023-07-20 ENCOUNTER — HOSPITAL ENCOUNTER (OUTPATIENT)
Dept: NON INVASIVE DIAGNOSTICS | Facility: HOSPITAL | Age: 73
Discharge: HOME | End: 2023-07-20
Payer: MEDICARE

## 2023-07-20 DIAGNOSIS — I65.21 CAROTID OCCLUSION, RIGHT: ICD-10-CM

## 2023-07-20 PROCEDURE — 93880 EXTRACRANIAL BILAT STUDY: CPT | Performed by: SURGERY

## 2023-07-20 PROCEDURE — 93880 EXTRACRANIAL BILAT STUDY: CPT

## 2023-07-21 ENCOUNTER — TELEPHONE (OUTPATIENT)
Dept: CARDIOLOGY CLINIC | Facility: CLINIC | Age: 73
End: 2023-07-21

## 2023-07-21 NOTE — TELEPHONE ENCOUNTER
----- Message from Jen Townsend sent at 7/20/2023  5:05 PM EDT -----  Please let [facility] know that his carotid duplex shows the known occlusion on the right and mild stenosis of the internal left. Vascular will review in detail at his appt with them in September. Thank you!

## 2023-09-05 NOTE — ASSESSMENT & PLAN NOTE
77-year-old male with PMH of CAD s/p CABG, HTN, PAF (on Eliquis), HLD, CKD, right ICA occlusion. He presents today for consultation regarding right carotid artery occlusion. -CV duplex 7/20/2023 demonstrates known right ICA occlusion with less than 50% contralateral disease (velocity 133/42). Vertebral flow is antegrade bilaterally. -Asymptomatic. Denies TIA/CVA symptoms. No prior hx of CVA. Plan:  -Discussed the pathophysiology of carotid stenosis, available treatment options, and indications for intervention.   -No surgical intervention recommended for known right ICA occlusion.  -Obtain updated carotid artery duplex in 1 year to monitor left ICA stenosis. -Continue plavix (CAD) and statin.   -On Eliquis for A-fib. Management per cardiology.  -Educated on s/sx of worsening condition, including TIA/Stroke-like symptoms, and to call 911 or go to the ED if symptoms occur.

## 2023-09-06 ENCOUNTER — CONSULT (OUTPATIENT)
Dept: VASCULAR SURGERY | Facility: HOSPITAL | Age: 73
End: 2023-09-06
Payer: MEDICARE

## 2023-09-06 VITALS
OXYGEN SATURATION: 96 % | HEIGHT: 74 IN | SYSTOLIC BLOOD PRESSURE: 110 MMHG | TEMPERATURE: 98.4 F | WEIGHT: 253 LBS | HEART RATE: 84 BPM | BODY MASS INDEX: 32.47 KG/M2 | DIASTOLIC BLOOD PRESSURE: 62 MMHG

## 2023-09-06 DIAGNOSIS — I65.21 CAROTID OCCLUSION, RIGHT: Primary | ICD-10-CM

## 2023-09-06 DIAGNOSIS — E78.2 MIXED HYPERLIPIDEMIA: ICD-10-CM

## 2023-09-06 DIAGNOSIS — I10 PRIMARY HYPERTENSION: ICD-10-CM

## 2023-09-06 PROCEDURE — 99203 OFFICE O/P NEW LOW 30 MIN: CPT

## 2023-09-06 NOTE — ASSESSMENT & PLAN NOTE
-Stable   -Encourage low cholesterol, low fat diet  -Continue with statin therapy  -Medical Management per cardiology

## 2023-09-06 NOTE — PATIENT INSTRUCTIONS
-Discussed the pathophysiology of carotid stenosis, available treatment options, and indications for intervention.   -No surgical intervention recommended for carotid occlusion. No surgical intervention recommended at this time for mild left carotid disease.  -Obtain updated carotid artery duplex in 1 year to re-evaluate left carotid artery stenosis for progression of disease.   -Continue medical optimization with plavix and statin.   -Continue eliquis for afib. -Recommend low-fat, low-cholesterol diet and regular exercise.   -Educated on s/sx of worsening condition, including TIA/Stroke-like symptoms, and to call 911 or go to the ED if symptoms occur.   -Follow up in 1 year. Carotid Artery Disease   AMBULATORY CARE:   Carotid artery disease (CAD)  means the major blood vessels in your neck are narrowed or becoming blocked. These 2 major blood vessels are called the carotid arteries. They supply your brain with blood. The narrow or blocked blood vessels increase your risk for a stroke. CAD is also called carotid artery stenosis. Have someone call your local emergency number (911 in the [address]) if:   You have any of the following signs of a stroke:      Numbness or drooping on one side of your face     Weakness in an arm or leg    Confusion or difficulty speaking    Dizziness, a severe headache, or vision loss    You have any of the following signs of a heart attack:      Squeezing, pressure, or pain in your chest    You may  also have any of the following:     Discomfort or pain in your back, neck, jaw, stomach, or arm    Shortness of breath    Nausea or vomiting    Lightheadedness or a sudden cold sweat    Call your doctor if:   You have questions or concerns about your condition or care. Common signs and symptoms:  CAD develops slowly. You may have no signs or symptoms until you have a mini-stroke, or transient ischemic attack (TIA). A TIA is a temporary lack of blood flow to your brain.  A TIA goes away quickly and does not cause permanent damage. A TIA may be a warning sign that you are about to have a stroke. If you have any symptoms of a TIA or stroke, seek care immediately. Warning signs of a stroke: The words BE FAST can help you remember and recognize warning signs of a stroke:  B = Balance:  Sudden loss of balance    E = Eyes:  Loss of vision in one or both eyes    F = Face:  Face droops on one side    A = Arms:  Arm drops when both arms are raised    S = Speech:  Speech is slurred or sounds different    T = Time:  Time to get help immediately       Treatment  depends on how narrow your arteries have become. Treatment also depends on your symptoms and your general health. The goal of treatment is to lower your risk for a stroke. You may need any of the following:  Medicines:      Aspirin,  or other blood thinner, may be recommended. These will help prevent blood clots from forming in your carotid arteries. If your healthcare provider wants you to take aspirin, do not take acetaminophen or ibuprofen instead. Cholesterol medicine  lowers your cholesterol level. Blood pressure medicine  lowers or helps control your blood pressure. Procedures  can help open blocked arteries:     Carotid endarterectomy (CEA)  is used to cut and remove plaque buildup from your arteries. Carotid angioplasty and stenting (ALVARO)  is used to push the plaque against the artery wall with a balloon device. Then, a stent is placed to keep the artery open. A stent is a small metal mesh tube. Prevent a stroke:   Do not smoke, and avoid secondhand smoke. Nicotine and other chemicals in cigarettes and cigars increase your risk for a stroke. Ask your healthcare provider for information if you currently smoke and need help to quit. E-cigarettes or smokeless tobacco still contain nicotine. Talk to your healthcare provider before you use these products. Eat a variety of healthy foods.   Healthy foods include fruit, vegetables, whole-grain breads, low-fat dairy products, chicken, and fish. Choose fish that are high in omega-3 fatty acids, such as salmon and fresh tuna. Ask your healthcare provider for more information on a heart healthy diet and the DASH eating plan. Limit sodium (salt). Sodium may increase your blood pressure. Add less table salt to your foods. Read food labels and choose foods that are low in sodium. Your healthcare provider may suggest you follow a low sodium diet. Reach or maintain a healthy weight. Extra weight makes your heart work harder. Ask your healthcare provider what a healthy weight is for you. He or she can help you create a safe weight loss plan. Even a weight loss of 10% of your extra body weight can help your heart function better. Exercise regularly. Exercise helps improve heart function and can help you manage your weight. Exercise can also help lower your cholesterol and blood sugar levels. Try to get at least 30 minutes of exercise 5 times each week. Try to be physically active every day. This may include walking, riding a bicycle, or swimming. Your healthcare provider can help you create an exercise plan that works best for you. Limit alcohol. Alcohol can increase your blood pressure and triglyceride levels. A drink of alcohol is 12 ounces of beer, 5 ounces of wine, or 1½ ounces of liquor. Follow up with your doctor as directed:  Write down your questions so you remember to ask them during your visits. © Copyright [employer] 2022 Information is for End User's use only and may not be sold, redistributed or otherwise used for commercial purposes. The above information is an  only. It is not intended as medical advice for individual conditions or treatments. Talk to your doctor, nurse or pharmacist before following any medical regimen to see if it is safe and effective for you.

## 2023-09-06 NOTE — PROGRESS NOTES
Assessment/Plan:    Carotid occlusion, right  49-year-old male with PMH of CAD s/p CABG, HTN, PAF (on Eliquis), HLD, CKD, right ICA occlusion. He presents today for consultation regarding right carotid artery occlusion. -CV duplex 7/20/2023 demonstrates known right ICA occlusion with less than 50% contralateral disease (velocity 133/42). Vertebral flow is antegrade bilaterally. -Asymptomatic. Denies TIA/CVA symptoms. No prior hx of CVA. Plan:  -Discussed the pathophysiology of carotid stenosis, available treatment options, and indications for intervention.   -No surgical intervention recommended for known right ICA occlusion.  -Obtain updated carotid artery duplex in 1 year to monitor left ICA stenosis. -Continue plavix (CAD) and statin.   -On Eliquis for A-fib. Management per cardiology.  -Educated on s/sx of worsening condition, including TIA/Stroke-like symptoms, and to call 911 or go to the ED if symptoms occur. Hyperlipidemia  -Stable   -Encourage low cholesterol, low fat diet  -Continue with statin therapy  -Medical Management per cardiology      Hypertension  -BP stable in office today   -Continue adequate BP control and monitoring   -Medical management per cardiology         Diagnoses and all orders for this visit:    Carotid occlusion, right  -     VAS carotid complete study; Future  -     Ambulatory referral to Vascular Surgery    Primary hypertension    Mixed hyperlipidemia          Subjective:      Patient ID: Gael Espinoza is a 74 y.o. male. New patient referred by Bev Akers for carotid occlusion right had CV done 7/20/23. Pt denies TIA/stoke like symptoms. Pt is taking Atorvastatin, Plavix and Eliquis. Pt is a former smoker. ANAHY Jones is a 49-year-old male with PMH of CAD s/p CABG, HTN, PAF (on Eliquis), HLD, CKD, right ICA occlusion. He presents today for consultation regarding right carotid artery occlusion.  He states was previously followed by [facility], Dr. Su Dubois for his carotid artery stenosis. He was last seen in 2019 for this. He reports known history of right ICA occlusion. Denies any vascular surgical history. He denies any history of TIA or stroke. He denies recent amaurosis fugax, garbled speech, facial drooping, or lateralizing symptoms. He wears corrective lenses and occasionally sees an eye doctor. Reports a history of CAD and has had heart stents and a CABG. He follows closely with cardiology. He has a hx of HTN and monitors his BP at home. He states when he is at rest his BP will run around 120 SBP and elevate to 130-140 with activity. He is on Plavix and Eliquis. He is also on atorvastatin 40 mg. He is a former smoker. The following portions of the patient's history were reviewed and updated as appropriate: allergies, current medications, past family history, past medical history, past social history, past surgical history and problem list.    Review of Systems   Constitutional: Negative. HENT: Negative. Eyes: Negative. Respiratory: Negative. Cardiovascular: Negative. Gastrointestinal: Negative. Endocrine: Negative. Genitourinary: Negative. Musculoskeletal: Negative. Skin: Negative. Allergic/Immunologic: Negative. Neurological: Negative. Hematological: Bruises/bleeds easily. Psychiatric/Behavioral: Negative. I have personally reviewed and made appropriate changes to the ROS that was input by the medical assistant.        Objective:      Vitals:    09/06/23 0846 09/06/23 0847   BP: 128/68 110/62   BP Location: Left arm Right arm   Patient Position: Sitting Sitting   Cuff Size: Standard Standard   Pulse: 84    Temp: 98.4 °F (36.9 °C)    TempSrc: Tympanic    SpO2: 96%    Weight: 115 kg (253 lb)    Height: 6' 2" (1.88 m)        Patient Active Problem List   Diagnosis   • Carotid occlusion, right   • CAD (coronary artery disease)   • Hypertension   • Hyperlipidemia   • Non-rheumatic mitral regurgitation • Obesity (BMI 30-39. 9)   • PAF (paroxysmal atrial fibrillation) ([facility])   • S/P CABG (coronary artery bypass graft)   • Dyspnea on exertion   • Peripheral vascular disease ([facility])   • Stage 3a chronic kidney disease ([facility])   • Benign prostatic hyperplasia with lower urinary tract symptoms       Past Surgical History:   Procedure Laterality Date   • CARDIAC CATHETERIZATION  10/01/2008    RCA with 100% subacute occlusion. DEIRDRE x2 to RCA. Ostial left main disease with FFR 0.78.    • CARDIAC CATHETERIZATION  03/16/2012    Moderate 50% ostial left main. FFR 0.82. Distal RCA 40%. No intervention. • CARDIAC CATHETERIZATION  11/24/2014    Ostial left main 60%. Ostial left circumflex 70%. • CARDIAC CATHETERIZATION  06/29/2016    Left main 50%. FFR was less than 0.75. CABG recommended. • CARDIOVERSION  07/01/2017   • CEREBRAL ANGIOGRAM  12/12/2013    Carotid angiography. Left internal carotid artery 40%. Right internal carotid artery 100% occlusion. • CHOLECYSTECTOMY     • CORONARY ARTERY BYPASS GRAFT  07/29/2016    LIMA-LAD. SVG-OM. • LUMBAR DISC SURGERY     • TONSILLECTOMY         Family History   Problem Relation Age of Onset   • Heart disease Mother    • Heart failure Father        Social History     Socioeconomic History   • Marital status: /Civil Union     Spouse name: Not on file   • Number of children: Not on file   • Years of education: Not on file   • Highest education level: Not on file   Occupational History   • Occupation: Retired   Tobacco Use   • Smoking status: Former   • Smokeless tobacco: Never   Vaping Use   • Vaping Use: Never used   Substance and Sexual Activity   • Alcohol use:  Yes     Alcohol/week: 1.0 standard drink of alcohol     Types: 1 Cans of beer per week     Comment: rarely   • Drug use: Never   • Sexual activity: Not on file     Comment: Defer   Other Topics Concern   • Not on file   Social History Narrative   • Not on file     Social Determinants of Health     Financial Resource Strain: Not on file   Food Insecurity: Not on file   Transportation Needs: Not on file   Physical Activity: Not on file   Stress: Not on file   Social Connections: Not on file   Intimate Partner Violence: Not on file   Housing Stability: Not on file       No Known Allergies      Current Outpatient Medications:   •  acetaminophen (TYLENOL) 500 mg tablet, Take by mouth, Disp: , Rfl:   •  allopurinol (ZYLOPRIM) 300 mg tablet, Take 300 mg by mouth daily , Disp: , Rfl:   •  atorvastatin (LIPITOR) 40 mg tablet, TAKE 1 TABLET DAILY, Disp: 90 tablet, Rfl: 3  •  Cholecalciferol (VITAMIN D3 HIGH POTENCY PO), Take by mouth, Disp: , Rfl:   •  clopidogrel (PLAVIX) 75 mg tablet, TAKE 1 TABLET DAILY, Disp: 90 tablet, Rfl: 3  •  Eliquis 5 MG, TAKE 1 TABLET EVERY 12 HOURS, Disp: 180 tablet, Rfl: 3  •  finasteride (PROSCAR) 5 mg tablet, Take by mouth, Disp: , Rfl:   •  irbesartan (AVAPRO) 300 mg tablet, Take 1 tablet (300 mg total) by mouth daily, Disp: 30 tablet, Rfl: 11  •  magnesium oxide (MAG-OX) 400 mg tablet, Take 400 mg by mouth, Disp: , Rfl:   •  metoprolol succinate (TOPROL-XL) 25 mg 24 hr tablet, Take 25 mg by mouth daily, Disp: , Rfl:   •  nitroglycerin (NITROSTAT) 0.4 mg SL tablet, , Disp: , Rfl:   •  pantoprazole (PROTONIX) 40 mg tablet, TAKE 1 TABLET DAILY, Disp: 90 tablet, Rfl: 3  •  potassium chloride (MICRO-K) 10 MEQ CR capsule, TAKE 1 CAPSULE DAILY, Disp: 90 capsule, Rfl: 3  •  sotalol (BETAPACE) 120 mg tablet, Take 1 tablet (120 mg total) by mouth 2 (two) times a day, Disp: 180 tablet, Rfl: 3  •  tamsulosin (FLOMAX) 0.4 mg, Take by mouth, Disp: , Rfl:   •  traMADol (ULTRAM) 50 mg tablet, Take 50 mg by mouth every 6 (six) hours as needed (Patient not taking: Reported on 6/13/2023), Disp: , Rfl:       /62 (BP Location: Right arm, Patient Position: Sitting, Cuff Size: Standard)   Pulse 84   Temp 98.4 °F (36.9 °C) (Tympanic)   Ht 6' 2" (1.88 m)   Wt 115 kg (253 lb)   SpO2 96%   BMI 32.48 kg/m² Physical Exam  Vitals and nursing note reviewed. Constitutional:       Appearance: Normal appearance. He is obese. HENT:      Head: Normocephalic and atraumatic. Neck:      Vascular: No carotid bruit. Cardiovascular:      Rate and Rhythm: Normal rate and regular rhythm. Pulses:           Carotid pulses are 2+ on the right side and 2+ on the left side. Radial pulses are 2+ on the right side and 2+ on the left side. Posterior tibial pulses are 2+ on the right side and 2+ on the left side. Heart sounds: Normal heart sounds. Pulmonary:      Effort: Pulmonary effort is normal. No respiratory distress. Breath sounds: Normal breath sounds. Abdominal:      General: Bowel sounds are normal. There is no distension. Palpations: Abdomen is soft. Comments: No abdominal bruit or pulsatile masses. Musculoskeletal:         General: No swelling or tenderness. Normal range of motion. Cervical back: Normal range of motion and neck supple. Right lower leg: No edema. Left lower leg: No edema. Skin:     General: Skin is warm. Capillary Refill: Capillary refill takes less than 2 seconds. Findings: No erythema. Comments: Multiple scattered spider veins in the medial bilateral ankle region   Neurological:      General: No focal deficit present. Mental Status: He is alert and oriented to person, place, and time.       Comments: Speech clear, smile symmetric, 5/5 UE and LE strength   Psychiatric:         Mood and Affect: Mood normal.         Behavior: Behavior normal.       Freya Winchester PA-C  [facility]  [phone]    I have spent a total time of 20 minutes on 09/06/23 in caring for this patient including Diagnostic results, Prognosis, Risks and benefits of tx options, Instructions for management, Patient and family education, Importance of tx compliance, Risk factor reductions, Impressions, Counseling / Coordination of care, Documenting in the medical record, Reviewing / ordering tests, medicine, procedures   and Obtaining or reviewing history  . This text is generated with voice recognition software. There may be translation, syntax, or grammatical errors. If have any questions, please contact the dictating provider.

## 2023-09-06 NOTE — ASSESSMENT & PLAN NOTE
-BP stable in office today   -Continue adequate BP control and monitoring   -Medical management per cardiology

## 2023-09-20 ENCOUNTER — TELEPHONE (OUTPATIENT)
Dept: CARDIOLOGY CLINIC | Facility: CLINIC | Age: 73
End: 2023-09-20

## 2023-09-20 NOTE — TELEPHONE ENCOUNTER
Pt was just asking what the next steps are for scheduling. He is aware that front staff will be calling to reschedule soon.

## 2023-10-07 DIAGNOSIS — I48.0 PAF (PAROXYSMAL ATRIAL FIBRILLATION) (HCC): ICD-10-CM

## 2023-10-09 RX ORDER — SOTALOL HYDROCHLORIDE 120 MG/1
120 TABLET ORAL 2 TIMES DAILY
Qty: 180 TABLET | Refills: 3 | Status: SHIPPED | OUTPATIENT
Start: 2023-10-09

## 2023-10-25 NOTE — PROGRESS NOTES
Cardiology             Archie Wolfe  1950  [id number]              Assessment/Plan:      CAD status post PCI/DEIRDRE to RCA 10/2008, status post CABG x2, LIMA to LAD and SVG to OM 7/29/2016  Right carotid artery occlusion  Paroxysmal atrial fibrillation, on sotalol suppression, Eliquis anticoagulation  History of hematuria while on Eliquis/clopidogrel  Hypertension  Dyslipidemia  Right internal carotid artery occlusion        Patient with no symptoms of angina. He has been on clopidogrel and Eliquis for CAD/carotid disease/paroxysmal atrial fibrillation. We will continue this for now. Look out for any hematuria and if recurrent, may consider discontinuation of clopidogrel. Blood pressure elevated today's visit. He states home readings are typically in the 140s to 160s. We will start amlodipine 5 mg p.o. daily for better blood pressure control. He will call if he experiences any adverse drug reactions that were discussed with him. Continue metoprolol and irbesartan  Prior labs reviewed, stable. Lipid panel well-controlled. Continue sotalol 120 mg p.o. twice daily along with potassium supplement 10 mEq daily. QTc interval normal today at 460 ms. Follow-up in 6 months at [facility]. Patient will follow-up over the phone about his blood pressure readings at home after amlodipine started today. Interval History: This is a 78-year-old male with CAD status post NSTEMI 10/1/2008 PCI/DEIRDRE x2 to the RCA 10/1/2008. There is moderate left main disease on coronary angiography in 2008, 2012, and 2014. He underwent CAD/CABG x2 with LIMA to LAD and SVG to OM on 7/29/2016. He also has a history of right carotid artery occlusion, paroxysmal atrial fibrillation (on sotalol suppression/Eliquis anticoagulation) with hematuria in the past.  He also has hypertension and dyslipidemia. He was last seen by our nurse practitioner in the office 6/13/2023, at which time he was doing well.   Watchman procedure was considered for him if he has any recurrent hematuria. In the meantime he was maintained on Eliquis and metoprolol. He remained on clopidogrel with Eliquis as an antithrombotic strategy. Prior echocardiogram 8/25/2022 demonstrated left ejection fraction 55 to 60% with mild mitral and tricuspid regurgitation. The aortic root was mildly dilated. He presents today to establish cardiovascular care with me. From a symptomatic standpoint he feels very well without exertional chest pain, shortness of breath, dizziness, palpitations, lower extremity edema. He states home blood pressure readings are typically in the 140s to 160s. Vitals:  Vitals:    10/31/23 0949   BP: 170/92   BP Location: Left arm   Patient Position: Sitting   Cuff Size: Standard   Pulse: 78   Temp: (!) 97.3 °F (36.3 °C)   SpO2: 97%   Weight: 117 kg (257 lb 12.8 oz)   Height: 6' 2" (1.88 m)         Past Medical History:   Diagnosis Date   • Atrial fibrillation ([facility])    • Carotid occlusion, right    • Coronary artery disease    • GERD (gastroesophageal reflux disease)    • Hyperlipidemia    • Hypertension    • Myocardial infarct [facility])      Social History     Socioeconomic History   • Marital status: /Civil Union     Spouse name: Not on file   • Number of children: Not on file   • Years of education: Not on file   • Highest education level: Not on file   Occupational History   • Occupation: Retired   Tobacco Use   • Smoking status: Former   • Smokeless tobacco: Never   Vaping Use   • Vaping Use: Never used   Substance and Sexual Activity   • Alcohol use:  Yes     Alcohol/week: 1.0 standard drink of alcohol     Types: 1 Cans of beer per week     Comment: rarely   • Drug use: Never   • Sexual activity: Not on file     Comment: Defer   Other Topics Concern   • Not on file   Social History Narrative   • Not on file     Social Determinants of Health     Financial Resource Strain: Not on file   Food Insecurity: Not on file   Transportation Needs: Not on file   Physical Activity: Not on file   Stress: Not on file   Social Connections: Not on file   Intimate Partner Violence: Not on file   Housing Stability: Not on file      Family History   Problem Relation Age of Onset   • Heart disease Mother    • Heart failure Father      Past Surgical History:   Procedure Laterality Date   • CARDIAC CATHETERIZATION  10/01/2008    RCA with 100% subacute occlusion. DEIRDRE x2 to RCA. Ostial left main disease with FFR 0.78.    • CARDIAC CATHETERIZATION  03/16/2012    Moderate 50% ostial left main. FFR 0.82. Distal RCA 40%. No intervention. • CARDIAC CATHETERIZATION  11/24/2014    Ostial left main 60%. Ostial left circumflex 70%. • CARDIAC CATHETERIZATION  06/29/2016    Left main 50%. FFR was less than 0.75. CABG recommended. • CARDIOVERSION  07/01/2017   • CEREBRAL ANGIOGRAM  12/12/2013    Carotid angiography. Left internal carotid artery 40%. Right internal carotid artery 100% occlusion. • CHOLECYSTECTOMY     • CORONARY ARTERY BYPASS GRAFT  07/29/2016    LIMA-LAD. SVG-OM.     • LUMBAR DISC SURGERY     • TONSILLECTOMY         Current Outpatient Medications:   •  allopurinol (ZYLOPRIM) 300 mg tablet, Take 300 mg by mouth daily , Disp: , Rfl:   •  amLODIPine (NORVASC) 5 mg tablet, Take 1 tablet (5 mg total) by mouth daily, Disp: 90 tablet, Rfl: 3  •  atorvastatin (LIPITOR) 40 mg tablet, TAKE 1 TABLET DAILY, Disp: 90 tablet, Rfl: 3  •  Cholecalciferol (VITAMIN D3 HIGH POTENCY PO), Take by mouth, Disp: , Rfl:   •  clopidogrel (PLAVIX) 75 mg tablet, TAKE 1 TABLET DAILY, Disp: 90 tablet, Rfl: 3  •  Eliquis 5 MG, TAKE 1 TABLET EVERY 12 HOURS, Disp: 180 tablet, Rfl: 3  •  finasteride (PROSCAR) 5 mg tablet, Take by mouth, Disp: , Rfl:   •  irbesartan (AVAPRO) 300 mg tablet, Take 1 tablet (300 mg total) by mouth daily, Disp: 90 tablet, Rfl: 3  •  magnesium oxide (MAG-OX) 400 mg tablet, Take 400 mg by mouth, Disp: , Rfl:   •  metoprolol succinate (TOPROL-XL) 25 mg 24 hr tablet, Take 25 mg by mouth daily, Disp: , Rfl:   •  mupirocin (BACTROBAN) 2 % ointment, , Disp: , Rfl:   •  nitroglycerin (NITROSTAT) 0.4 mg SL tablet, , Disp: , Rfl:   •  pantoprazole (PROTONIX) 40 mg tablet, TAKE 1 TABLET DAILY, Disp: 90 tablet, Rfl: 3  •  potassium chloride (MICRO-K) 10 MEQ CR capsule, TAKE 1 CAPSULE DAILY, Disp: 90 capsule, Rfl: 3  •  sotalol (BETAPACE) 120 mg tablet, TAKE 1 TABLET TWICE A DAY, Disp: 180 tablet, Rfl: 3  •  tamsulosin (FLOMAX) 0.4 mg, Take by mouth, Disp: , Rfl:   •  acetaminophen (TYLENOL) 500 mg tablet, Take by mouth (Patient not taking: Reported on 10/31/2023), Disp: , Rfl:   •  traMADol (ULTRAM) 50 mg tablet, Take 50 mg by mouth every 6 (six) hours as needed (Patient not taking: Reported on 6/13/2023), Disp: , Rfl:         Review of Systems:  Review of Systems      Physical Exam:  Physical Exam    This note was completed in part utilizing [employer] Software. Grammatical errors, random word insertions, spelling mistakes, and incomplete sentences can be an occasional consequence of this system secondary to software limitations, ambient noise, and hardware issues. If you have any questions or concerns about the content, text, or information contained within the body of this dictation, please contact the provider for clarification.

## 2023-10-30 ENCOUNTER — APPOINTMENT (RX ONLY)
Dept: URBAN - NONMETROPOLITAN AREA CLINIC 4 | Facility: CLINIC | Age: 73
Setting detail: DERMATOLOGY
End: 2023-10-30

## 2023-10-30 DIAGNOSIS — L82.0 INFLAMED SEBORRHEIC KERATOSIS: ICD-10-CM

## 2023-10-30 PROBLEM — D48.5 NEOPLASM OF UNCERTAIN BEHAVIOR OF SKIN: Status: ACTIVE | Noted: 2023-10-30

## 2023-10-30 PROCEDURE — ? COUNSELING

## 2023-10-30 PROCEDURE — ? PHOTO-DOCUMENTATION

## 2023-10-30 PROCEDURE — 17110 DESTRUCTION B9 LES UP TO 14: CPT | Mod: 59

## 2023-10-30 PROCEDURE — ? TREATMENT REGIMEN

## 2023-10-30 PROCEDURE — ? PRESCRIPTION

## 2023-10-30 PROCEDURE — ? LIQUID NITROGEN

## 2023-10-30 PROCEDURE — 11301 SHAVE SKIN LESION 0.6-1.0 CM: CPT

## 2023-10-30 PROCEDURE — ? SHAVE REMOVAL

## 2023-10-30 RX ORDER — MUPIROCIN 20 MG/G
2% OINTMENT TOPICAL BID
Qty: 22 | Refills: 2 | Status: ERX | COMMUNITY
Start: 2023-10-30

## 2023-10-30 RX ADMIN — MUPIROCIN 2%: 20 OINTMENT TOPICAL at 00:00

## 2023-10-30 ASSESSMENT — LOCATION DETAILED DESCRIPTION DERM: LOCATION DETAILED: RIGHT POSTERIOR SHOULDER

## 2023-10-30 ASSESSMENT — LOCATION ZONE DERM: LOCATION ZONE: ARM

## 2023-10-30 ASSESSMENT — LOCATION SIMPLE DESCRIPTION DERM: LOCATION SIMPLE: RIGHT SHOULDER

## 2023-10-30 NOTE — PROCEDURE: LIQUID NITROGEN
Render Note In Bullet Format When Appropriate: No
Detail Level: Detailed
Show Aperture Variable?: Yes
Medical Necessity Clause: This procedure was medically necessary because the lesions that were treated were:
Number Of Freeze-Thaw Cycles: 2 freeze-thaw cycles
Consent: The patient's consent was obtained including but not limited to risks of crusting, scabbing, blistering, scarring, darker or lighter pigmentary change, recurrence, incomplete removal and infection.
Application Tool (Optional): Cry-AC
Spray Paint Text: The liquid nitrogen was applied to the skin utilizing a spray paint frosting technique.
Post-Care Instructions: I reviewed with the patient in detail post-care instructions. Patient is to wear sunprotection, and avoid picking at any of the treated lesions. Pt may apply Vaseline to crusted or scabbing areas.
Medical Necessity Information: It is in your best interest to select a reason for this procedure from the list below. All of these items fulfill various CMS LCD requirements except the new and changing color options.

## 2023-10-30 NOTE — PROCEDURE: SHAVE REMOVAL
Medical Necessity Information: It is in your best interest to select a reason for this procedure from the list below. All of these items fulfill various CMS LCD requirements except the new and changing color options.
Medical Necessity Clause: This procedure was medically necessary because the lesion that was treated was:
Lab: 6
Lab Facility: 3
Body Location Override (Optional - Billing Will Still Be Based On Selected Body Map Location If Applicable): Right distal calf
Detail Level: Detailed
Was A Bandage Applied: Yes
Size Of Lesion In Cm (Required): 0.6
X Size Of Lesion In Cm (Optional): 0
Depth Of Shave: dermis
Biopsy Method: Dermablade
Anesthesia Type: 1% lidocaine with epinephrine
Anesthesia Volume In Cc: 0.5
Hemostasis: Aluminum Chloride and Electrocautery
Wound Care: Petrolatum
Path Notes (To The Dermatopathologist): Please check margins.
Render Path Notes In Note?: No
Consent was obtained from the patient. The risks and benefits to therapy were discussed in detail. Specifically, the risks of infection, scarring, bleeding, prolonged wound healing, incomplete removal, allergy to anesthesia, nerve injury and recurrence were addressed. Prior to the procedure, the treatment site was clearly identified and confirmed by the patient. All components of Universal Protocol/PAUSE Rule completed.
Post-Care Instructions: I reviewed with the patient in detail post-care instructions. Patient is to keep the biopsy site dry overnight, and then apply bacitracin twice daily until healed. Patient may apply hydrogen peroxide soaks to remove any crusting.
Notification Instructions: Patient will be notified of pathology results. However, patient instructed to call the office if not contacted within 2 weeks.
Billing Type: Third-Party Bill

## 2023-10-30 NOTE — PROCEDURE: TREATMENT REGIMEN
Initiate Treatment: Mupirocin 2% ointment BID until healed.
Detail Level: Zone
Initiate Treatment: Mupirocin 2% ointment BID until healed

## 2023-10-31 ENCOUNTER — OFFICE VISIT (OUTPATIENT)
Dept: CARDIOLOGY CLINIC | Facility: CLINIC | Age: 73
End: 2023-10-31
Payer: MEDICARE

## 2023-10-31 VITALS
TEMPERATURE: 97.3 F | DIASTOLIC BLOOD PRESSURE: 92 MMHG | HEIGHT: 74 IN | HEART RATE: 78 BPM | SYSTOLIC BLOOD PRESSURE: 170 MMHG | WEIGHT: 257.8 LBS | OXYGEN SATURATION: 97 % | BODY MASS INDEX: 33.09 KG/M2

## 2023-10-31 DIAGNOSIS — I10 HTN (HYPERTENSION), BENIGN: Primary | ICD-10-CM

## 2023-10-31 DIAGNOSIS — I10 PRIMARY HYPERTENSION: ICD-10-CM

## 2023-10-31 DIAGNOSIS — I48.0 PAF (PAROXYSMAL ATRIAL FIBRILLATION) (HCC): ICD-10-CM

## 2023-10-31 PROCEDURE — 93000 ELECTROCARDIOGRAM COMPLETE: CPT | Performed by: INTERNAL MEDICINE

## 2023-10-31 PROCEDURE — 99214 OFFICE O/P EST MOD 30 MIN: CPT | Performed by: INTERNAL MEDICINE

## 2023-10-31 RX ORDER — AMLODIPINE BESYLATE 5 MG/1
5 TABLET ORAL DAILY
Qty: 90 TABLET | Refills: 3 | Status: SHIPPED | OUTPATIENT
Start: 2023-10-31

## 2023-10-31 RX ORDER — IRBESARTAN 300 MG/1
300 TABLET ORAL DAILY
Qty: 90 TABLET | Refills: 3
Start: 2023-10-31

## 2023-11-20 DIAGNOSIS — I10 HTN (HYPERTENSION), BENIGN: ICD-10-CM

## 2023-11-20 DIAGNOSIS — I10 PRIMARY HYPERTENSION: ICD-10-CM

## 2023-11-20 NOTE — TELEPHONE ENCOUNTER
Dr Vy Vick wanted pt to call with his BP readings    11-1  127/65  11-2   --------  11/3  128/75  11/4  108/66  11/5  119/68  11/6  125/71  11/7  142/73  11/8  133/71  11/9  145/79 Per pt he was very active that day. 11/10 139/76  11/11--------  11/12 108/58  11/13 137/72  11/14 120/70  11/15 141/67  11/16 140/82  11/17 130/70  11/18 135/71  11/19 ? ? Please call pt [phone] if he needs to be on his BP meds he would like them called into [facility] a 90 day refill.

## 2023-11-21 RX ORDER — IRBESARTAN 300 MG/1
300 TABLET ORAL DAILY
Qty: 90 TABLET | Refills: 3 | Status: SHIPPED | OUTPATIENT
Start: 2023-11-21

## 2023-11-21 RX ORDER — AMLODIPINE BESYLATE 5 MG/1
5 TABLET ORAL DAILY
Qty: 90 TABLET | Refills: 3 | Status: SHIPPED | OUTPATIENT
Start: 2023-11-21

## 2023-11-30 ENCOUNTER — APPOINTMENT (RX ONLY)
Dept: URBAN - NONMETROPOLITAN AREA CLINIC 4 | Facility: CLINIC | Age: 73
Setting detail: DERMATOLOGY
End: 2023-11-30

## 2023-11-30 PROBLEM — C44.712 BASAL CELL CARCINOMA OF SKIN OF RIGHT LOWER LIMB, INCLUDING HIP: Status: ACTIVE | Noted: 2023-11-30

## 2023-11-30 PROCEDURE — ? PHOTO-DOCUMENTATION

## 2023-11-30 PROCEDURE — ? TREATMENT REGIMEN

## 2023-11-30 PROCEDURE — 17262 DSTRJ MAL LES T/A/L 1.1-2.0: CPT

## 2023-11-30 PROCEDURE — ? CURETTAGE AND DESTRUCTION WITH PATHOLOGY

## 2023-11-30 NOTE — PROCEDURE: TREATMENT REGIMEN
Plan: I reviewed with the patient in detail post-care instructions. Patient is to keep the area dry for 48 hours, and not to engage in any swimming until the area is healed. Should the patient develop any fevers, chills, bleeding, severe pain patient will contact the office immediately. Follow-up 1 month for wound check. \\n\\n** Dr. Rae was present and agreed with plan. **
Detail Level: Zone
Continue Regimen: mupirocin 2 % topical ointment QD

## 2023-11-30 NOTE — PROCEDURE: CURETTAGE AND DESTRUCTION WITH PATHOLOGY
Body Location Override (Optional - Billing Will Still Be Based On Selected Body Map Location If Applicable): Right distal calf
Detail Level: Detailed
Accession #: [id number]
Size Of Lesion In Cm: 1
Size Of Lesion After Curettage: 1.4
Anesthesia Type: 1% lidocaine with epinephrine
Anesthesia Volume In Cc: 3
Cautery Type: electrodesiccation
What Was Performed First?: Curettage
Additional Information: (Optional): The wound was cleaned, and a pressure dressing was applied.  The patient received detailed post-op instructions.
Lab: 6
Histology Text: Following the procedure a portion of the curetted material was sent for histologic evaluation.
Biopsy Type: H and E
Path Notes (To The Dermatopathologist): NO MARGINS.
Render Path Notes In Note?: No
Consent was obtained from the patient. The risks, benefits and alternatives to therapy were discussed in detail. Specifically, the risks of infection, scarring, bleeding, prolonged wound healing, nerve injury, incomplete removal, allergy to anesthesia and recurrence were addressed. Alternatives to ED&C, such as: surgical removal and XRT were also discussed.  Prior to the procedure, the treatment site was clearly identified and confirmed by the patient. All components of Universal Protocol/PAUSE Rule completed.
Post-Care Instructions: I reviewed with the patient in detail post-care instructions. Patient is to keep the area dry for 48 hours, and not to engage in any swimming until the area is healed. Should the patient develop any fevers, chills, bleeding, severe pain patient will contact the office immediately.
Billing Type: Third-Party Bill
Bill As A Line Item Or As Units: Line Item

## 2023-12-20 DIAGNOSIS — E78.2 MIXED HYPERLIPIDEMIA: ICD-10-CM

## 2023-12-20 DIAGNOSIS — I25.10 CORONARY ARTERY DISEASE INVOLVING NATIVE CORONARY ARTERY OF NATIVE HEART WITHOUT ANGINA PECTORIS: ICD-10-CM

## 2023-12-20 RX ORDER — ATORVASTATIN CALCIUM 40 MG/1
TABLET, FILM COATED ORAL
Qty: 90 TABLET | Refills: 3 | Status: SHIPPED | OUTPATIENT
Start: 2023-12-20

## 2024-01-17 ENCOUNTER — APPOINTMENT (RX ONLY)
Dept: URBAN - NONMETROPOLITAN AREA CLINIC 4 | Facility: CLINIC | Age: 74
Setting detail: DERMATOLOGY
End: 2024-01-17

## 2024-01-17 DIAGNOSIS — Z85.828 PERSONAL HISTORY OF OTHER MALIGNANT NEOPLASM OF SKIN: ICD-10-CM | Status: STABLE

## 2024-01-17 PROCEDURE — ? COUNSELING

## 2024-01-17 PROCEDURE — ? PHOTO-DOCUMENTATION

## 2024-01-17 PROCEDURE — ? OTHER

## 2024-01-17 PROCEDURE — 99212 OFFICE O/P EST SF 10 MIN: CPT

## 2024-01-17 ASSESSMENT — LOCATION ZONE DERM: LOCATION ZONE: LEG

## 2024-01-17 ASSESSMENT — LOCATION DETAILED DESCRIPTION DERM: LOCATION DETAILED: RIGHT DISTAL CALF

## 2024-01-17 ASSESSMENT — LOCATION SIMPLE DESCRIPTION DERM: LOCATION SIMPLE: RIGHT CALF

## 2024-01-17 NOTE — PROCEDURE: OTHER
Other (Free Text): Treated deep sauceration with ED+C 11/30/23. Will follow up in 3 months for FBSE and cancer check.
Detail Level: Zone
Render Risk Assessment In Note?: no
Note Text (......Xxx Chief Complaint.): This diagnosis correlates with the

## 2024-04-23 DIAGNOSIS — I10 HYPERTENSION, UNSPECIFIED TYPE: ICD-10-CM

## 2024-04-24 RX ORDER — POTASSIUM CHLORIDE 750 MG/1
CAPSULE, EXTENDED RELEASE ORAL
Qty: 90 CAPSULE | Refills: 0 | Status: SHIPPED | OUTPATIENT
Start: 2024-04-24

## 2024-04-30 DIAGNOSIS — I48.0 PAF (PAROXYSMAL ATRIAL FIBRILLATION) (HCC): ICD-10-CM

## 2024-04-30 NOTE — TELEPHONE ENCOUNTER
Ideally the patient should continue clopidogrel/Plavix given his history of stenting  It is okay to stop Eliquis 1-2 days prior to procedure  If they do not feel comfortable doing the procedure on clopidogrel/Plavix they should contact me for further instructions  Can you please find out where he is having the procedure done  Thank you  Patient was fit with a set of [employer]ia AI 24 LUPE RTs (right: [id number]; left: [id number]; : [id number]). Warranty expires 6/17/27. The aids were set to adaptation level 1 with volume controls activated. The patient reports that the aids sound good in the office. Discussed use and care of the aids. The patient was scheduled to return in 2 weeks.  Javier Purdy CCC-A

## 2024-05-01 RX ORDER — APIXABAN 5 MG/1
TABLET, FILM COATED ORAL
Qty: 180 TABLET | Refills: 1 | Status: SHIPPED | OUTPATIENT
Start: 2024-05-01

## 2024-05-10 DIAGNOSIS — K21.9 GASTROESOPHAGEAL REFLUX DISEASE WITHOUT ESOPHAGITIS: ICD-10-CM

## 2024-05-10 DIAGNOSIS — I10 ESSENTIAL HYPERTENSION: ICD-10-CM

## 2024-05-10 RX ORDER — PANTOPRAZOLE SODIUM 40 MG/1
TABLET, DELAYED RELEASE ORAL
Qty: 90 TABLET | Refills: 1 | Status: SHIPPED | OUTPATIENT
Start: 2024-05-10

## 2024-06-12 DIAGNOSIS — I25.10 CORONARY ARTERY DISEASE INVOLVING NATIVE CORONARY ARTERY OF NATIVE HEART WITHOUT ANGINA PECTORIS: ICD-10-CM

## 2024-06-12 RX ORDER — CLOPIDOGREL BISULFATE 75 MG/1
TABLET ORAL
Qty: 90 TABLET | Refills: 1 | Status: SHIPPED | OUTPATIENT
Start: 2024-06-12

## 2024-07-19 DIAGNOSIS — I10 HYPERTENSION, UNSPECIFIED TYPE: ICD-10-CM

## 2024-07-19 RX ORDER — POTASSIUM CHLORIDE 750 MG/1
CAPSULE, EXTENDED RELEASE ORAL
Qty: 90 CAPSULE | Refills: 0 | Status: SHIPPED | OUTPATIENT
Start: 2024-07-19

## 2024-07-22 ENCOUNTER — HOSPITAL ENCOUNTER (OUTPATIENT)
Dept: NON INVASIVE DIAGNOSTICS | Facility: HOSPITAL | Age: 74
Discharge: HOME | End: 2024-07-22
Payer: MEDICARE

## 2024-07-22 DIAGNOSIS — I65.21 CAROTID OCCLUSION, RIGHT: ICD-10-CM

## 2024-07-22 PROCEDURE — 93880 EXTRACRANIAL BILAT STUDY: CPT

## 2024-07-22 PROCEDURE — 93880 EXTRACRANIAL BILAT STUDY: CPT | Performed by: SURGERY

## 2024-08-13 DIAGNOSIS — I48.0 PAF (PAROXYSMAL ATRIAL FIBRILLATION) (HCC): ICD-10-CM

## 2024-08-14 RX ORDER — SOTALOL HYDROCHLORIDE 120 MG/1
120 TABLET ORAL 2 TIMES DAILY
Qty: 180 TABLET | Refills: 3 | Status: SHIPPED | OUTPATIENT
Start: 2024-08-14

## 2024-08-20 ENCOUNTER — OFFICE VISIT (OUTPATIENT)
Dept: CARDIOLOGY CLINIC | Facility: CLINIC | Age: 74
End: 2024-08-20
Payer: MEDICARE

## 2024-08-20 VITALS
OXYGEN SATURATION: 96 % | HEIGHT: 74 IN | HEART RATE: 80 BPM | DIASTOLIC BLOOD PRESSURE: 80 MMHG | WEIGHT: 248 LBS | BODY MASS INDEX: 31.83 KG/M2 | SYSTOLIC BLOOD PRESSURE: 118 MMHG

## 2024-08-20 DIAGNOSIS — I65.21 CAROTID OCCLUSION, RIGHT: ICD-10-CM

## 2024-08-20 DIAGNOSIS — I73.9 PAD (PERIPHERAL ARTERY DISEASE) (HCC): ICD-10-CM

## 2024-08-20 DIAGNOSIS — I73.9 CLAUDICATION (HCC): ICD-10-CM

## 2024-08-20 DIAGNOSIS — I48.0 PAF (PAROXYSMAL ATRIAL FIBRILLATION) (HCC): ICD-10-CM

## 2024-08-20 DIAGNOSIS — I10 PRIMARY HYPERTENSION: ICD-10-CM

## 2024-08-20 DIAGNOSIS — Z95.1 S/P CABG (CORONARY ARTERY BYPASS GRAFT): ICD-10-CM

## 2024-08-20 DIAGNOSIS — I25.10 CORONARY ARTERY DISEASE INVOLVING NATIVE CORONARY ARTERY OF NATIVE HEART WITHOUT ANGINA PECTORIS: Primary | ICD-10-CM

## 2024-08-20 DIAGNOSIS — R20.0 NUMBNESS IN FEET: ICD-10-CM

## 2024-08-20 PROCEDURE — 99214 OFFICE O/P EST MOD 30 MIN: CPT | Performed by: INTERNAL MEDICINE

## 2024-08-20 PROCEDURE — G2211 COMPLEX E/M VISIT ADD ON: HCPCS | Performed by: INTERNAL MEDICINE

## 2024-08-20 NOTE — ASSESSMENT & PLAN NOTE
He has had a known right internal carotid artery occlusion with no evidence of any prior stroke, which is quite remarkable.  Left carotid artery shows mild less than 50% plaque.  Most recent lipid profile shows excellent numbers.  He does follow-up with vascular surgery annually.

## 2024-08-20 NOTE — ASSESSMENT & PLAN NOTE
Based on his recent symptoms of bilateral toe numbness and some mild claudication, we will schedule him for lower extremity arterial duplex study.

## 2024-08-20 NOTE — ASSESSMENT & PLAN NOTE
Patient remains in normal sinus rhythm on sotalol and metoprolol.  No bleeding issues so far with Eliquis.

## 2024-08-20 NOTE — ASSESSMENT & PLAN NOTE
Stable CAD post CABG with no angina.  Last nuclear stress test in 2021 showed inferior infarct with preserved LVEF and no ischemia.  Last echocardiogram in 2022 reported normal left ventricular systolic function with only mild mitral regurgitation.  Will continue to monitor clinically.   I have advised the patient to try to do low-level cardio workouts at least 4 days a week with 15 minutes of activity and he may use a stationary under the table elliptical machine, in view of his significant knee osteoarthritis.

## 2024-08-20 NOTE — PROGRESS NOTES
[facility] [place]  [address]  [address]  [facility] [address] [phone]    Patient Name: Dorian Restrepo  YOB: 1950 ;male  MR No: [id number]      Diagnosis ICD-10-CM Associated Orders   1. Coronary artery disease involving native coronary artery of native heart without angina pectoris  I25.10       2. PAF (paroxysmal atrial fibrillation) ([facility])  I48.0       3. Primary hypertension  I10       4. S/P CABG (coronary artery bypass graft)  Z95.1       5. Numbness in feet  R20.0 VAS ARTERIAL DUPLEX- LOWER LIMB BILATERAL      6. Claudication ([facility])  I73.9       7. Carotid occlusion, right  I65.21           ASSESSMENT AND RECOMMENDATIONS:  1. Coronary artery disease involving native coronary artery of native heart without angina pectoris  Assessment & Plan:  Stable CAD post CABG with no angina.  Last nuclear stress test in 2021 showed inferior infarct with preserved LVEF and no ischemia.  Last echocardiogram in 2022 reported normal left ventricular systolic function with only mild mitral regurgitation.  Will continue to monitor clinically.   I have advised the patient to try to do low-level cardio workouts at least 4 days a week with 15 minutes of activity and he may use a stationary under the table elliptical machine, in view of his significant knee osteoarthritis.  2. PAF (paroxysmal atrial fibrillation) ([facility])  Assessment & Plan:  Patient remains in normal sinus rhythm on sotalol and metoprolol.  No bleeding issues so far with Eliquis.  3. Primary hypertension  Assessment & Plan:  Blood pressure remains quite well-controlled on current medications.  4. S/P CABG (coronary artery bypass graft)  5. Numbness in feet  Assessment & Plan:  Based on his recent symptoms of bilateral toe numbness and some mild claudication, we will schedule him for lower extremity arterial duplex study.  Orders:  -     VAS ARTERIAL DUPLEX- LOWER LIMB BILATERAL; Future;  Expected date: 08/20/2024  6. Claudication (HCC)  Assessment & Plan:  Based on his recent symptoms of bilateral toe numbness and some mild claudication, we will schedule him for lower extremity arterial duplex study.  7. Carotid occlusion, right  Assessment & Plan:  He has had a known right internal carotid artery occlusion with no evidence of any prior stroke, which is quite remarkable.  Left carotid artery shows mild less than 50% plaque.  Most recent lipid profile shows excellent numbers.  He does follow-up with vascular surgery annually.       PRESENTING COMPLAINT:  CAD, atrial fibrillation    HPI:  74-year-old male with past medical history significant for coronary artery disease status post PCI to RCA with 2 drug-eluting stents in 2008 followed by CABG x 2 with LIMA to LAD and vein graft to obtuse marginal in July 2016, paroxysmal atrial fibrillation, known right carotid artery occlusion and hypertension who presents for his routine follow-up visit.  Patient is active at home but admits to not doing any regular exercise because of bilateral knee arthritis and denies any chest pain, dyspnea on exertion, palpitations or syncope.  His initial angina symptoms were dyspnea on exertion.  He does complain of some bilateral feet and toe numbness towards the end of the day as well as some calf discomfort on walking.    CURRENT  MEDICATIONS:      Current Outpatient Medications:     allopurinol (ZYLOPRIM) 300 mg tablet, Take 300 mg by mouth daily , Disp: , Rfl:     amLODIPine (NORVASC) 5 mg tablet, Take 1 tablet (5 mg total) by mouth daily, Disp: 90 tablet, Rfl: 3    apixaban (Eliquis) 5 mg, TAKE 1 TABLET EVERY 12 HOURS, Disp: 180 tablet, Rfl: 1    atorvastatin (LIPITOR) 40 mg tablet, TAKE 1 TABLET DAILY, Disp: 90 tablet, Rfl: 3    Cholecalciferol (VITAMIN D3 HIGH POTENCY PO), Take by mouth, Disp: , Rfl:     clopidogrel (PLAVIX) 75 mg tablet, TAKE 1 TABLET DAILY, Disp: 90 tablet, Rfl: 1    finasteride (PROSCAR) 5 mg  tablet, Take by mouth, Disp: , Rfl:     irbesartan (AVAPRO) 300 mg tablet, Take 1 tablet (300 mg total) by mouth daily, Disp: 90 tablet, Rfl: 3    magnesium oxide (MAG-OX) 400 mg tablet, Take 400 mg by mouth, Disp: , Rfl:     metoprolol succinate (TOPROL-XL) 25 mg 24 hr tablet, Take 25 mg by mouth daily, Disp: , Rfl:     nitroglycerin (NITROSTAT) 0.4 mg SL tablet, , Disp: , Rfl:     pantoprazole (PROTONIX) 40 mg tablet, TAKE 1 TABLET DAILY, Disp: 90 tablet, Rfl: 1    potassium chloride (MICRO-K) 10 MEQ CR capsule, TAKE 1 CAPSULE DAILY, Disp: 90 capsule, Rfl: 0    sotalol (BETAPACE) 120 mg tablet, TAKE 1 TABLET TWICE A DAY, Disp: 180 tablet, Rfl: 3    tamsulosin (FLOMAX) 0.4 mg, Take by mouth, Disp: , Rfl:     acetaminophen (TYLENOL) 500 mg tablet, Take by mouth (Patient not taking: Reported on 10/31/2023), Disp: , Rfl:     mupirocin (BACTROBAN) 2 % ointment, , Disp: , Rfl:     ALLERGIES  No Known Allergies    Lab Results   Component Value Date    CREATININE 0.90 10/27/2023    CREATININE 0.82 06/27/2023    K 4.3 10/27/2023    K 4.7 08/14/2023    SODIUM 138 10/27/2023    SODIUM 140 06/27/2023         REVIEW OF SYSTEMS   Positive for: Knee arthritis, bilateral feet numbness  Negative for: All remaining as reviewed below and in HPI.    SYSTEM SYMPTOMS REVIEWED:  General--weight change, fever, night sweats  Respiratory--cough, wheezing, shortness of breath, sputum production  Cardiovascular--chest pain, syncope, dyspnea on exertion, edema, decline in exercise tolerance, claudication   Gastrointestinal--persistent vomiting, diarrhea, abdominal distention, blood in stool   Muscular or skeletal--joint pain or swelling   Neurologic--headaches, syncope, abnormal movement  Hematologic--history of easy bruising and bleeding   Endocrine--thyroid enlargement, heat or cold intolerance, polyuria   Psychiatric--anxiety, depression     General physical examination:    General appearance: Alert, no acute distress, appears stated age,  "mild obesity.  HEENT: Mucous membranes are moist.  No obvious abnormality noted.  Neck: Supple with no lymphadenopathy.  No JVD.  Carotid pulses are intact.  No carotid bruit.  Cardiovascular system: Regular rhythm.  Normal S1 and S2.  No murmurs.  No rubs or gallops. Extremities: No edema. No cyanosis.  Pulmonary: Respirations unlabored.  Good air entry bilaterally.  Clear to auscultation bilaterally.  Gastrointestinal: Abdomen is soft and nontender.  Bowel sounds are positive.  Musculoskeletal: Upper Extremities: Normal upper motor strength. Lower Extremity: Normal motor strength. Gait: Normal.   Skin: Skin is warm. No rashes or lesions.  Neurological: Patient is alert and oriented with no gross motor deficits.  Psychiatric: Mood is normal.  Behavior is normal.    Vitals:    08/20/24 0904   BP: 118/80   Pulse: 80   SpO2: 96%      Body mass index is 31.84 kg/m².  Wt Readings from Last 3 Encounters:   08/20/24 112 kg (248 lb)   10/31/23 117 kg (257 lb 12.8 oz)   09/06/23 115 kg (253 lb)             Danial Crowder MD, FACC, MERI    Portions of the record  have been created with voice recognition software.  Occasional grammatical mistakes or wrong word or \"sound alike\" substitutions may have occurred due to the inherent limitations of voice recognition software. Please reach out to me directly for any clarifications.   "

## 2024-08-29 ENCOUNTER — HOSPITAL ENCOUNTER (OUTPATIENT)
Dept: NON INVASIVE DIAGNOSTICS | Facility: HOSPITAL | Age: 74
Discharge: HOME | End: 2024-08-29
Attending: INTERNAL MEDICINE
Payer: MEDICARE

## 2024-08-29 ENCOUNTER — TELEPHONE (OUTPATIENT)
Dept: CARDIOLOGY CLINIC | Facility: CLINIC | Age: 74
End: 2024-08-29

## 2024-08-29 DIAGNOSIS — R20.0 NUMBNESS IN FEET: ICD-10-CM

## 2024-08-29 PROCEDURE — 93925 LOWER EXTREMITY STUDY: CPT | Performed by: SURGERY

## 2024-08-29 PROCEDURE — 93922 UPR/L XTREMITY ART 2 LEVELS: CPT | Performed by: SURGERY

## 2024-08-29 PROCEDURE — 93923 UPR/LXTR ART STDY 3+ LVLS: CPT

## 2024-08-29 PROCEDURE — 93925 LOWER EXTREMITY STUDY: CPT

## 2024-08-29 RX ORDER — CILOSTAZOL 50 MG/1
50 TABLET ORAL 2 TIMES DAILY
Qty: 60 TABLET | Refills: 2 | Status: SHIPPED | OUTPATIENT
Start: 2024-08-29

## 2024-08-29 NOTE — TELEPHONE ENCOUNTER
----- Message from Danial Crowder MD sent at 8/29/2024  3:06 PM EDT -----  Please let him know that his lower extremity arterial ultrasound shows moderate disease in both lower leg arteries which will explain some of his symptoms.  I have put an order for an ambulatory referral to vascular surgery.  Please let him know how to schedule that.  Also let him know that I am starting him on Pletal 50 mg twice a day for symptom relief.  I have sent the prescription to his pharmacy.

## 2024-08-29 NOTE — TELEPHONE ENCOUNTER
Spoke w/pt he understood about his results.  He also has an apt with vascular on 9/18 in [place].

## 2024-09-03 ENCOUNTER — TELEPHONE (OUTPATIENT)
Age: 74
End: 2024-09-03

## 2024-09-03 NOTE — TELEPHONE ENCOUNTER
He can try holding it for a week to see if he notices a difference off it just to be sure it is not something else causing the diarrhea. If the diarrhea goes away after coming off the medication Dr. Crowder may want to consider alternate treatment. Please have him update Dr. Crowder after a week off the medication.  Thank you!

## 2024-09-03 NOTE — TELEPHONE ENCOUNTER
Pt called stating Pletal is causing diarrhea. He has not tried imodium yet, and asking for your recommendations?

## 2024-09-16 ENCOUNTER — NURSE TRIAGE (OUTPATIENT)
Age: 74
End: 2024-09-16

## 2024-09-16 NOTE — TELEPHONE ENCOUNTER
Patient aware and verbally understands instructions. Patient repeated the instructions back to me verbally

## 2024-09-16 NOTE — TELEPHONE ENCOUNTER
Chief Complaint: Lightheaded on standing    History of Present Illness (HPI): About one week    VS/Weight: BP in one teens.  Had one BP 96/50 yesterday.  Has not taken notice of his heart rate.    Pain: No    Risk Factors: Hypertension, Diabetic, and Arrhythmia      Medicine: Sotalol, Avapro, Norvasc, Toprol XL    Upcoming Office Visit: No    Last Office Visit: 8/20    Additional Comments: Patient had a mechanical fall last week, went to  ER. Since then has been experiencing lightheadedness on standing.  At [facility] yesterday, had to sit down after standing and someone had to drive him home.  Lowest BP 96/50.  Mostly 110-115 systolic.  Does not know heart rate.

## 2024-09-16 NOTE — TELEPHONE ENCOUNTER
"  Reason for Disposition   Taking a medicine that could cause dizziness (e.g., blood pressure medications, diuretics)    Answer Assessment - Initial Assessment Questions  1. DESCRIPTION: \"Describe your dizziness.\"      Feels lighthead on standing.    3. VERTIGO: \"Do you feel like either you or the room is spinning or tilting?\" (i.e. vertigo)      No  4. SEVERITY: \"How bad is it?\"  \"Do you feel like you are going to faint?\" \"Can you stand and walk?\" Feels lightheaded on standing and has to sit down right away          5. ONSET:  \"When did the dizziness begin?\"      About a week ago   6. AGGRAVATING FACTORS: \"Does anything make it worse?\" (e.g., standing, change in head position)      Standing  7. HEART RATE: \"Can you tell me your heart rate?\" \"How many beats in 15 seconds?\"  (Note: not all patients can do this)        Does not monitor  8. CAUSE: \"What do you think is causing the dizziness?\"      Unsure  9. RECURRENT SYMPTOM: \"Have you had dizziness before?\" If Yes, ask: \"When   10. OTHER SYMPTOMS: \"Do you have any other symptoms?\" (e.g., fever, chest pain, vomiting, diarrhea, bleeding)        No other symptoms    Protocols used: Dizziness-ADULT-[place]    "

## 2024-09-16 NOTE — TELEPHONE ENCOUNTER
Please tell the patient to hold the amlodipine as well as the irbesartan for 2 days and then once his blood pressure is up to 130/80 or higher, then resume irbesartan but only at half the dose i.e. 150 mg instead of the 300 mg.  Keep the amlodipine off until I tell him differently.

## 2024-09-18 ENCOUNTER — OFFICE VISIT (OUTPATIENT)
Dept: VASCULAR SURGERY | Facility: HOSPITAL | Age: 74
End: 2024-09-18
Payer: MEDICARE

## 2024-09-18 VITALS
DIASTOLIC BLOOD PRESSURE: 76 MMHG | HEIGHT: 74 IN | HEART RATE: 100 BPM | BODY MASS INDEX: 31.83 KG/M2 | WEIGHT: 248 LBS | SYSTOLIC BLOOD PRESSURE: 140 MMHG

## 2024-09-18 DIAGNOSIS — I73.9 PERIPHERAL VASCULAR DISEASE (HCC): Primary | ICD-10-CM

## 2024-09-18 DIAGNOSIS — I65.21 CAROTID OCCLUSION, RIGHT: ICD-10-CM

## 2024-09-18 DIAGNOSIS — N18.31 STAGE 3A CHRONIC KIDNEY DISEASE (HCC): ICD-10-CM

## 2024-09-18 PROCEDURE — 99213 OFFICE O/P EST LOW 20 MIN: CPT

## 2024-09-18 RX ORDER — TOLTERODINE 4 MG/1
CAPSULE, EXTENDED RELEASE ORAL
COMMUNITY
Start: 2024-09-03

## 2024-09-18 NOTE — ASSESSMENT & PLAN NOTE
-Several year history of RLE calf cramping when going up inclines. He is able to walk on flat surfaces without issues. He denies rest pain or nonhealing wounds. Symptoms are unchanged.   -Sustained a fall 2 weeks ago and has a small abrasion on the anterior L knee that is scabbed over. No signs of infection.   -He was recently started on pletal by his cardiologist. He has had episodes of diarrhea since starting and stopped pletal for a little and symptoms resolved. He states he has not been taking long enough to notice any improvement in his symptoms.   -OVIDIO 8/29/24 demonstrates diffuse disease in BLE with multiple focal stenosis in the RLE with >75% mid SFA and proximal PT stenosis. There is also a 50-75% stenosis in the left proximal AT. R MARISOL NC/43/21; L MARISOL NC/61/23.   -Nonpalpable pedal pulses. Motor and sensation intact.     Plan:  -Educated patient on pathophysiology of peripheral arterial disease, available treatment options, and indications for treatment.   -No surgical intervention indicated as his symptoms are not disabling and have been stable. He denies rest pain or nonhealing wounds.   -Repeat OVIDIO in 1 year for re-evaluation.   -Discussed risk factor modification, including adequate glycemic and lipid control, blood pressure, and lifestyle modifications.   -Continue medical optimization with daily plavix, pletal, and statin therapy with LDL goal <100.   -He is on eliquis for Afib. Mgmt per Cardiology.   -Recommend starting a structured walking program 3-5 times/week for 30 minutes. Patient should walk until claudication symptoms occur, rest for 5-10 minutes, then continue to walk. Patient should increase distance each week.   -Monitor L knee wound. Cleanse daily and keep clean/dry. Notify the office if the wound is not healing appropriately.   -Follow up in 1 year or sooner if needed.   -Instructed patient to call the office with questions, concerns, or new symptoms.

## 2024-09-18 NOTE — ASSESSMENT & PLAN NOTE
-Known R ICA occlusion with asymptomatic mild L ICA stenosis.   -CV duplex 7/22/24 demonstrates known right ICA occlusion with less than 50% contralateral disease (velocity 133/42).  Vertebral flow is antegrade bilaterally.  -Asymptomatic. Denies TIA/CVA symptoms. No prior hx of CVA.     Plan:  -Discussed the pathophysiology of carotid stenosis, available treatment options, and indications for intervention.   -No surgical intervention recommended for known right ICA occlusion.  -Obtain updated carotid artery duplex in 1 year to monitor left ICA stenosis.  -Continue plavix (CAD) and statin.   -On Eliquis for A-fib.  Management per cardiology.  -Educated on s/sx of worsening condition, including TIA/Stroke-like symptoms, and to call 911 or go to the ED if symptoms occur.         Orders:    VAS carotid complete study; Future

## 2024-09-18 NOTE — PROGRESS NOTES
Ambulatory Visit  Name: Dorian Restrepo      : 1950      MRN: [id number]  Encounter Provider: Freya Winchester PA-C  Encounter Date: 2024   Encounter department: [facility]    74 y.o. male with PMH of CAD s/p CABG, HTN, PAF (on Eliquis), R TKR, HLD, CKD3a, right ICA occlusion, PAD with intermittent RLE calf claudication.    Assessment & Plan  Peripheral vascular disease (HCC)  -Several year history of RLE calf cramping when going up inclines. He is able to walk on flat surfaces without issues. He denies rest pain or nonhealing wounds. Symptoms are unchanged.   -Sustained a fall 2 weeks ago and has a small abrasion on the anterior L knee that is scabbed over. No signs of infection.   -He was recently started on pletal by his cardiologist. He has had episodes of diarrhea since starting and stopped pletal for a little and symptoms resolved. He states he has not been taking long enough to notice any improvement in his symptoms.   -OVIDIO 24 demonstrates diffuse disease in BLE with multiple focal stenosis in the RLE with >75% mid SFA and proximal PT stenosis. There is also a 50-75% stenosis in the left proximal AT. R MARISOL NC/43/21; L MARISOL NC/61/23.   -Nonpalpable pedal pulses. Motor and sensation intact.     Plan:  -Educated patient on pathophysiology of peripheral arterial disease, available treatment options, and indications for treatment.   -No surgical intervention indicated as his symptoms are not disabling and have been stable. He denies rest pain or nonhealing wounds.   -Repeat OVIDIO in 1 year for re-evaluation.   -Discussed risk factor modification, including adequate glycemic and lipid control, blood pressure, and lifestyle modifications.   -Continue medical optimization with daily plavix, pletal, and statin therapy with LDL goal <100.   -He is on eliquis for Afib. Mgmt per Cardiology.   -Recommend starting a structured walking program 3-5 times/week for 30 minutes. Patient should  walk until claudication symptoms occur, rest for 5-10 minutes, then continue to walk. Patient should increase distance each week.   -Monitor L knee wound. Cleanse daily and keep clean/dry. Notify the office if the wound is not healing appropriately.   -Follow up in 1 year or sooner if needed.   -Instructed patient to call the office with questions, concerns, or new symptoms.        Carotid occlusion, right  -Known R ICA occlusion with asymptomatic mild L ICA stenosis.   -CV duplex 24 demonstrates known right ICA occlusion with less than 50% contralateral disease (velocity 133/42).  Vertebral flow is antegrade bilaterally.  -Asymptomatic. Denies TIA/CVA symptoms. No prior hx of CVA.     Plan:  -Discussed the pathophysiology of carotid stenosis, available treatment options, and indications for intervention.   -No surgical intervention recommended for known right ICA occlusion.  -Obtain updated carotid artery duplex in 1 year to monitor left ICA stenosis.  -Continue plavix (CAD) and statin.   -On Eliquis for A-fib.  Management per cardiology.  -Educated on s/sx of worsening condition, including TIA/Stroke-like symptoms, and to call 911 or go to the ED if symptoms occur.         Orders:    VAS carotid complete study; Future      History of Present Illness     Dorian Restrepo is a 74 y.o. male who presents     1 week ago he fell in the basement of a  home and has been gradually recovering from this. He states since then he has been issues with his blood pressures being low and was getting dizziness episodes. He called his cardiologist who had him stop taking some of his BP medication and cut the irbesartan in half. Today his BP was 140/76. He monitors his BP at home. He has some soreness in the left side of his chest under his breast and his left leg. He has a small abrasion on the left knee that has been healing.     He reports intermittent right calf cramping up inclines or stairs. He denies issues on flat  "surfaces. He states his symptoms have been going on for several years and have not worsen. He reports occasional cramping in his legs at night but does not happen every day or a regular occurrence. He drinks a lot of water throughout the day and has to get up to go to the bathroom. He has no issues with wound healing. He has a small abrasion on the left knee that he got when he fell 1 week ago and is scabbed over. At night after taking his socks and shoes off he will notice tingling \"pins and needles\" in both toes and episodes last 15 minutes. He has no known history of diabetes or neuropathy. He has had lower back surgery over 40 years ago. He states his back has felt better as time went on. He denies any new sciatic pain.     He has a right knee replacement 2-3 years at [facility]. He has some discomfort in his knee that comes and goes.     He is on eliquis for Afib.     He was recently started on pletal by his cardiologist a few weeks ago. He states since then he had episodes of diarrhea and stopped the medication but recently started up again.       History obtained from : patient  Review of Systems   Constitutional: Negative.    HENT: Negative.     Eyes: Negative.    Respiratory: Negative.     Cardiovascular: Negative.    Gastrointestinal: Negative.    Endocrine: Negative.    Genitourinary: Negative.    Musculoskeletal: Negative.    Skin: Negative.    Allergic/Immunologic: Negative.    Neurological: Negative.    Hematological: Negative.    Psychiatric/Behavioral: Negative.     I have personally reviewed and made appropriate changes to the ROS that was input by the medical assistant.         Vitals:    09/18/24 1059   BP: 140/76   BP Location: Left arm   Patient Position: Sitting   Cuff Size: Standard   Pulse: 100   Weight: 112 kg (248 lb)   Height: 6' 2\" (1.88 m)       Patient Active Problem List   Diagnosis    Carotid occlusion, right    CAD (coronary artery disease)    Hypertension    Hyperlipidemia    Non-rheumatic " mitral regurgitation    Obesity (BMI 30-39.9)    PAF (paroxysmal atrial fibrillation) ([facility])    S/P CABG (coronary artery bypass graft)    Dyspnea on exertion    Peripheral vascular disease ([facility])    Stage 3a chronic kidney disease ([facility])    Benign prostatic hyperplasia with lower urinary tract symptoms    Numbness in feet    Claudication ([facility])       Past Surgical History:   Procedure Laterality Date    CARDIAC CATHETERIZATION  10/01/2008    RCA with 100% subacute occlusion. DEIRDRE x2 to RCA. Ostial left main disease with FFR 0.78.     CARDIAC CATHETERIZATION  03/16/2012    Moderate 50% ostial left main. FFR 0.82. Distal RCA 40%. No intervention.     CARDIAC CATHETERIZATION  11/24/2014    Ostial left main 60%. Ostial left circumflex 70%.     CARDIAC CATHETERIZATION  06/29/2016    Left main 50%. FFR was less than 0.75. CABG recommended.     CARDIOVERSION  07/01/2017    CEREBRAL ANGIOGRAM  12/12/2013    Carotid angiography. Left internal carotid artery 40%. Right internal carotid artery 100% occlusion.     CHOLECYSTECTOMY      CORONARY ARTERY BYPASS GRAFT  07/29/2016    LIMA-LAD. SVG-OM.     LUMBAR DISC SURGERY      TONSILLECTOMY         Family History   Problem Relation Age of Onset    Heart disease Mother     Heart failure Father        Social History     Socioeconomic History    Marital status: /Civil Union     Spouse name: Not on file    Number of children: Not on file    Years of education: Not on file    Highest education level: Not on file   Occupational History    Occupation: Retired   Tobacco Use    Smoking status: Former    Smokeless tobacco: Never   Vaping Use    Vaping status: Never Used   Substance and Sexual Activity    Alcohol use: Yes     Alcohol/week: 1.0 standard drink of alcohol     Types: 1 Cans of beer per week     Comment: rarely    Drug use: Never    Sexual activity: Not on file     Comment: Defer   Other Topics Concern    Not on file   Social History Narrative    Not on file     Social  Determinants of Health     Financial Resource Strain: Low Risk  (10/25/2023)    Received from [facility]    Overall Financial Resource Strain (CARDIA)     Difficulty of Paying Living Expenses: Not hard at all   Food Insecurity: No Food Insecurity (10/25/2023)    Received from [facility]    Hunger Vital Sign     Worried About Running Out of Food in the Last Year: Never true     Ran Out of Food in the Last Year: Never true   Transportation Needs: No Transportation Needs (10/25/2023)    Received from [facility]    PRAPARE - Transportation     Lack of Transportation (Medical): No     Lack of Transportation (Non-Medical): No   Physical Activity: Not on file   Stress: No Stress Concern Present (10/25/2023)    Received from [facility]    [employer] [place] - Occupational Stress Questionnaire     Feeling of Stress : Not at all   Social Connections: Unknown (6/18/2024)    Received from [facility]    Social Connections     How often do you feel lonely or isolated from those around you? (Adult - for ages 18 years and over): Not on file   Intimate Partner Violence: Not At Risk (10/25/2023)    Received from [facility]    Humiliation, Afraid, Rape, and Kick questionnaire     Fear of Current or Ex-Partner: No     Emotionally Abused: No     Physically Abused: No     Sexually Abused: No   Housing Stability: Not on file       No Known Allergies      Current Outpatient Medications:     allopurinol (ZYLOPRIM) 300 mg tablet, Take 300 mg by mouth daily , Disp: , Rfl:     amLODIPine (NORVASC) 5 mg tablet, Take 1 tablet (5 mg total) by mouth daily, Disp: 90 tablet, Rfl: 3    apixaban (Eliquis) 5 mg, TAKE 1 TABLET EVERY 12 HOURS, Disp: 180 tablet, Rfl: 1    atorvastatin (LIPITOR) 40 mg tablet, TAKE 1 TABLET DAILY, Disp: 90 tablet, Rfl: 3    Cholecalciferol (VITAMIN D3 HIGH POTENCY PO), Take by mouth, Disp: , Rfl:     cilostazol  "(PLETAL) 50 mg tablet, Take 1 tablet (50 mg total) by mouth 2 (two) times a day, Disp: 60 tablet, Rfl: 2    clopidogrel (PLAVIX) 75 mg tablet, TAKE 1 TABLET DAILY, Disp: 90 tablet, Rfl: 1    finasteride (PROSCAR) 5 mg tablet, Take by mouth, Disp: , Rfl:     irbesartan (AVAPRO) 300 mg tablet, Take 1 tablet (300 mg total) by mouth daily, Disp: 90 tablet, Rfl: 3    magnesium oxide (MAG-OX) 400 mg tablet, Take 400 mg by mouth, Disp: , Rfl:     metoprolol succinate (TOPROL-XL) 25 mg 24 hr tablet, Take 25 mg by mouth daily, Disp: , Rfl:     nitroglycerin (NITROSTAT) 0.4 mg SL tablet, , Disp: , Rfl:     pantoprazole (PROTONIX) 40 mg tablet, TAKE 1 TABLET DAILY, Disp: 90 tablet, Rfl: 1    potassium chloride (MICRO-K) 10 MEQ CR capsule, TAKE 1 CAPSULE DAILY, Disp: 90 capsule, Rfl: 0    sotalol (BETAPACE) 120 mg tablet, TAKE 1 TABLET TWICE A DAY, Disp: 180 tablet, Rfl: 3    tamsulosin (FLOMAX) 0.4 mg, Take by mouth, Disp: , Rfl:     tolterodine (DETROL LA) 4 mg 24 hr capsule, , Disp: , Rfl:     acetaminophen (TYLENOL) 500 mg tablet, Take by mouth (Patient not taking: Reported on 10/31/2023), Disp: , Rfl:     mupirocin (BACTROBAN) 2 % ointment, , Disp: , Rfl:         Objective     /76 (BP Location: Left arm, Patient Position: Sitting, Cuff Size: Standard)   Pulse 100   Ht 6' 2\" (1.88 m)   Wt 112 kg (248 lb)   BMI 31.84 kg/m²     Physical Exam  Vitals and nursing note reviewed.   Constitutional:       Appearance: Normal appearance.   HENT:      Head: Normocephalic and atraumatic.   Neck:      Vascular: No carotid bruit.   Cardiovascular:      Rate and Rhythm: Normal rate and regular rhythm.      Pulses:           Carotid pulses are 2+ on the right side and 2+ on the left side.       Dorsalis pedis pulses are 0 on the right side and 0 on the left side.        Posterior tibial pulses are 0 on the right side and 0 on the left side.      Heart sounds: Normal heart sounds, S1 normal and S2 normal. No murmur heard.     " Comments: No carotid bruits   Pulmonary:      Effort: Pulmonary effort is normal. No respiratory distress.      Breath sounds: Normal breath sounds.   Abdominal:      General: Bowel sounds are normal.      Palpations: Abdomen is soft.      Comments: No pulsatile mass or abdominal bruits   Musculoskeletal:         General: No swelling. Normal range of motion.      Cervical back: Normal range of motion and neck supple.      Right lower leg: No edema.      Left lower leg: No edema.   Skin:     General: Skin is warm and dry.      Comments: L anterior knee abrasion with scab   Neurological:      General: No focal deficit present.      Mental Status: He is alert and oriented to person, place, and time.   Psychiatric:         Mood and Affect: Mood normal.         Behavior: Behavior normal.         Thought Content: Thought content normal.         Judgment: Judgment normal.         I have spent a total time of 25 minutes in caring for this patient on the day of the visit/encounter including Diagnostic results, Prognosis, Risks and benefits of tx options, Instructions for management, Patient and family education, Importance of tx compliance, Risk factor reductions, Impressions, Counseling / Coordination of care, Documenting in the medical record, Reviewing / ordering tests, medicine, procedures  , and Obtaining or reviewing history  .    Freya Winchester PA-C  [facility]  [phone]

## 2024-10-01 DIAGNOSIS — I48.0 PAF (PAROXYSMAL ATRIAL FIBRILLATION) (HCC): ICD-10-CM

## 2024-10-02 RX ORDER — APIXABAN 5 MG/1
TABLET, FILM COATED ORAL
Qty: 180 TABLET | Refills: 1 | Status: SHIPPED | OUTPATIENT
Start: 2024-10-02

## 2024-10-03 ENCOUNTER — TELEPHONE (OUTPATIENT)
Dept: CARDIOLOGY CLINIC | Facility: CLINIC | Age: 74
End: 2024-10-03

## 2024-10-03 NOTE — TELEPHONE ENCOUNTER
Pt called and said that his BP is dropping.  It is 122/76 this morning.  10/1  BP 80/51  and 89/50.  Pt stopped irbesartan yesterday.  Please call pt [phone]

## 2024-10-03 NOTE — TELEPHONE ENCOUNTER
Pt is aware. States that he was to have stopped the metoprolol and the amlodipine a few weeks ago. He is just making sure you want him to restart them.

## 2024-10-03 NOTE — TELEPHONE ENCOUNTER
Tell him to keep the irbesartan off as long as blood pressure is 120 or less.  If his blood pressure starts to go over 130/80, then he can restart the irbesartan but only half of the 300 mg tablet.  He can continue the metoprolol, the sotalol and the amlodipine for now.

## 2024-10-03 NOTE — TELEPHONE ENCOUNTER
Pt states that he started after his fall a month or so ago with his BP going low again. States that on 10/1 he felt lightheaded, other wise okay. He got two Bps in the 80s. Yesterday and today he did not take the ibersartan and his BP is now in the 120s 130s.     He is wondering if there is something that is causing it other then the med's. He is unsure why it happens all of the sudden

## 2024-10-03 NOTE — TELEPHONE ENCOUNTER
No, actually according to my last note he was not advised to stop the amlodipine and the metoprolol and he should be on them.  Only to stop the irbesartan for now.

## 2024-10-17 DIAGNOSIS — I10 HYPERTENSION, UNSPECIFIED TYPE: ICD-10-CM

## 2024-10-17 DIAGNOSIS — I10 PRIMARY HYPERTENSION: ICD-10-CM

## 2024-10-17 RX ORDER — IRBESARTAN 300 MG/1
300 TABLET ORAL DAILY
Qty: 90 TABLET | Refills: 1 | Status: SHIPPED | OUTPATIENT
Start: 2024-10-17

## 2024-10-17 RX ORDER — POTASSIUM CHLORIDE 750 MG/1
CAPSULE, EXTENDED RELEASE ORAL
Qty: 90 CAPSULE | Refills: 1 | Status: SHIPPED | OUTPATIENT
Start: 2024-10-17

## 2024-10-31 DIAGNOSIS — I73.9 PAD (PERIPHERAL ARTERY DISEASE) (HCC): ICD-10-CM

## 2024-10-31 RX ORDER — CILOSTAZOL 50 MG/1
50 TABLET ORAL 2 TIMES DAILY
Qty: 60 TABLET | Refills: 5 | Status: SHIPPED | OUTPATIENT
Start: 2024-10-31

## 2024-11-06 DIAGNOSIS — I10 ESSENTIAL HYPERTENSION: ICD-10-CM

## 2024-11-06 DIAGNOSIS — K21.9 GASTROESOPHAGEAL REFLUX DISEASE WITHOUT ESOPHAGITIS: ICD-10-CM

## 2024-11-06 RX ORDER — PANTOPRAZOLE SODIUM 40 MG/1
TABLET, DELAYED RELEASE ORAL
Qty: 90 TABLET | Refills: 1 | Status: SHIPPED | OUTPATIENT
Start: 2024-11-06

## 2024-11-25 ENCOUNTER — TELEPHONE (OUTPATIENT)
Age: 74
End: 2024-11-25

## 2024-11-25 DIAGNOSIS — I73.9 PAD (PERIPHERAL ARTERY DISEASE) (HCC): ICD-10-CM

## 2024-11-25 NOTE — TELEPHONE ENCOUNTER
Received call from Nitish at [facility] wanting to alert Dr Crowder to possible drug interaction between Sotalol and Pletal.  Drug interaction may cause cardiac arrhythmias.  Please review and advise if pt is continue with both medications.  Nitish can be reached directly at [phone].  Reference # [id number]

## 2024-11-25 NOTE — TELEPHONE ENCOUNTER
Pharmacy states no refills.     Reason for call:   [x] Refill   [] Prior Auth  [] Other:     Office:   [] PCP/Provider -   [x[facility]  Authorized By: Danial Crowder MD    Medication: cilostazol (PLETAL) 50 mg tablet     Dose/Frequency:  TAKE 1 TABLET (50 MG TOTAL) BY MOUTH 2 (TWO) TIMES A DAY     Quantity: 60 tablet     Pharmacy: [facility] [address]     Does the patient have enough for 3 days?   [x] Yes   [] No - Send as HP to POD

## 2024-11-26 RX ORDER — CILOSTAZOL 50 MG/1
50 TABLET ORAL 2 TIMES DAILY
Qty: 60 TABLET | Refills: 5 | Status: SHIPPED | OUTPATIENT
Start: 2024-11-26

## 2024-11-26 NOTE — TELEPHONE ENCOUNTER
If his blood pressure is staying on the low side, he can hold the amlodipine until it starts to come back up at at least 130/80.

## 2024-11-26 NOTE — TELEPHONE ENCOUNTER
Please let him know to stop the Pletal that I started him on since there is an interaction between sotalol and Pletal.

## 2024-11-26 NOTE — TELEPHONE ENCOUNTER
Pharmacy and patient are aware.    Pt states that he has been having issues with a low BP and he feels lightheaded when this happens. States that it has been in the 105/70 area. Not all of the time

## 2024-12-06 DIAGNOSIS — I10 HTN (HYPERTENSION), BENIGN: ICD-10-CM

## 2024-12-06 DIAGNOSIS — I25.10 CORONARY ARTERY DISEASE INVOLVING NATIVE CORONARY ARTERY OF NATIVE HEART WITHOUT ANGINA PECTORIS: ICD-10-CM

## 2024-12-06 RX ORDER — AMLODIPINE BESYLATE 5 MG/1
5 TABLET ORAL DAILY
Qty: 90 TABLET | Refills: 1 | Status: SHIPPED | OUTPATIENT
Start: 2024-12-06 | End: 2024-12-12 | Stop reason: SINTOL

## 2024-12-06 RX ORDER — CLOPIDOGREL BISULFATE 75 MG/1
75 TABLET ORAL DAILY
Qty: 90 TABLET | Refills: 1 | Status: SHIPPED | OUTPATIENT
Start: 2024-12-06

## 2024-12-11 NOTE — PROGRESS NOTES
Cardiology Follow Up    Dorian Restrepo  1950  [id number]  [facility] [place] [address]  [address]  [facility] [address] [phone]    Dorian presents for evaluation of blood pressure and bruising.    1. Coronary artery disease involving native coronary artery of native heart without angina pectoris  Assessment & Plan:  Coronary artery disease:  A.  NSTEMI 10/01/2008  B.  2 Taxus DEIRDRE to the RCA 10/01/2008  C.  Moderate left main disease on cardiac catheterization 2008, 2012 and 2014  D.  Cardiac catheterization 06/29/2016 with FFR less than 0.75 with left main 50% stenotic  E.  CABG x 2. LIMA-LAD. SVG-OM 7/29/16.  F.  Non ischemic Lexiscan nuclear stress test 9/2/2021  G. Non ischemic Lexiscan nuclear stress test 12/28/22 at [facility].  - - - - - - -  Patient denies any cardiac symptoms at present.  He denies chest pain or change in baseline dyspnea on exertion.   He is currently on clopidogrel and eliquis. No aspirin.   Continue atorvastatin 40 mg daily for goal LDL < 70.   We reviewed urgent s/s to report and when to seek immediate medical attention.  Orders:  -     POCT ECG  -     Echo complete w/ contrast if indicated; Future; Expected date: 12/12/2024  2. PAF (paroxysmal atrial fibrillation) (formerly [facility])  Assessment & Plan:  Patient remains in normal sinus rhythm on Sotalol 120 mg twice daily and metoprolol succinate 25 mg daily.   Remains on Eliquis 5 mg BID  ECG today shows sinus rhythm with 1st degree AV block.  [facility] Dr[facility] Hartmann.  Orders:  -     POCT ECG  3. Peripheral vascular disease (formerly [facility])  Assessment & Plan:  Known bilateral PAD.  Pletal discontinued due to bleeding and interaction with sotalol.  Continues annual surveillance with [facility] vascular surgery.  Remains on Plavix and Eliquis as well as high intensity statin  He has been counseled on S/S to report.  Orders:  -     POCT ECG  4. Carotid occlusion,  right  Assessment & Plan:  Known R ICA occlusion and mild L ICA stenosis.  Now following with [facility] V[facility] surgery with no surgical intervention advised at this time. Patient has been counseled on S/S to report.  Continue high intensity statin.  Will avoid hypotension.  Not on aspirin in lieu of Plavix and Eliquis.  5. Non-rheumatic mitral regurgitation  Assessment & Plan:  Echocardiogram 12/22/16 showed mild to moderate central mitral regurgitation.   Repeat echocardiogram 02/17/2020 showed mild central mitral regurgitation.  Echocardiogram 9/20/2022 showed trace to mild MR.  Orders:  -     POCT ECG  -     Echo complete w/ contrast if indicated; Future; Expected date: 12/12/2024  6. Primary hypertension  Assessment & Plan:  Recent intermittent hypotension at home.  BP is borderline elevated today in the office.  Amlodipine was discontinued and irbesartan reduced to 150 mg daily.  Continue metoprolol succinate 25 mg daily, sotalol 120 mg BID.   I have requested he monitor at home and bring his home BP cuff to his next visit. Report readings persistently > 150/80.  Reviewed benefits of 2 g sodium diet, avoid caffeine, weight control, regular exercise.  Orders:  -     POCT ECG  -     irbesartan (AVAPRO) 300 mg tablet; Take 0.5 tablets (150 mg total) by mouth daily  -     Echo complete w/ contrast if indicated; Future; Expected date: 12/12/2024  7. Mixed hyperlipidemia  Assessment & Plan:  Lipid panel 10/31/2024: C 114. T 122. H 38. L 52.  Atorvastatin 40 mg.    Goal LDL < 70.   Continue current regimen and repeat lipid panel fall 2025    Orders:  -     POCT ECG  8. Stage 3a chronic kidney disease (HCC)  Assessment & Plan:  Lab Results   Component Value Date    EGFR 83 10/31/2024    EGFR 88 09/12/2024    EGFR 89 10/27/2023    CREATININE 0.95 10/31/2024    CREATININE 0.89 09/12/2024    CREATININE 0.90 10/27/2023     Renal function remains stable on recent blood work.  9. Obesity (BMI 30-39.9)  Assessment &  Plan:  Body mass index is 32.12 kg/m².   Discussed the importance of resuming exercise program and trying to maintain a healthy diet.      ANAHY Alarcon has a past medical history of coronary artery disease dating back to 10/2008 when he had DEIRDRE x 2 placed to an occluded right coronary artery. He ultimately underwent CABG x 2 with LIMA-LAD and SVG-OM 7/29/16. He had post operative atrial fibrillation and follows with Dr. Chang at [facility] for EP.  He is on sotalol. He has known carotid occlusion for which he was following with Dr. Cintron (vascular surgery).      Updated echocardiogram 9/20/2022 showed EF 60% with moderate LVH, mildly reduced RV function, trace-mild MR, mild TR with mildly elevated pulmonary artery pressure.      He followed up most recently with Dr. Crowder 8/20/2024 at which time he was complaining of mild numbness in his toes. An arterial duplex was ordered which showed diffuse, moderate disease. He was started on Pletal.     12/12/2024: Dorian presents today at his request for evaluation of his blood pressure. He has noticed his BP has been dropping into the low 100's. He has stopped amlodipine all together and reduced irbesartan to 150 mg daily. He states this has helped. BP is ranging from 100-140's systolic. He was also noticing that he was bruising very easily. He was started on Pletal this summer due to complaint of cramping in his toes when wearing shoes. This improved when he removed his shoes. He did not notice a significant improvement with the Pletal. His pharmacy reached out to our office due to concerns for interaction between Pletal and Sotalol. He was instructed to stop the Pletal. His bruising has improved since then. He has been working with [facility] urology for BPH and undergone several medication adjustments for urinary frequency. Now on Flomax, Proscar, and Detrol. He continues to follow with vascular surgery annually. He is also following with [facility] EP yearly. ECG today  shows SR with 1st degree AV block.     Medical Problems       Problem List       CAD (coronary artery disease)    S/P CABG (coronary artery bypass graft)    Carotid occlusion, right    Peripheral vascular disease (HCC)    PAF (paroxysmal atrial fibrillation) (HCC)    Non-rheumatic mitral regurgitation    Hypertension    Hyperlipidemia    Stage 3a chronic kidney disease (HCC)    Obesity (BMI 30-39.9)    Dyspnea on exertion    Benign prostatic hyperplasia with lower urinary tract symptoms    Numbness in feet    Claudication (HCC)        Past Medical History:   Diagnosis Date    Atrial fibrillation (HCC)     Carotid occlusion, right     Coronary artery disease     GERD (gastroesophageal reflux disease)     Hyperlipidemia     Hypertension     Myocardial infarct ([facility])      Social History     Socioeconomic History    Marital status: /Civil Union     Spouse name: Not on file    Number of children: Not on file    Years of education: Not on file    Highest education level: Not on file   Occupational History    Occupation: Retired   Tobacco Use    Smoking status: Former    Smokeless tobacco: Never   Vaping Use    Vaping status: Never Used   Substance and Sexual Activity    Alcohol use: Yes     Alcohol/week: 1.0 standard drink of alcohol     Types: 1 Cans of beer per week     Comment: rarely    Drug use: Never    Sexual activity: Not on file     Comment: Defer   Other Topics Concern    Not on file   Social History Narrative    Not on file     Social Drivers of Health     Financial Resource Strain: Low Risk  (10/30/2024)    Received from [facility]    Overall Financial Resource Strain (CARDIA)     Difficulty of Paying Living Expenses: Not hard at all   Food Insecurity: No Food Insecurity (10/30/2024)    Received from [facility]    Hunger Vital Sign     Worried About Running Out of Food in the Last Year: Never true     Ran Out of Food in the Last Year: Never true   Transportation Needs:  No Transportation Needs (10/30/2024)    Received from [facility]    PRAPARE - Transportation     Lack of Transportation (Medical): No     Lack of Transportation (Non-Medical): No   Physical Activity: Not on file   Stress: No Stress Concern Present (10/30/2024)    Received from [facility]    [employer] - Occupational Stress Questionnaire     Feeling of Stress : Not at all   Social Connections: Feeling Socially Integrated (10/30/2024)    Received from [facility]    OASIS : Social Isolation     How often do you feel lonely or isolated from those around you?: Never   Intimate Partner Violence: Not At Risk (10/30/2024)    Received from [facility]    Humiliation, Afraid, Rape, and Kick questionnaire     Fear of Current or Ex-Partner: No     Emotionally Abused: No     Physically Abused: No     Sexually Abused: No   Housing Stability: Unknown (10/30/2024)    Received from [facility]    Housing Stability Vital Sign     Unable to Pay for Housing in the Last Year: No     Number of Times Moved in the Last Year: Not on file     Homeless in the Last Year: No      Family History   Problem Relation Age of Onset    Heart disease Mother     Heart failure Father      Past Surgical History:   Procedure Laterality Date    CARDIAC CATHETERIZATION  10/01/2008    RCA with 100% subacute occlusion. DEIRDRE x2 to RCA. Ostial left main disease with FFR 0.78.     CARDIAC CATHETERIZATION  03/16/2012    Moderate 50% ostial left main. FFR 0.82. Distal RCA 40%. No intervention.     CARDIAC CATHETERIZATION  11/24/2014    Ostial left main 60%. Ostial left circumflex 70%.     CARDIAC CATHETERIZATION  06/29/2016    Left main 50%. FFR was less than 0.75. CABG recommended.     CARDIOVERSION  07/01/2017    CEREBRAL ANGIOGRAM  12/12/2013    Carotid angiography. Left internal carotid artery 40%. Right internal carotid artery 100%  occlusion.     CHOLECYSTECTOMY      CORONARY ARTERY BYPASS GRAFT  07/29/2016    LIMA-LAD. SVG-OM.     LUMBAR DISC SURGERY      TONSILLECTOMY         Current Outpatient Medications:     allopurinol (ZYLOPRIM) 300 mg tablet, Take 300 mg by mouth daily , Disp: , Rfl:     apixaban (Eliquis) 5 mg, TAKE 1 TABLET EVERY 12 HOURS, Disp: 180 tablet, Rfl: 1    atorvastatin (LIPITOR) 40 mg tablet, TAKE 1 TABLET DAILY, Disp: 90 tablet, Rfl: 3    Cholecalciferol (VITAMIN D3 HIGH POTENCY PO), Take by mouth, Disp: , Rfl:     clopidogrel (PLAVIX) 75 mg tablet, TAKE 1 TABLET DAILY, Disp: 90 tablet, Rfl: 1    finasteride (PROSCAR) 5 mg tablet, Take by mouth, Disp: , Rfl:     irbesartan (AVAPRO) 300 mg tablet, Take 0.5 tablets (150 mg total) by mouth daily, Disp: 90 tablet, Rfl: 1    magnesium oxide (MAG-OX) 400 mg tablet, Take 400 mg by mouth, Disp: , Rfl:     metoprolol succinate (TOPROL-XL) 25 mg 24 hr tablet, Take 25 mg by mouth daily, Disp: , Rfl:     nitroglycerin (NITROSTAT) 0.4 mg SL tablet, , Disp: , Rfl:     pantoprazole (PROTONIX) 40 mg tablet, TAKE 1 TABLET DAILY, Disp: 90 tablet, Rfl: 1    potassium chloride (MICRO-K) 10 MEQ CR capsule, TAKE 1 CAPSULE DAILY, Disp: 90 capsule, Rfl: 1    sotalol (BETAPACE) 120 mg tablet, TAKE 1 TABLET TWICE A DAY, Disp: 180 tablet, Rfl: 3    tamsulosin (FLOMAX) 0.4 mg, Take by mouth, Disp: , Rfl:     tolterodine (DETROL LA) 4 mg 24 hr capsule, , Disp: , Rfl:     acetaminophen (TYLENOL) 500 mg tablet, Take by mouth (Patient not taking: Reported on 10/31/2023), Disp: , Rfl:   No Known Allergies    Labs:     Chemistry        Component Value Date/Time    K 4.8 10/31/2024 0806     10/31/2024 0806    CO2 26 10/31/2024 0806    BUN 21 10/31/2024 0806    CREATININE 0.95 10/31/2024 0806        Component Value Date/Time    CALCIUM 9.1 10/31/2024 0806    ALKPHOS 72 10/31/2024 0806    AST 14 10/31/2024 0806    ALT 11 10/31/2024 0806        Lipid panel 10/31/2024: C 114. T 122. H 38. L 52.      Cardiac testing:  ECG 12/12/2024: Sinus rhythm with sinus arrhythmia and 1st degree AV block. Rate 76 bpm.     14 day ZIO monitor ([facility]) 1/2023:  Predominantly sinus rhythm, HR , avg 76 bpm. 10.8% PAC burden, < 1% PVC burden. No a fib or flutter detected.  80 SVT runs, longest 33.2 seconds. 5 runs of NSVT, longest 8 beats.    ECG 9/9/2022: Normal sinus rhythm. Normal ECG.      Echocardiogram 8/25/2022:  EF 55-60%. Mod LVH. Low normal to mildly reduced RV function.  Biatrial dilation.  Trace-mild MR. Mild TR. Mildly increased PASP.   Mildly dilated aortic root. Ascending aorta normal size.      Lipid panel 4/5/2022: C 123. T 101. H 41. L 62.      Carotid Duplex 12/10/2021: No report available (will try to obtain).      Lexiscan nuclear stress test 9/2/2021:   Fernando protocol attempted. Transitioned to Lexiscan as heart rate blunted. No evidence of ischemia. Fixed inferior defect in RCA territory. EF 52%.      Lipid panel 3/2/2021: C 131. T 142. H 37. L 66.      Carotid Duplex 11/23/2020: JAY known 100% occlusion. 20-49% LICA stenosis. No change from prior study.      Echocardiogram 2/17/2020:  Mildly dilated left ventricle with normal left ventricular systolic function.  EF estimated at 60-65%.  Mild concentric left ventricular hypertrophy.  Abnormal septal motion likely due to known cardiac surgery.  Mild diastolic dysfunction.  Moderately dilated right ventricle with normal right ventricular systolic function.  Mild left and right atrial enlargement.  Aortic sclerosis without stenosis.  Mitral annular calcification. Mild central mitral regurgitation.  Mild tricuspid regurgitation. Estimated PASP 30 mmHg.     Cv Stress Nuclear Pharm: Result Date: 5/4/2018  No evidence of ECG changes to suggest ischemia. · The patient reported shortness of breath during the stress test. · Blood pressure demonstrated a hypotensive response to stress. · There is a small to moderate sized non-reversible (scar/infarct) defect  "of mild severity present in the basal anterior and basal inferior wall(s), mostly normalized with CT correction, consistent with diaphragmatic attenuation. · Left ventricular perfusion is probably normal. No ischemia · This is a low risk study.     Arterial duplex 11/06/2013:  No abdominal aortic aneurysm.  No significant aortoiliac disease.    Stress echocardiogram 6/02/2016:  Fernando. 7:42 seconds.  8.3 METs.  Hypotension with exercise.  66% of maximum predicted heart rate.  Frequent PACs and PVCs.  Atrial tachycardia.  No ischemia at submaximal heart rate.     Carotid duplex 06/05/2017:  Occluded right internal carotid artery. 50 to 69% left internal carotid artery stenosis.   Twelve month follow-up recommended.     Echocardiogram 12/22/2016:  Mildly dilated LV.  EF 55%.  Abnormal septal motion.  Mild concentric left ventricular hypertrophy.  Mildly enlarged RV with normal RV systolic function.  Mild right atrial enlargement/left atrial enlargement.  Aortic sclerosis.  No AI.  MAC.  Mild to moderate central MR.  Mild TR.  Top-normal PASP 39 mmHg.    Review of Systems   Constitutional: Negative.   HENT: Negative.     Cardiovascular:  Negative for chest pain, dyspnea on exertion, irregular heartbeat, leg swelling, near-syncope, orthopnea and palpitations.   Respiratory:  Negative for cough and snoring.    Endocrine: Negative.    Hematologic/Lymphatic: Bruises/bleeds easily.   Skin: Negative.    Musculoskeletal: Negative.    Gastrointestinal: Negative.    Genitourinary: Negative.    Neurological: Negative.    Psychiatric/Behavioral: Negative.         Vitals:    12/12/24 1530   BP: 146/82   Pulse: 78   Temp: 98.5 °F (36.9 °C)   SpO2: 97%     Vitals:    12/12/24 1530   Weight: 113 kg (250 lb 3.2 oz)     Height: 6' 2\" (188 cm)   Body mass index is 32.12 kg/m².    Physical Exam  Vitals and nursing note reviewed.   Constitutional:       General: He is not in acute distress.     Appearance: He is well-developed. He is " obese. He is not diaphoretic.   HENT:      Head: Normocephalic and atraumatic.   Neck:      Vascular: No carotid bruit or JVD.   Cardiovascular:      Rate and Rhythm: Normal rate and regular rhythm. No extrasystoles are present.     Pulses: Intact distal pulses.      Heart sounds: Normal heart sounds, S1 normal and S2 normal. No murmur heard.     No friction rub. No gallop.      Comments: Trivial ankle edema  Pulmonary:      Effort: Pulmonary effort is normal. No respiratory distress.      Breath sounds: Normal breath sounds.   Abdominal:      General: There is no distension.      Palpations: Abdomen is soft.      Tenderness: There is no abdominal tenderness.   Skin:     General: Skin is warm and dry.      Findings: No rash.   Neurological:      Mental Status: He is alert and oriented to person, place, and time.   Psychiatric:         Behavior: Behavior normal.

## 2024-12-12 ENCOUNTER — OFFICE VISIT (OUTPATIENT)
Dept: CARDIOLOGY CLINIC | Facility: CLINIC | Age: 74
End: 2024-12-12
Payer: MEDICARE

## 2024-12-12 VITALS
HEIGHT: 74 IN | BODY MASS INDEX: 32.11 KG/M2 | WEIGHT: 250.2 LBS | TEMPERATURE: 98.5 F | DIASTOLIC BLOOD PRESSURE: 82 MMHG | OXYGEN SATURATION: 97 % | SYSTOLIC BLOOD PRESSURE: 146 MMHG | HEART RATE: 78 BPM

## 2024-12-12 DIAGNOSIS — E78.2 MIXED HYPERLIPIDEMIA: ICD-10-CM

## 2024-12-12 DIAGNOSIS — E66.9 OBESITY (BMI 30-39.9): ICD-10-CM

## 2024-12-12 DIAGNOSIS — I25.10 CORONARY ARTERY DISEASE INVOLVING NATIVE CORONARY ARTERY OF NATIVE HEART WITHOUT ANGINA PECTORIS: Primary | ICD-10-CM

## 2024-12-12 DIAGNOSIS — I34.0 NON-RHEUMATIC MITRAL REGURGITATION: ICD-10-CM

## 2024-12-12 DIAGNOSIS — I10 PRIMARY HYPERTENSION: ICD-10-CM

## 2024-12-12 DIAGNOSIS — N18.31 STAGE 3A CHRONIC KIDNEY DISEASE (HCC): ICD-10-CM

## 2024-12-12 DIAGNOSIS — I65.21 CAROTID OCCLUSION, RIGHT: ICD-10-CM

## 2024-12-12 DIAGNOSIS — I73.9 PERIPHERAL VASCULAR DISEASE (HCC): ICD-10-CM

## 2024-12-12 DIAGNOSIS — I48.0 PAF (PAROXYSMAL ATRIAL FIBRILLATION) (HCC): ICD-10-CM

## 2024-12-12 PROCEDURE — 93000 ELECTROCARDIOGRAM COMPLETE: CPT | Performed by: NURSE PRACTITIONER

## 2024-12-12 PROCEDURE — 99214 OFFICE O/P EST MOD 30 MIN: CPT | Performed by: NURSE PRACTITIONER

## 2024-12-12 RX ORDER — IRBESARTAN 300 MG/1
150 TABLET ORAL DAILY
Qty: 90 TABLET | Refills: 1 | Status: SHIPPED | OUTPATIENT
Start: 2024-12-12

## 2024-12-12 NOTE — ASSESSMENT & PLAN NOTE
Recent intermittent hypotension at home.  BP is borderline elevated today in the office.  Amlodipine was discontinued and irbesartan reduced to 150 mg daily.  Continue metoprolol succinate 25 mg daily, sotalol 120 mg BID.   I have requested he monitor at home and bring his home BP cuff to his next visit. Report readings persistently > 150/80.  Reviewed benefits of 2 g sodium diet, avoid caffeine, weight control, regular exercise.

## 2024-12-12 NOTE — ASSESSMENT & PLAN NOTE
Lipid panel 10/31/2024: C 114. T 122. H 38. L 52.  Atorvastatin 40 mg.    Goal LDL < 70.   Continue current regimen and repeat lipid panel fall 2025

## 2024-12-12 NOTE — ASSESSMENT & PLAN NOTE
Lab Results   Component Value Date    EGFR 83 10/31/2024    EGFR 88 09/12/2024    EGFR 89 10/27/2023    CREATININE 0.95 10/31/2024    CREATININE 0.89 09/12/2024    CREATININE 0.90 10/27/2023     Renal function remains stable on recent blood work.

## 2024-12-12 NOTE — PATIENT INSTRUCTIONS
Stay off amlodipine. Your prostate medication is also lowering blood pressure.  Stay off Pletal.  Continue irbesartan at 150 mg (1/2 tab) daily.  Your blood pressure is top normal today. Continue to monitor periodically and notify our office if persistently > 150/80.  Plavix and Eliquis will make you bleed and bruise easily. Report any concerning bleeding.   Update echo  Add metamucil powder to help regulate stools.

## 2024-12-12 NOTE — ASSESSMENT & PLAN NOTE
Body mass index is 32.12 kg/m².   Discussed the importance of resuming exercise program and trying to maintain a healthy diet.

## 2024-12-12 NOTE — ASSESSMENT & PLAN NOTE
Coronary artery disease:  A.  NSTEMI 10/01/2008  B.  2 Taxus DEIRDRE to the RCA 10/01/2008  C.  Moderate left main disease on cardiac catheterization 2008, 2012 and 2014  D.  Cardiac catheterization 06/29/2016 with FFR less than 0.75 with left main 50% stenotic  E.  CABG x 2. LIMA-LAD. SVG-OM 7/29/16.  F.  Non ischemic Lexiscan nuclear stress test 9/2/2021  G. Non ischemic Lexiscan nuclear stress test 12/28/22 at [facility].  - - - - - - -  Patient denies any cardiac symptoms at present.  He denies chest pain or change in baseline dyspnea on exertion.   He is currently on clopidogrel and eliquis. No aspirin.   Continue atorvastatin 40 mg daily for goal LDL < 70.   We reviewed urgent s/s to report and when to seek immediate medical attention.

## 2024-12-12 NOTE — ASSESSMENT & PLAN NOTE
Known R ICA occlusion and mild L ICA stenosis.  Now following with [facility] Vascular surgery with no surgical intervention advised at this time. Patient has been counseled on S/S to report.  Continue high intensity statin.  Will avoid hypotension.  Not on aspirin in lieu of Plavix and Eliquis.

## 2024-12-12 NOTE — ASSESSMENT & PLAN NOTE
Known bilateral PAD.  Pletal discontinued due to bleeding and interaction with sotalol.  Continues annual surveillance with [facility] vascular surgery.  Remains on Plavix and Eliquis as well as high intensity statin  He has been counseled on S/S to report.

## 2024-12-12 NOTE — ASSESSMENT & PLAN NOTE
Patient remains in normal sinus rhythm on Sotalol 120 mg twice daily and metoprolol succinate 25 mg daily.   Remains on Eliquis 5 mg BID  ECG today shows sinus rhythm with 1st degree AV block.  Following yearly with EP at [facility] Dr. Hartmann.

## 2024-12-12 NOTE — ASSESSMENT & PLAN NOTE
Echocardiogram 12/22/16 showed mild to moderate central mitral regurgitation.   Repeat echocardiogram 02/17/2020 showed mild central mitral regurgitation.  Echocardiogram 9/20/2022 showed trace to mild MR.

## 2024-12-16 DIAGNOSIS — I25.10 CORONARY ARTERY DISEASE INVOLVING NATIVE CORONARY ARTERY OF NATIVE HEART WITHOUT ANGINA PECTORIS: ICD-10-CM

## 2024-12-16 DIAGNOSIS — E78.2 MIXED HYPERLIPIDEMIA: ICD-10-CM

## 2024-12-17 RX ORDER — ATORVASTATIN CALCIUM 40 MG/1
40 TABLET, FILM COATED ORAL DAILY
Qty: 90 TABLET | Refills: 1 | Status: SHIPPED | OUTPATIENT
Start: 2024-12-17

## 2024-12-20 ENCOUNTER — HOSPITAL ENCOUNTER (OUTPATIENT)
Dept: NON INVASIVE DIAGNOSTICS | Facility: HOSPITAL | Age: 74
Discharge: HOME | End: 2024-12-20
Payer: MEDICARE

## 2024-12-20 VITALS
HEIGHT: 74 IN | BODY MASS INDEX: 32.08 KG/M2 | HEART RATE: 65 BPM | SYSTOLIC BLOOD PRESSURE: 146 MMHG | WEIGHT: 250 LBS | DIASTOLIC BLOOD PRESSURE: 82 MMHG

## 2024-12-20 DIAGNOSIS — I25.10 CORONARY ARTERY DISEASE INVOLVING NATIVE CORONARY ARTERY OF NATIVE HEART WITHOUT ANGINA PECTORIS: ICD-10-CM

## 2024-12-20 DIAGNOSIS — I34.0 NON-RHEUMATIC MITRAL REGURGITATION: ICD-10-CM

## 2024-12-20 DIAGNOSIS — I10 PRIMARY HYPERTENSION: ICD-10-CM

## 2024-12-20 PROCEDURE — 93306 TTE W/DOPPLER COMPLETE: CPT

## 2024-12-21 LAB
AORTIC ROOT: 3.3 CM
AORTIC VALVE MEAN VELOCITY: 8.3 M/S
APICAL FOUR CHAMBER EJECTION FRACTION: 64 %
AV AREA BY CONTINUOUS VTI: 3 CM2
AV AREA PEAK VELOCITY: 3.4 CM2
AV LVOT MEAN GRADIENT: 2 MMHG
AV LVOT PEAK GRADIENT: 4 MMHG
AV MEAN GRADIENT: 3 MMHG
AV PEAK GRADIENT: 5 MMHG
AV VALVE AREA: 3.01 CM2
AV VELOCITY RATIO: 0.82
BSA FOR ECHO PROCEDURE: 2.39 M2
DOP CALC AO PEAK VEL: 1.15 M/S
DOP CALC AO VTI: 24.61 CM
DOP CALC LVOT AREA: 4.15 CM2
DOP CALC LVOT CARDIAC INDEX: 2.45 L/MIN/M2
DOP CALC LVOT CARDIAC OUTPUT: 5.86 L/MIN
DOP CALC LVOT DIAMETER: 2.3 CM
DOP CALC LVOT PEAK VEL VTI: 17.85 CM
DOP CALC LVOT PEAK VEL: 0.94 M/S
DOP CALC LVOT STROKE INDEX: 30.1 ML/M2
DOP CALC LVOT STROKE VOLUME: 74.12
E WAVE DECELERATION TIME: 180 MS
E/A RATIO: 2.22
FRACTIONAL SHORTENING: 29 (ref 28–44)
INTERVENTRICULAR SEPTUM IN DIASTOLE (PARASTERNAL SHORT AXIS VIEW): 1.4 CM
INTERVENTRICULAR SEPTUM: 1.4 CM (ref 0.6–1.1)
LAAS-AP2: 29.3 CM2
LAAS-AP4: 31.2 CM2
LEFT ATRIUM AREA SYSTOLE SINGLE PLANE A4C: 31 CM2
LEFT ATRIUM SIZE: 5 CM
LEFT ATRIUM VOLUME (MOD BIPLANE): 125 ML
LEFT ATRIUM VOLUME INDEX (MOD BIPLANE): 52.3 ML/M2
LEFT INTERNAL DIMENSION IN SYSTOLE: 3.6 CM (ref 2.1–4)
LEFT VENTRICLE DIASTOLIC VOLUME (MOD BIPLANE): 129 ML
LEFT VENTRICLE DIASTOLIC VOLUME INDEX (MOD BIPLANE): 54 ML/M2
LEFT VENTRICLE SYSTOLIC VOLUME (MOD BIPLANE): 48 ML
LEFT VENTRICLE SYSTOLIC VOLUME INDEX (MOD BIPLANE): 20.1 ML/M2
LEFT VENTRICULAR INTERNAL DIMENSION IN DIASTOLE: 5.1 CM (ref 3.5–6)
LEFT VENTRICULAR POSTERIOR WALL IN END DIASTOLE: 1.3 CM
LEFT VENTRICULAR STROKE VOLUME: 66 ML
LV EF: 63 %
LVSV (TEICH): 66 ML
MV E'TISSUE VEL-LAT: 11 CM/S
MV E'TISSUE VEL-SEP: 7 CM/S
MV PEAK A VEL: 0.41 M/S
MV PEAK E VEL: 91 CM/S
MV STENOSIS PRESSURE HALF TIME: 52 MS
MV VALVE AREA P 1/2 METHOD: 4.23
RIGHT ATRIUM AREA SYSTOLE A4C: 28.6 CM2
RIGHT VENTRICLE ID DIMENSION: 5.3 CM
SL CV LEFT ATRIUM LENGTH A2C: 5.9 CM
SL CV PED ECHO LEFT VENTRICLE DIASTOLIC VOLUME (MOD BIPLANE) 2D: 122 ML
SL CV PED ECHO LEFT VENTRICLE SYSTOLIC VOLUME (MOD BIPLANE) 2D: 56 ML
TR MAX PG: 43 MMHG
TR PEAK VELOCITY: 3.3 M/S
TRICUSPID ANNULAR PLANE SYSTOLIC EXCURSION: 1.9 CM
TRICUSPID VALVE PEAK REGURGITATION VELOCITY: 3.29 M/S

## 2024-12-21 PROCEDURE — 93306 TTE W/DOPPLER COMPLETE: CPT | Performed by: INTERNAL MEDICINE

## 2024-12-23 ENCOUNTER — RESULTS FOLLOW-UP (OUTPATIENT)
Dept: CARDIOLOGY CLINIC | Facility: CLINIC | Age: 74
End: 2024-12-23

## 2024-12-23 NOTE — TELEPHONE ENCOUNTER
----- Message from KATHRIN Mary sent at 12/23/2024 12:46 PM EST -----  Please let Dorian know that his echocardiogram shows no significant changes when compared to his 2022 echo. I will review in detail with him at his next visit.  Thank you!

## 2025-01-22 NOTE — TELEPHONE ENCOUNTER
Subjective:      Patient ID: Rosalia Mccauley is a 45 y.o. female.    Vitals:  vitals were not taken for this visit.     Chief Complaint: Cough      Visit Type: TELE AUDIOVISUAL    Present with the patient at the time of consultation: TELEMED PRESENT WITH PATIENT: None at home    Past Medical History:   Diagnosis Date    Hirsuties 2012    Irregular menstrual cycle 2012    Leiomyoma of uterus, unspecified 2012    Obesity     Polycystic ovaries 2012    Seasonal allergic rhinitis 2020    FILOMENA (stress urinary incontinence, female) 2019    Type 2 diabetes mellitus without complication 2016     Past Surgical History:   Procedure Laterality Date    COLONOSCOPY N/A 2023    Procedure: COLONOSCOPY;  Surgeon: Frankie Brown MD;  Location: [address]);  Service: Endoscopy;  Laterality: N/A;  instr via portal - PC  Precall done. 0830 arrival time confirmed. SG    HYSTERECTOMY      Partial Hysterectomy    PARTIAL HYSTERECTOMY  2023     Review of patient's allergies indicates:  No Known Allergies  Current Outpatient Medications on File Prior to Visit   Medication Sig Dispense Refill    dicyclomine (BENTYL) 10 MG capsule TAKE 1 CAPSULE BY MOUTH 4 TIMES DAILY BEFORE MEALS AND NIGHTLY. 360 capsule 1    diphenhydramine HCl (BENADRYL ALLERGY ORAL) Take by mouth.      gabapentin (NEURONTIN) 100 MG capsule 1-2 tabs po qhs prn pain 90 capsule 1    ibuprofen (ADVIL,MOTRIN) 800 MG tablet Take 1 tablet (800 mg total) by mouth 3 (three) times daily as needed for Pain. 30 tablet 1    lancets Misc 1 each by Misc.(Non-Drug; Combo Route) route 2 (two) times a day. 200 each 3    ondansetron (ZOFRAN-ODT) 4 MG TbDL Take 1 tablet (4 mg total) by mouth 2 (two) times daily as needed (for nausea). 30 tablet 0    oxyCODONE (ROXICODONE) 5 MG immediate release tablet       sumatriptan (IMITREX) 50 MG tablet Take st start of headache, repeat in 2 hours If needed 10 tablet 2    []  Dr Bhavik Salazar advised that it was ok to use the 9/9 OV to approve surgery  OK for them to send paperwork that is needed    Thank you tirzepatide (MOUNJARO) 7.5 mg/0.5 mL PnIj Inject 7.5 mg into the skin every 7 days. for 4 doses 4 Pen 0    triamcinolone acetonide 0.1% (KENALOG) 0.1 % cream Apply topically 2 (two) times daily. Apply to affected area 15 g 1     No current facility-administered medications on file prior to visit.     Family History   Problem Relation Name Age of Onset    Cancer Mother Petty Briones         Breast Cancer -Cancer Free since     Diabetes Mother Petty Briones     Hypertension Mother Petty Briones     Breast cancer Mother Petty Briones     Arthritis Mother Petty Briones     Heart disease Mother Petty Briones     Kidney disease Mother Petty Briones     Cancer Father Erendira Briones Jr         Lung Cancer()    Diabetes Sister Caroline Briones     Diabetes Sister Gina Lara         Just found out 3 mos ago    Diabetes Sister Lanny Cook     Diabetes Brother John Briones     Early death Brother Erendira Briones 111         11&1/2 mos old when he  from Pneumonia       Medications Ordered                [id number] - [place] - [address]   [address] [id number]    Telephone: [phone]   Fax: [phone]   Hours: Not open 24 hours                         E-Prescribed (4 of 4)              albuterol (VENTOLIN HFA) 90 mcg/actuation inhaler    Sig: Inhale 2 puffs into the lungs every 6 (six) hours as needed for Wheezing. Rescue       Start: 25     Quantity: 18 g Refills: 0                         amoxicillin (AMOXIL) 500 MG capsule    Sig: Take 1 capsule (500 mg total) by mouth every 8 (eight) hours. for 7 days       Start: 25     Quantity: 21 capsule Refills: 0                         predniSONE (DELTASONE) 10 MG tablet    Sig: Take 1 tablet (10 mg total) by mouth once daily. for 5 days       Start: 25     Quantity: 5 tablet Refills: 0                         promethazine-dextromethorphan (PROMETHAZINE-DM) 6.25-15 mg/5 mL Syrp    Sig: Take 5  mLs by mouth every 6 (six) hours as needed (cough).       Start: 1/22/25     Quantity: 100 mL Refills: 0                           Ohs Peq Odvv Intake    1/22/2025  7:58 AM CST - Filed by Patient   What is your current physical address in the event of a medical emergency? [address] ,[facility] [address]   Are you able to take your vital signs? No   Please attach any relevant images or files    Is your employer contracted with [facility]? No         Cough  This is a new problem. Episode onset: 5 days. The problem has been unchanged. The problem occurs every few minutes. The cough is Non-productive. Associated symptoms include nasal congestion, postnasal drip, shortness of breath and wheezing. Pertinent negatives include no fever, myalgias or rash. Treatments tried: mucinex, robitussin. The treatment provided mild relief. There is no history of asthma.       Constitution: Negative for fever.   HENT:  Positive for postnasal drip.    Respiratory:  Positive for cough, shortness of breath and wheezing.    Musculoskeletal:  Negative for muscle ache.   Skin:  Negative for rash.        Objective:   The physical exam was conducted virtually.  Physical Exam  Normal appearance  Active cough    Assessment:     1. Bronchitis        Plan:       Bronchitis    Other orders  -     albuterol (VENTOLIN HFA) 90 mcg/actuation inhaler; Inhale 2 puffs into the lungs every 6 (six) hours as needed for Wheezing. Rescue  Dispense: 18 g; Refill: 0  -     promethazine-dextromethorphan (PROMETHAZINE-DM) 6.25-15 mg/5 mL Syrp; Take 5 mLs by mouth every 6 (six) hours as needed (cough).  Dispense: 100 mL; Refill: 0  -     amoxicillin (AMOXIL) 500 MG capsule; Take 1 capsule (500 mg total) by mouth every 8 (eight) hours. for 7 days  Dispense: 21 capsule; Refill: 0  -     predniSONE (DELTASONE) 10 MG tablet; Take 1 tablet (10 mg total) by mouth once daily. for 5 days  Dispense: 5 tablet; Refill: 0

## 2025-03-11 DIAGNOSIS — I48.0 PAF (PAROXYSMAL ATRIAL FIBRILLATION) (HCC): ICD-10-CM

## 2025-03-12 RX ORDER — APIXABAN 5 MG/1
5 TABLET, FILM COATED ORAL EVERY 12 HOURS
Qty: 180 TABLET | Refills: 1 | Status: SHIPPED | OUTPATIENT
Start: 2025-03-12

## 2025-04-15 DIAGNOSIS — I10 HYPERTENSION, UNSPECIFIED TYPE: ICD-10-CM

## 2025-04-16 RX ORDER — POTASSIUM CHLORIDE 750 MG/1
10 CAPSULE, EXTENDED RELEASE ORAL DAILY
Qty: 90 CAPSULE | Refills: 1 | Status: SHIPPED | OUTPATIENT
Start: 2025-04-16

## 2025-05-05 DIAGNOSIS — K21.9 GASTROESOPHAGEAL REFLUX DISEASE WITHOUT ESOPHAGITIS: ICD-10-CM

## 2025-05-05 DIAGNOSIS — I10 ESSENTIAL HYPERTENSION: ICD-10-CM

## 2025-05-06 RX ORDER — PANTOPRAZOLE SODIUM 40 MG/1
40 TABLET, DELAYED RELEASE ORAL DAILY
Qty: 90 TABLET | Refills: 0 | Status: SHIPPED | OUTPATIENT
Start: 2025-05-06

## 2025-05-15 ENCOUNTER — OFFICE VISIT (OUTPATIENT)
Dept: CARDIOLOGY CLINIC | Facility: CLINIC | Age: 75
End: 2025-05-15
Payer: MEDICARE

## 2025-05-15 ENCOUNTER — RESULTS FOLLOW-UP (OUTPATIENT)
Dept: CARDIOLOGY CLINIC | Facility: CLINIC | Age: 75
End: 2025-05-15

## 2025-05-15 ENCOUNTER — TELEPHONE (OUTPATIENT)
Dept: CARDIOLOGY CLINIC | Facility: CLINIC | Age: 75
End: 2025-05-15

## 2025-05-15 ENCOUNTER — APPOINTMENT (OUTPATIENT)
Dept: LAB | Facility: HOSPITAL | Age: 75
End: 2025-05-15
Payer: MEDICARE

## 2025-05-15 VITALS
HEIGHT: 74 IN | TEMPERATURE: 97.8 F | OXYGEN SATURATION: 96 % | DIASTOLIC BLOOD PRESSURE: 80 MMHG | SYSTOLIC BLOOD PRESSURE: 132 MMHG | WEIGHT: 246 LBS | BODY MASS INDEX: 31.57 KG/M2 | HEART RATE: 108 BPM

## 2025-05-15 DIAGNOSIS — I48.92 ATRIAL FLUTTER, UNSPECIFIED TYPE (HCC): Primary | ICD-10-CM

## 2025-05-15 DIAGNOSIS — I65.21 CAROTID OCCLUSION, RIGHT: ICD-10-CM

## 2025-05-15 DIAGNOSIS — I73.9 PERIPHERAL VASCULAR DISEASE (HCC): ICD-10-CM

## 2025-05-15 DIAGNOSIS — I25.10 CORONARY ARTERY DISEASE INVOLVING NATIVE CORONARY ARTERY OF NATIVE HEART WITHOUT ANGINA PECTORIS: ICD-10-CM

## 2025-05-15 DIAGNOSIS — N18.31 STAGE 3A CHRONIC KIDNEY DISEASE (HCC): ICD-10-CM

## 2025-05-15 DIAGNOSIS — E66.9 OBESITY (BMI 30-39.9): ICD-10-CM

## 2025-05-15 DIAGNOSIS — E78.2 MIXED HYPERLIPIDEMIA: ICD-10-CM

## 2025-05-15 DIAGNOSIS — I48.0 PAF (PAROXYSMAL ATRIAL FIBRILLATION) (HCC): ICD-10-CM

## 2025-05-15 DIAGNOSIS — G47.30 SLEEP-DISORDERED BREATHING: ICD-10-CM

## 2025-05-15 DIAGNOSIS — I48.91 ATRIAL FIBRILLATION AND FLUTTER (HCC): Primary | ICD-10-CM

## 2025-05-15 DIAGNOSIS — I48.92 ATRIAL FIBRILLATION AND FLUTTER (HCC): Primary | ICD-10-CM

## 2025-05-15 DIAGNOSIS — I10 PRIMARY HYPERTENSION: ICD-10-CM

## 2025-05-15 LAB
ANION GAP SERPL CALCULATED.3IONS-SCNC: 6 MMOL/L (ref 4–13)
BUN SERPL-MCNC: 15 MG/DL (ref 5–25)
CALCIUM SERPL-MCNC: 9 MG/DL (ref 8.4–10.2)
CHLORIDE SERPL-SCNC: 106 MMOL/L (ref 96–108)
CO2 SERPL-SCNC: 27 MMOL/L (ref 21–32)
CREAT SERPL-MCNC: 0.91 MG/DL (ref 0.6–1.3)
GFR SERPL CREATININE-BSD FRML MDRD: 81 ML/MIN/1.73SQ M
GLUCOSE P FAST SERPL-MCNC: 96 MG/DL (ref 65–99)
MAGNESIUM SERPL-MCNC: 2.1 MG/DL (ref 1.9–2.7)
POTASSIUM SERPL-SCNC: 4.7 MMOL/L (ref 3.5–5.3)
SODIUM SERPL-SCNC: 139 MMOL/L (ref 135–147)
TSH SERPL DL<=0.05 MIU/L-ACNC: 1.68 UIU/ML (ref 0.45–4.5)

## 2025-05-15 PROCEDURE — 36415 COLL VENOUS BLD VENIPUNCTURE: CPT

## 2025-05-15 PROCEDURE — 84443 ASSAY THYROID STIM HORMONE: CPT

## 2025-05-15 PROCEDURE — 80048 BASIC METABOLIC PNL TOTAL CA: CPT

## 2025-05-15 PROCEDURE — 83735 ASSAY OF MAGNESIUM: CPT

## 2025-05-15 PROCEDURE — 99214 OFFICE O/P EST MOD 30 MIN: CPT | Performed by: NURSE PRACTITIONER

## 2025-05-15 NOTE — TELEPHONE ENCOUNTER
I reached out to Dr. Chang's office to inform him Dorian was in a rapid atrial flutter today.    Please contact Dorian to let him know we got the ok for cardioversion  - please schedule next week with Dr. Crowder. He will need a . Nothing to eat/drink after midnight except sips of water with his pills. Take all his pills as usual.

## 2025-05-16 ENCOUNTER — TRANSCRIBE ORDERS (OUTPATIENT)
Dept: SLEEP CENTER | Facility: CLINIC | Age: 75
End: 2025-05-16

## 2025-05-16 DIAGNOSIS — G47.30 SLEEP-DISORDERED BREATHING: ICD-10-CM

## 2025-05-16 DIAGNOSIS — G47.8 OTHER SLEEP DISORDERS: Primary | ICD-10-CM

## 2025-05-16 NOTE — TELEPHONE ENCOUNTER
I spoke with Kaycee Brown. Dorian is scheduled for his cardioversion on 5/21 at 9 am. He needs to arrive at the [facility] at 8 am. Please let him know the date and time.  I gave him instructions last night for nothing to eat or drink after midnight. Take pills with sips of water. Needs a .  Thank you!

## 2025-05-16 NOTE — ASSESSMENT & PLAN NOTE
Known R ICA occlusion and mild L ICA stenosis.  Now following with [facility] Vascular surgery with no surgical intervention advised at this time. Patient has been counseled on S/S to report.  Continue high intensity statin.  Will avoid hypotension.  Not on aspirin in lieu of Plavix and Eliquis.

## 2025-05-16 NOTE — PROGRESS NOTES
Cardiology Follow Up    Dorian Restrepo  1950  [id number]  [facility] [place] [address]  [address] FLO[address]  [facility] [address] [phone]    Dorian presents for routine follow up of CAD, PAF, HTN, HLD.    1. Atrial fibrillation and flutter (HCC)  Assessment & Plan:  History of post-op a fib.   On Sotalol 120 mg twice daily and metoprolol succinate 25 mg daily, and Eliquis 5 mg BID  [facility].  ECG today demonstrates atrial flutter with ventricular rate 108 bpm.  He is relatively asymptomatic. Echo 12/2024 with EF 60% with dilated RV and PASP 46mmHg.  Will obtain sleep study.  No recent missed med doses  Labs today, to include BMP, Mag, TSH.  Plan DCCV at our campus next week.  I did place a call to Dr. Chang to make him aware.  Orders:  -     TSH, 3rd generation with Free T4 reflex; Future; Expected date: 05/15/2025 (Use expected date for collection)  -     Basic metabolic panel; Future; Expected date: 05/15/2025  -     Magnesium; Future; Expected date: 05/15/2025 (Use expected date for collection)  2. Coronary artery disease involving native coronary artery of native heart without angina pectoris  Assessment & Plan:  Coronary artery disease:  A.  NSTEMI 10/01/2008  B.  2 Taxus DEIRDRE to the RCA 10/01/2008  C.  Moderate left main disease on cardiac catheterization 2008, 2012 and 2014  D.  Cardiac catheterization 06/29/2016 with FFR less than 0.75 with left main 50% stenotic  E.  CABG x 2. LIMA-LAD. SVG-OM 7/29/16.  F.  Non ischemic Lexiscan nuclear stress test 9/2/2021  G. Non ischemic Lexiscan nuclear stress test 12/28/22 at [facility].  - - - - - - -  Adamantly denies anginal complaints.  Preserved LVEF on 12/2024 echo.  He is currently on clopidogrel and eliquis. No aspirin.   Continue atorvastatin 40 mg daily for goal LDL < 70.   We reviewed urgent s/s to report and when to seek immediate medical attention.  3. Carotid  HR=45 bpm, DOOP=796/57 mmhg, SpO2=95.0 %, Resp=10 B/min, EtCO2=36 mmHg, Apnea=4 Seconds, Pain=0, Kelly=10, [place]=2 occlusion, right  Assessment & Plan:  Known R ICA occlusion and mild L ICA stenosis.  Now following with [facility] Vascular surgery with no surgical intervention advised at this time. Patient has been counseled on S/S to report.  Continue high intensity statin.  Will avoid hypotension.  Not on aspirin in lieu of Plavix and Eliquis.  4. Primary hypertension  Assessment & Plan:  BP at goal today  Amlodipine was discontinued and irbesartan reduced to 150 mg daily.  Continue metoprolol succinate 25 mg daily, sotalol 120 mg BID.   Reviewed benefits of 2 g sodium diet, avoid caffeine, weight control, regular exercise.  5. Peripheral vascular disease (HCC)  Assessment & Plan:  Known bilateral PAD.  Pletal discontinued due to bleeding and interaction with sotalol.  Continues annual surveillance with [facility] vascular surgery.  Remains on Plavix and Eliquis as well as high intensity statin  He has been counseled on S/S to report.  6. Mixed hyperlipidemia  Assessment & Plan:  Lipid panel 10/31/2024: C 114. T 122. H 38. L 52.  Atorvastatin 40 mg.    Goal LDL < 70.   Continue current regimen and repeat lipid panel fall 2025    7. Sleep-disordered breathing  Assessment & Plan:  + snoring, frequent sleep disruptions, daytime fatigue/napping  Echo with RV dilation and PASP 46mmHg  Sleep study ordered  Orders:  -     Ambulatory Referral to Sleep Medicine; Future  8. Obesity (BMI 30-39.9)  Assessment & Plan:  Body mass index is 31.58 kg/m².   Discussed the importance of resuming exercise program and trying to maintain a healthy diet.   9. Stage 3a chronic kidney disease (HCC)  Assessment & Plan:  Lab Results   Component Value Date    EGFR 81 05/15/2025    EGFR 83 10/31/2024    EGFR 88 09/12/2024    CREATININE 0.91 05/15/2025    CREATININE 0.95 10/31/2024    CREATININE 0.89 09/12/2024     Updated labs today     ANAHY Alarcon has a past medical history of coronary artery disease dating back to 10/2008 when he had DEIRDRE x 2 placed to  an occluded right coronary artery. He ultimately underwent CABG x 2 with LIMA-LAD and SVG-OM 7/29/16. He had post operative atrial fibrillation and follows with Dr. Chang at [facility] for EP.  He is on sotalol. He has known carotid occlusion for which he was following with Dr. Cintron (vascular surgery).      He followed up most recently on 12/12/2024 for evaluation of low blood pressures. His amlodipine was discontinued and irbesartan reduced to 150 mg daily. ECG at that visit showed SR with 1st degree AV block.     5/15/2025: Dorian presents for routine follow up. Overall he feels fairly well. His HR was noted to be 108 and ECG demonstrates atrial flutter with RVR. He is surprised by this finding, but then admits that he has noticed more dyspnea on exertion. Adamantly denies chest pain, orthopnea. No edema. Weight is stable. No lightheadedness/dizziness or palpitations. He states his HR was also in the low 100's last week at his PCP. He reports faithful med adherence and is adamant that he has not missed any doses of his Eliquis, sotalol or metoprolol. His BP has been much better with changes this past winter. He is scheduled to meet with Dr. Chang on 6/5/2025 for routine follow up. Echo 12/20/2024 showed LVEF 60% with bi-atrial enlargement, dilated RV with mild TR and PASP 46 mmHg, which is new. He does admit to very poor sleep quality with frequent nighttime awakening and requiring daytime naps due to fatigue.    Medical Problems       Problem List       CAD (coronary artery disease)    S/P CABG (coronary artery bypass graft)    Carotid occlusion, right    Peripheral vascular disease (HCC)    PAF (paroxysmal atrial fibrillation) (HCC)    Non-rheumatic mitral regurgitation    Hypertension    Hyperlipidemia    Stage 3a chronic kidney disease ([facility])    Obesity (BMI 30-39.9)    Dyspnea on exertion    Benign prostatic hyperplasia with lower urinary tract symptoms    Numbness in feet    Claudication ([facility])         Past Medical History:   Diagnosis Date    Atrial fibrillation (HCC)     Carotid occlusion, right     Coronary artery disease     GERD (gastroesophageal reflux disease)     Hyperlipidemia     Hypertension     Myocardial infarct (HCC)      Social History     Socioeconomic History    Marital status: /Civil Union     Spouse name: Not on file    Number of children: Not on file    Years of education: Not on file    Highest education level: Not on file   Occupational History    Occupation: Retired   Tobacco Use    Smoking status: Former    Smokeless tobacco: Never   Vaping Use    Vaping status: Never Used   Substance and Sexual Activity    Alcohol use: Yes     Alcohol/week: 1.0 standard drink of alcohol     Types: 1 Cans of beer per week     Comment: rarely    Drug use: Never    Sexual activity: Not on file     Comment: Defer   Other Topics Concern    Not on file   Social History Narrative    Not on file     Social Drivers of Health     Financial Resource Strain: Low Risk  (10/30/2024)    Received from [facility]    Overall Financial Resource Strain (CARDIA)     Difficulty of Paying Living Expenses: Not hard at all   Food Insecurity: No Food Insecurity (10/30/2024)    Received from [facility]    Hunger Vital Sign     Within the past 12 months, you worried that your food would run out before you got the money to buy more.: Never true     Within the past 12 months, the food you bought just didn't last and you didn't have money to get more.: Never true   Transportation Needs: No Transportation Needs (10/30/2024)    Received from [facility]    PRAPARE - Transportation     Lack of Transportation (Medical): No     Lack of Transportation (Non-Medical): No   Physical Activity: Not on file   Stress: No Stress Concern Present (10/30/2024)    Received from [facility]    [place] [employer] - Occupational Stress Questionnaire      Feeling of Stress : Not at all   Social Connections: Feeling Socially Integrated (10/30/2024)    Received from [facility]    OASIS : Social Isolation     How often do you feel lonely or isolated from those around you?: Never   Intimate Partner Violence: Not At Risk (10/30/2024)    Received from [facility]    Humiliation, Afraid, Rape, and Kick questionnaire     Within the last year, have you been afraid of your partner or ex-partner?: No     Within the last year, have you been humiliated or emotionally abused in other ways by your partner or ex-partner?: No     Within the last year, have you been kicked, hit, slapped, or otherwise physically hurt by your partner or ex-partner?: No     Within the last year, have you been raped or forced to have any kind of sexual activity by your partner or ex-partner?: No   Housing Stability: Unknown (10/30/2024)    Received from [facility]    Housing Stability Vital Sign     In the last 12 months, was there a time when you were not able to pay the mortgage or rent on time?: No     Number of Times Moved in the Last Year: Not on file     At any time in the past 12 months, were you homeless or living in a shelter (including now)?: No      Family History   Problem Relation Age of Onset    Heart disease Mother     Heart failure Father      Past Surgical History:   Procedure Laterality Date    CARDIAC CATHETERIZATION  10/01/2008    RCA with 100% subacute occlusion. DEIRDRE x2 to RCA. Ostial left main disease with FFR 0.78.     CARDIAC CATHETERIZATION  03/16/2012    Moderate 50% ostial left main. FFR 0.82. Distal RCA 40%. No intervention.     CARDIAC CATHETERIZATION  11/24/2014    Ostial left main 60%. Ostial left circumflex 70%.     CARDIAC CATHETERIZATION  06/29/2016    Left main 50%. FFR was less than 0.75. CABG recommended.     CARDIOVERSION  07/01/2017    CEREBRAL ANGIOGRAM  12/12/2013    Carotid angiography. Left internal  carotid artery 40%. Right internal carotid artery 100% occlusion.     CHOLECYSTECTOMY      CORONARY ARTERY BYPASS GRAFT  07/29/2016    LIMA-LAD. SVG-OM.     LUMBAR DISC SURGERY      TONSILLECTOMY       Current Medications[1]  No Known Allergies    Labs:     Chemistry        Component Value Date/Time    K 4.7 05/15/2025 1210    K 4.8 10/31/2024 0806     05/15/2025 1210     10/31/2024 0806    CO2 27 05/15/2025 1210    CO2 26 10/31/2024 0806    BUN 15 05/15/2025 1210    BUN 21 10/31/2024 0806    CREATININE 0.91 05/15/2025 1210    CREATININE 0.95 10/31/2024 0806        Component Value Date/Time    CALCIUM 9.0 05/15/2025 1210    CALCIUM 9.1 10/31/2024 0806    ALKPHOS 72 10/31/2024 0806    AST 14 10/31/2024 0806    ALT 11 10/31/2024 0806      Lipid panel 10/31/2024: C 114. T 122. H 38. L 52.       ECG 5/15/2025:  atrial flutter with ventricular rate 108 bpm.    ECG 12/12/2024: Sinus rhythm with sinus arrhythmia and 1st degree AV block. Rate 76 bpm.      14 day ZIO monitor ([facility]) 1/2023:  Predominantly sinus rhythm, HR , avg 76 bpm. 10.8% PAC burden, < 1% PVC burden. No a fib or flutter detected.  80 SVT runs, longest 33.2 seconds. 5 runs of NSVT, longest 8 beats.     ECG 9/9/2022: Normal sinus rhythm. Normal ECG.      Echocardiogram 8/25/2022:  EF 55-60%. Mod LVH. Low normal to mildly reduced RV function.  Biatrial dilation.  Trace-mild MR. Mild TR. Mildly increased PASP.   Mildly dilated aortic root. Ascending aorta normal size.      Lipid panel 4/5/2022: C 123. T 101. H 41. L 62.      Carotid Duplex 12/10/2021: No report available (will try to obtain).      Lexiscan nuclear stress test 9/2/2021:   Fernando protocol attempted. Transitioned to Lexiscan as heart rate blunted. No evidence of ischemia. Fixed inferior defect in RCA territory. EF 52%.      Lipid panel 3/2/2021: C 131. T 142. H 37. L 66.      Carotid Duplex 11/23/2020: JAY known 100% occlusion. 20-49% LICA stenosis. No change from prior study.       Echocardiogram 2/17/2020:  Mildly dilated left ventricle with normal left ventricular systolic function.  EF estimated at 60-65%.  Mild concentric left ventricular hypertrophy.  Abnormal septal motion likely due to known cardiac surgery.  Mild diastolic dysfunction.  Moderately dilated right ventricle with normal right ventricular systolic function.  Mild left and right atrial enlargement.  Aortic sclerosis without stenosis.  Mitral annular calcification. Mild central mitral regurgitation.  Mild tricuspid regurgitation. Estimated PASP 30 mmHg.     Cv Stress Nuclear Pharm: Result Date: 5/4/2018  No evidence of ECG changes to suggest ischemia. · The patient reported shortness of breath during the stress test. · Blood pressure demonstrated a hypotensive response to stress. · There is a small to moderate sized non-reversible (scar/infarct) defect of mild severity present in the basal anterior and basal inferior wall(s), mostly normalized with CT correction, consistent with diaphragmatic attenuation. · Left ventricular perfusion is probably normal. No ischemia · This is a low risk study.     Arterial duplex 11/06/2013:  No abdominal aortic aneurysm.  No significant aortoiliac disease.    Stress echocardiogram 6/02/2016:  Fernando. 7:42 seconds.  8.3 METs.  Hypotension with exercise.  66% of maximum predicted heart rate.  Frequent PACs and PVCs.  Atrial tachycardia.  No ischemia at submaximal heart rate.     Carotid duplex 06/05/2017:  Occluded right internal carotid artery. 50 to 69% left internal carotid artery stenosis.   Twelve month follow-up recommended.     Echocardiogram 12/22/2016:  Mildly dilated LV.  EF 55%.  Abnormal septal motion.  Mild concentric left ventricular hypertrophy.  Mildly enlarged RV with normal RV systolic function.  Mild right atrial enlargement/left atrial enlargement.  Aortic sclerosis.  No AI.  MAC.  Mild to moderate central MR.  Mild TR.  Top-normal PASP 39 mmHg.    Review of Systems  "  Constitutional: Negative.   HENT: Negative.     Cardiovascular:  Positive for dyspnea on exertion. Negative for chest pain, irregular heartbeat, near-syncope, orthopnea and palpitations.   Respiratory:  Positive for sleep disturbances due to breathing and snoring. Negative for cough.    Endocrine: Negative.    Skin: Negative.    Musculoskeletal: Negative.    Gastrointestinal: Negative.    Genitourinary:  Positive for nocturia.   Neurological: Negative.    Psychiatric/Behavioral: Negative.         Vitals:    05/15/25 1123   BP: 132/80   Pulse: (!) 108   Temp: 97.8 °F (36.6 °C)   SpO2: 96%     Vitals:    05/15/25 1123   Weight: 112 kg (246 lb)     Height: 6' 2\" (188 cm)   Body mass index is 31.58 kg/m².    Physical Exam  Vitals and nursing note reviewed.   Constitutional:       General: He is not in acute distress.     Appearance: He is well-developed. He is obese. He is not diaphoretic.   HENT:      Head: Normocephalic and atraumatic.   Neck:      Vascular: No carotid bruit or JVD.     Cardiovascular:      Rate and Rhythm: Normal rate and regular rhythm.      Pulses: Intact distal pulses.      Heart sounds: Normal heart sounds, S1 normal and S2 normal. No murmur heard.     No friction rub. No gallop.   Pulmonary:      Effort: Pulmonary effort is normal. No respiratory distress.      Breath sounds: Normal breath sounds.   Abdominal:      General: There is no distension.      Palpations: Abdomen is soft.      Tenderness: There is no abdominal tenderness.     Skin:     General: Skin is warm and dry.      Findings: No rash.     Neurological:      Mental Status: He is alert and oriented to person, place, and time.     Psychiatric:         Behavior: Behavior normal.                     [1]   Current Outpatient Medications:     acetaminophen (TYLENOL) 500 mg tablet, Take by mouth, Disp: , Rfl:     allopurinol (ZYLOPRIM) 300 mg tablet, Take 300 mg by mouth in the morning., Disp: , Rfl:     apixaban (Eliquis) 5 mg, TAKE 1 " TABLET EVERY 12 HOURS, Disp: 180 tablet, Rfl: 1    atorvastatin (LIPITOR) 40 mg tablet, TAKE 1 TABLET DAILY, Disp: 90 tablet, Rfl: 1    Cholecalciferol (VITAMIN D3 HIGH POTENCY PO), Take by mouth, Disp: , Rfl:     clopidogrel (PLAVIX) 75 mg tablet, TAKE 1 TABLET DAILY, Disp: 90 tablet, Rfl: 1    finasteride (PROSCAR) 5 mg tablet, Take by mouth, Disp: , Rfl:     irbesartan (AVAPRO) 300 mg tablet, Take 0.5 tablets (150 mg total) by mouth daily, Disp: 90 tablet, Rfl: 1    magnesium oxide (MAG-OX) 400 mg tablet, Take 400 mg by mouth, Disp: , Rfl:     metoprolol succinate (TOPROL-XL) 25 mg 24 hr tablet, Take 25 mg by mouth in the morning., Disp: , Rfl:     nitroglycerin (NITROSTAT) 0.4 mg SL tablet, , Disp: , Rfl:     pantoprazole (PROTONIX) 40 mg tablet, TAKE 1 TABLET DAILY, Disp: 90 tablet, Rfl: 0    potassium chloride (MICRO-K) 10 MEQ CR capsule, TAKE 1 CAPSULE DAILY, Disp: 90 capsule, Rfl: 1    sotalol (BETAPACE) 120 mg tablet, TAKE 1 TABLET TWICE A DAY, Disp: 180 tablet, Rfl: 3    tamsulosin (FLOMAX) 0.4 mg, Take by mouth, Disp: , Rfl:     tolterodine (DETROL LA) 4 mg 24 hr capsule, , Disp: , Rfl:

## 2025-05-16 NOTE — ASSESSMENT & PLAN NOTE
Known bilateral PAD.  Pletal discontinued due to bleeding and interaction with sotalol.  Continues annual surveillance with [facility] vascular surgery.  Remains on Plavix and Eliquis as well as high intensity statin  He has been counseled on S/S to report.

## 2025-05-16 NOTE — ASSESSMENT & PLAN NOTE
History of post-op a fib.   On Sotalol 120 mg twice daily and metoprolol succinate 25 mg daily, and Eliquis 5 mg BID  [facility]  ECG today demonstrates atrial flutter with ventricular rate 108 bpm.  He is relatively asymptomatic. Echo 12/2024 with EF 60% with dilated RV and PASP 46mmHg.  Will obtain sleep study.  No recent missed med doses  Labs today, to include BMP, Mag, TSH.  Plan DCCV at our campus next week.  I did place a call to Dr. Chang to make him aware.

## 2025-05-16 NOTE — ASSESSMENT & PLAN NOTE
+ snoring, frequent sleep disruptions, daytime fatigue/napping  Echo with RV dilation and PASP 46mmHg  Sleep study ordered

## 2025-05-16 NOTE — ASSESSMENT & PLAN NOTE
Body mass index is 31.58 kg/m².   Discussed the importance of resuming exercise program and trying to maintain a healthy diet.

## 2025-05-16 NOTE — ASSESSMENT & PLAN NOTE
Lab Results   Component Value Date    EGFR 81 05/15/2025    EGFR 83 10/31/2024    EGFR 88 09/12/2024    CREATININE 0.91 05/15/2025    CREATININE 0.95 10/31/2024    CREATININE 0.89 09/12/2024     Updated labs today

## 2025-05-16 NOTE — ASSESSMENT & PLAN NOTE
BP at goal today  Amlodipine was discontinued and irbesartan reduced to 150 mg daily.  Continue metoprolol succinate 25 mg daily, sotalol 120 mg BID.   Reviewed benefits of 2 g sodium diet, avoid caffeine, weight control, regular exercise.

## 2025-05-16 NOTE — H&P (VIEW-ONLY)
Cardiology Follow Up    Dorian Restrepo  1950  [id number]  [facility] [place] [address]  [address] FLO[address]  [facility] [address] [phone]    Dorian presents for routine follow up of CAD, PAF, HTN, HLD.    1. Atrial fibrillation and flutter (HCC)  Assessment & Plan:  History of post-op a fib.   On Sotalol 120 mg twice daily and metoprolol succinate 25 mg daily, and Eliquis 5 mg BID  [facility] Hartmann.  ECG today demonstrates atrial flutter with ventricular rate 108 bpm.  He is relatively asymptomatic. Echo 12/2024 with EF 60% with dilated RV and PASP 46mmHg.  Will obtain sleep study.  No recent missed med doses  Labs today, to include BMP, Mag, TSH.  Plan DCCV at our campus next week.  I did place a call to Dr. Chang to make him aware.  Orders:  -     TSH, 3rd generation with Free T4 reflex; Future; Expected date: 05/15/2025 (Use expected date for collection)  -     Basic metabolic panel; Future; Expected date: 05/15/2025  -     Magnesium; Future; Expected date: 05/15/2025 (Use expected date for collection)  2. Coronary artery disease involving native coronary artery of native heart without angina pectoris  Assessment & Plan:  Coronary artery disease:  A.  NSTEMI 10/01/2008  B.  2 Taxus DEIRDRE to the RCA 10/01/2008  C.  Moderate left main disease on cardiac catheterization 2008, 2012 and 2014  D.  Cardiac catheterization 06/29/2016 with FFR less than 0.75 with left main 50% stenotic  E.  CABG x 2. LIMA-LAD. SVG-OM 7/29/16.  F.  Non ischemic Lexiscan nuclear stress test 9/2/2021  G. Non ischemic Lexiscan nuclear stress test 12/28/22 at [facility].  - - - - - - -  Adamantly denies anginal complaints.  Preserved LVEF on 12/2024 echo.  He is currently on clopidogrel and eliquis. No aspirin.   Continue atorvastatin 40 mg daily for goal LDL < 70.   We reviewed urgent s/s to report and when to seek immediate medical attention.  3. Carotid  occlusion, right  Assessment & Plan:  Known R ICA occlusion and mild L ICA stenosis.  Now following with [facility] Vascular surgery with no surgical intervention advised at this time. Patient has been counseled on S/S to report.  Continue high intensity statin.  Will avoid hypotension.  Not on aspirin in lieu of Plavix and Eliquis.  4. Primary hypertension  Assessment & Plan:  BP at goal today  Amlodipine was discontinued and irbesartan reduced to 150 mg daily.  Continue metoprolol succinate 25 mg daily, sotalol 120 mg BID.   Reviewed benefits of 2 g sodium diet, avoid caffeine, weight control, regular exercise.  5. Peripheral vascular disease (HCC)  Assessment & Plan:  Known bilateral PAD.  Pletal discontinued due to bleeding and interaction with sotalol.  Continues annual surveillance with [facility] vascular surgery.  Remains on Plavix and Eliquis as well as high intensity statin  He has been counseled on S/S to report.  6. Mixed hyperlipidemia  Assessment & Plan:  Lipid panel 10/31/2024: C 114. T 122. H 38. L 52.  Atorvastatin 40 mg.    Goal LDL < 70.   Continue current regimen and repeat lipid panel fall 2025    7. Sleep-disordered breathing  Assessment & Plan:  + snoring, frequent sleep disruptions, daytime fatigue/napping  Echo with RV dilation and PASP 46mmHg  Sleep study ordered  Orders:  -     Ambulatory Referral to Sleep Medicine; Future  8. Obesity (BMI 30-39.9)  Assessment & Plan:  Body mass index is 31.58 kg/m².   Discussed the importance of resuming exercise program and trying to maintain a healthy diet.   9. Stage 3a chronic kidney disease (HCC)  Assessment & Plan:  Lab Results   Component Value Date    EGFR 81 05/15/2025    EGFR 83 10/31/2024    EGFR 88 09/12/2024    CREATININE 0.91 05/15/2025    CREATININE 0.95 10/31/2024    CREATININE 0.89 09/12/2024     Updated labs today     ANAHY Alarcon has a past medical history of coronary artery disease dating back to 10/2008 when he had DEIRDRE x 2 placed to  an occluded right coronary artery. He ultimately underwent CABG x 2 with LIMA-LAD and SVG-OM 7/29/16. He had post operative atrial fibrillation and follows with Dr. Chang at [facility] for EP.  He is on sotalol. He has known carotid occlusion for which he was following with Dr. Cintron (vascular surgery).      He followed up most recently on 12/12/2024 for evaluation of low blood pressures. His amlodipine was discontinued and irbesartan reduced to 150 mg daily. ECG at that visit showed SR with 1st degree AV block.     5/15/2025: Dorian presents for routine follow up. Overall he feels fairly well. His HR was noted to be 108 and ECG demonstrates atrial flutter with RVR. He is surprised by this finding, but then admits that he has noticed more dyspnea on exertion. Adamantly denies chest pain, orthopnea. No edema. Weight is stable. No lightheadedness/dizziness or palpitations. He states his HR was also in the low 100's last week at his PCP. He reports faithful med adherence and is adamant that he has not missed any doses of his Eliquis, sotalol or metoprolol. His BP has been much better with changes this past winter. He is scheduled to meet with Dr. Chang on 6/5/2025 for routine follow up. Echo 12/20/2024 showed LVEF 60% with bi-atrial enlargement, dilated RV with mild TR and PASP 46 mmHg, which is new. He does admit to very poor sleep quality with frequent nighttime awakening and requiring daytime naps due to fatigue.    Medical Problems       Problem List       CAD (coronary artery disease)    S/P CABG (coronary artery bypass graft)    Carotid occlusion, right    Peripheral vascular disease (HCC)    PAF (paroxysmal atrial fibrillation) (HCC)    Non-rheumatic mitral regurgitation    Hypertension    Hyperlipidemia    Stage 3a chronic kidney disease ([facility])    Obesity (BMI 30-39.9)    Dyspnea on exertion    Benign prostatic hyperplasia with lower urinary tract symptoms    Numbness in feet    Claudication ([facility])         Past Medical History:   Diagnosis Date    Atrial fibrillation (HCC)     Carotid occlusion, right     Coronary artery disease     GERD (gastroesophageal reflux disease)     Hyperlipidemia     Hypertension     Myocardial infarct (HCC)      Social History     Socioeconomic History    Marital status: /Civil Union     Spouse name: Not on file    Number of children: Not on file    Years of education: Not on file    Highest education level: Not on file   Occupational History    Occupation: Retired   Tobacco Use    Smoking status: Former    Smokeless tobacco: Never   Vaping Use    Vaping status: Never Used   Substance and Sexual Activity    Alcohol use: Yes     Alcohol/week: 1.0 standard drink of alcohol     Types: 1 Cans of beer per week     Comment: rarely    Drug use: Never    Sexual activity: Not on file     Comment: Defer   Other Topics Concern    Not on file   Social History Narrative    Not on file     Social Drivers of Health     Financial Resource Strain: Low Risk  (10/30/2024)    Received from [facility]    Overall Financial Resource Strain (CARDIA)     Difficulty of Paying Living Expenses: Not hard at all   Food Insecurity: No Food Insecurity (10/30/2024)    Received from [facility]    Hunger Vital Sign     Within the past 12 months, you worried that your food would run out before you got the money to buy more.: Never true     Within the past 12 months, the food you bought just didn't last and you didn't have money to get more.: Never true   Transportation Needs: No Transportation Needs (10/30/2024)    Received from [facility]    PRAPARE - Transportation     Lack of Transportation (Medical): No     Lack of Transportation (Non-Medical): No   Physical Activity: Not on file   Stress: No Stress Concern Present (10/30/2024)    Received from [facility]    [employer] - Occupational Stress Questionnaire      Feeling of Stress : Not at all   Social Connections: Feeling Socially Integrated (10/30/2024)    Received from [facility]    OASIS : Social Isolation     How often do you feel lonely or isolated from those around you?: Never   Intimate Partner Violence: Not At Risk (10/30/2024)    Received from [facility]    Humiliation, Afraid, Rape, and Kick questionnaire     Within the last year, have you been afraid of your partner or ex-partner?: No     Within the last year, have you been humiliated or emotionally abused in other ways by your partner or ex-partner?: No     Within the last year, have you been kicked, hit, slapped, or otherwise physically hurt by your partner or ex-partner?: No     Within the last year, have you been raped or forced to have any kind of sexual activity by your partner or ex-partner?: No   Housing Stability: Unknown (10/30/2024)    Received from [facility]    Housing Stability Vital Sign     In the last 12 months, was there a time when you were not able to pay the mortgage or rent on time?: No     Number of Times Moved in the Last Year: Not on file     At any time in the past 12 months, were you homeless or living in a shelter (including now)?: No      Family History   Problem Relation Age of Onset    Heart disease Mother     Heart failure Father      Past Surgical History:   Procedure Laterality Date    CARDIAC CATHETERIZATION  10/01/2008    RCA with 100% subacute occlusion. DEIRDRE x2 to RCA. Ostial left main disease with FFR 0.78.     CARDIAC CATHETERIZATION  03/16/2012    Moderate 50% ostial left main. FFR 0.82. Distal RCA 40%. No intervention.     CARDIAC CATHETERIZATION  11/24/2014    Ostial left main 60%. Ostial left circumflex 70%.     CARDIAC CATHETERIZATION  06/29/2016    Left main 50%. FFR was less than 0.75. CABG recommended.     CARDIOVERSION  07/01/2017    CEREBRAL ANGIOGRAM  12/12/2013    Carotid angiography. Left internal  carotid artery 40%. Right internal carotid artery 100% occlusion.     CHOLECYSTECTOMY      CORONARY ARTERY BYPASS GRAFT  07/29/2016    LIMA-LAD. SVG-OM.     LUMBAR DISC SURGERY      TONSILLECTOMY       Current Medications[1]  No Known Allergies    Labs:     Chemistry        Component Value Date/Time    K 4.7 05/15/2025 1210    K 4.8 10/31/2024 0806     05/15/2025 1210     10/31/2024 0806    CO2 27 05/15/2025 1210    CO2 26 10/31/2024 0806    BUN 15 05/15/2025 1210    BUN 21 10/31/2024 0806    CREATININE 0.91 05/15/2025 1210    CREATININE 0.95 10/31/2024 0806        Component Value Date/Time    CALCIUM 9.0 05/15/2025 1210    CALCIUM 9.1 10/31/2024 0806    ALKPHOS 72 10/31/2024 0806    AST 14 10/31/2024 0806    ALT 11 10/31/2024 0806      Lipid panel 10/31/2024: C 114. T 122. H 38. L 52.       ECG 5/15/2025:  atrial flutter with ventricular rate 108 bpm.    ECG 12/12/2024: Sinus rhythm with sinus arrhythmia and 1st degree AV block. Rate 76 bpm.      14 day ZIO monitor ([facility]) 1/2023:  Predominantly sinus rhythm, HR , avg 76 bpm. 10.8% PAC burden, < 1% PVC burden. No a fib or flutter detected.  80 SVT runs, longest 33.2 seconds. 5 runs of NSVT, longest 8 beats.     ECG 9/9/2022: Normal sinus rhythm. Normal ECG.      Echocardiogram 8/25/2022:  EF 55-60%. Mod LVH. Low normal to mildly reduced RV function.  Biatrial dilation.  Trace-mild MR. Mild TR. Mildly increased PASP.   Mildly dilated aortic root. Ascending aorta normal size.      Lipid panel 4/5/2022: C 123. T 101. H 41. L 62.      Carotid Duplex 12/10/2021: No report available (will try to obtain).      Lexiscan nuclear stress test 9/2/2021:   Fernando protocol attempted. Transitioned to Lexiscan as heart rate blunted. No evidence of ischemia. Fixed inferior defect in RCA territory. EF 52%.      Lipid panel 3/2/2021: C 131. T 142. H 37. L 66.      Carotid Duplex 11/23/2020: JAY known 100% occlusion. 20-49% LICA stenosis. No change from prior study.       Echocardiogram 2/17/2020:  Mildly dilated left ventricle with normal left ventricular systolic function.  EF estimated at 60-65%.  Mild concentric left ventricular hypertrophy.  Abnormal septal motion likely due to known cardiac surgery.  Mild diastolic dysfunction.  Moderately dilated right ventricle with normal right ventricular systolic function.  Mild left and right atrial enlargement.  Aortic sclerosis without stenosis.  Mitral annular calcification. Mild central mitral regurgitation.  Mild tricuspid regurgitation. Estimated PASP 30 mmHg.     Cv Stress Nuclear Pharm: Result Date: 5/4/2018  No evidence of ECG changes to suggest ischemia. · The patient reported shortness of breath during the stress test. · Blood pressure demonstrated a hypotensive response to stress. · There is a small to moderate sized non-reversible (scar/infarct) defect of mild severity present in the basal anterior and basal inferior wall(s), mostly normalized with CT correction, consistent with diaphragmatic attenuation. · Left ventricular perfusion is probably normal. No ischemia · This is a low risk study.     Arterial duplex 11/06/2013:  No abdominal aortic aneurysm.  No significant aortoiliac disease.    Stress echocardiogram 6/02/2016:  Fernando. 7:42 seconds.  8.3 METs.  Hypotension with exercise.  66% of maximum predicted heart rate.  Frequent PACs and PVCs.  Atrial tachycardia.  No ischemia at submaximal heart rate.     Carotid duplex 06/05/2017:  Occluded right internal carotid artery. 50 to 69% left internal carotid artery stenosis.   Twelve month follow-up recommended.     Echocardiogram 12/22/2016:  Mildly dilated LV.  EF 55%.  Abnormal septal motion.  Mild concentric left ventricular hypertrophy.  Mildly enlarged RV with normal RV systolic function.  Mild right atrial enlargement/left atrial enlargement.  Aortic sclerosis.  No AI.  MAC.  Mild to moderate central MR.  Mild TR.  Top-normal PASP 39 mmHg.    Review of Systems  "  Constitutional: Negative.   HENT: Negative.     Cardiovascular:  Positive for dyspnea on exertion. Negative for chest pain, irregular heartbeat, near-syncope, orthopnea and palpitations.   Respiratory:  Positive for sleep disturbances due to breathing and snoring. Negative for cough.    Endocrine: Negative.    Skin: Negative.    Musculoskeletal: Negative.    Gastrointestinal: Negative.    Genitourinary:  Positive for nocturia.   Neurological: Negative.    Psychiatric/Behavioral: Negative.         Vitals:    05/15/25 1123   BP: 132/80   Pulse: (!) 108   Temp: 97.8 °F (36.6 °C)   SpO2: 96%     Vitals:    05/15/25 1123   Weight: 112 kg (246 lb)     Height: 6' 2\" (188 cm)   Body mass index is 31.58 kg/m².    Physical Exam  Vitals and nursing note reviewed.   Constitutional:       General: He is not in acute distress.     Appearance: He is well-developed. He is obese. He is not diaphoretic.   HENT:      Head: Normocephalic and atraumatic.   Neck:      Vascular: No carotid bruit or JVD.     Cardiovascular:      Rate and Rhythm: Normal rate and regular rhythm.      Pulses: Intact distal pulses.      Heart sounds: Normal heart sounds, S1 normal and S2 normal. No murmur heard.     No friction rub. No gallop.   Pulmonary:      Effort: Pulmonary effort is normal. No respiratory distress.      Breath sounds: Normal breath sounds.   Abdominal:      General: There is no distension.      Palpations: Abdomen is soft.      Tenderness: There is no abdominal tenderness.     Skin:     General: Skin is warm and dry.      Findings: No rash.     Neurological:      Mental Status: He is alert and oriented to person, place, and time.     Psychiatric:         Behavior: Behavior normal.                     [1]   Current Outpatient Medications:     acetaminophen (TYLENOL) 500 mg tablet, Take by mouth, Disp: , Rfl:     allopurinol (ZYLOPRIM) 300 mg tablet, Take 300 mg by mouth in the morning., Disp: , Rfl:     apixaban (Eliquis) 5 mg, TAKE 1 " TABLET EVERY 12 HOURS, Disp: 180 tablet, Rfl: 1    atorvastatin (LIPITOR) 40 mg tablet, TAKE 1 TABLET DAILY, Disp: 90 tablet, Rfl: 1    Cholecalciferol (VITAMIN D3 HIGH POTENCY PO), Take by mouth, Disp: , Rfl:     clopidogrel (PLAVIX) 75 mg tablet, TAKE 1 TABLET DAILY, Disp: 90 tablet, Rfl: 1    finasteride (PROSCAR) 5 mg tablet, Take by mouth, Disp: , Rfl:     irbesartan (AVAPRO) 300 mg tablet, Take 0.5 tablets (150 mg total) by mouth daily, Disp: 90 tablet, Rfl: 1    magnesium oxide (MAG-OX) 400 mg tablet, Take 400 mg by mouth, Disp: , Rfl:     metoprolol succinate (TOPROL-XL) 25 mg 24 hr tablet, Take 25 mg by mouth in the morning., Disp: , Rfl:     nitroglycerin (NITROSTAT) 0.4 mg SL tablet, , Disp: , Rfl:     pantoprazole (PROTONIX) 40 mg tablet, TAKE 1 TABLET DAILY, Disp: 90 tablet, Rfl: 0    potassium chloride (MICRO-K) 10 MEQ CR capsule, TAKE 1 CAPSULE DAILY, Disp: 90 capsule, Rfl: 1    sotalol (BETAPACE) 120 mg tablet, TAKE 1 TABLET TWICE A DAY, Disp: 180 tablet, Rfl: 3    tamsulosin (FLOMAX) 0.4 mg, Take by mouth, Disp: , Rfl:     tolterodine (DETROL LA) 4 mg 24 hr capsule, , Disp: , Rfl:

## 2025-05-19 ENCOUNTER — TELEPHONE (OUTPATIENT)
Dept: NON INVASIVE DIAGNOSTICS | Facility: HOSPITAL | Age: 75
End: 2025-05-19

## 2025-05-19 NOTE — TELEPHONE ENCOUNTER
Left detailed msg on home voicemail with the following instructions:  1). NPO after midnight Tues 5/20 into wed morning. We do want you to take your morning meds with a sip of water wed morning before coming to the hospital. Emphasized taking Eliquis wed morning.  2). Arrival time is 8am. You will need to have a  with you because you can not drive home after receiving anesthesia.  Asked patient to call [phone] with any questions or concerns.

## 2025-05-21 ENCOUNTER — ANESTHESIA EVENT (OUTPATIENT)
Dept: NON INVASIVE DIAGNOSTICS | Facility: HOSPITAL | Age: 75
End: 2025-05-21
Payer: MEDICARE

## 2025-05-21 ENCOUNTER — HOSPITAL ENCOUNTER (OUTPATIENT)
Dept: NON INVASIVE DIAGNOSTICS | Facility: HOSPITAL | Age: 75
Discharge: HOME | End: 2025-05-21
Attending: NURSE PRACTITIONER
Payer: MEDICARE

## 2025-05-21 ENCOUNTER — ANESTHESIA (OUTPATIENT)
Dept: NON INVASIVE DIAGNOSTICS | Facility: HOSPITAL | Age: 75
End: 2025-05-21
Payer: MEDICARE

## 2025-05-21 VITALS
RESPIRATION RATE: 20 BRPM | HEART RATE: 68 BPM | BODY MASS INDEX: 32.08 KG/M2 | WEIGHT: 250 LBS | TEMPERATURE: 97.3 F | HEIGHT: 74 IN | OXYGEN SATURATION: 98 % | DIASTOLIC BLOOD PRESSURE: 62 MMHG | SYSTOLIC BLOOD PRESSURE: 106 MMHG

## 2025-05-21 DIAGNOSIS — I48.92 ATRIAL FLUTTER, UNSPECIFIED TYPE (HCC): ICD-10-CM

## 2025-05-21 LAB
ATRIAL RATE: 214 BPM
ATRIAL RATE: 63 BPM
P AXIS: 77 DEGREES
PR INTERVAL: 258 MS
QRS AXIS: 41 DEGREES
QRS AXIS: 60 DEGREES
QRSD INTERVAL: 94 MS
QRSD INTERVAL: 94 MS
QT INTERVAL: 358 MS
QT INTERVAL: 448 MS
QTC INTERVAL: 458 MS
QTC INTERVAL: 477 MS
T WAVE AXIS: 42 DEGREES
T WAVE AXIS: 45 DEGREES
VENTRICULAR RATE: 107 BPM
VENTRICULAR RATE: 63 BPM

## 2025-05-21 PROCEDURE — 92960 CARDIOVERSION ELECTRIC EXT: CPT

## 2025-05-21 PROCEDURE — 93005 ELECTROCARDIOGRAM TRACING: CPT

## 2025-05-21 PROCEDURE — 93010 ELECTROCARDIOGRAM REPORT: CPT | Performed by: INTERNAL MEDICINE

## 2025-05-21 PROCEDURE — 92960 CARDIOVERSION ELECTRIC EXT: CPT | Performed by: INTERNAL MEDICINE

## 2025-05-21 RX ORDER — SODIUM CHLORIDE, SODIUM LACTATE, POTASSIUM CHLORIDE, CALCIUM CHLORIDE 600; 310; 30; 20 MG/100ML; MG/100ML; MG/100ML; MG/100ML
INJECTION, SOLUTION INTRAVENOUS CONTINUOUS PRN
Status: DISCONTINUED | OUTPATIENT
Start: 2025-05-21 | End: 2025-05-21

## 2025-05-21 RX ORDER — PHENYLEPHRINE HCL IN 0.9% NACL 1 MG/10 ML
SYRINGE (ML) INTRAVENOUS AS NEEDED
Status: DISCONTINUED | OUTPATIENT
Start: 2025-05-21 | End: 2025-05-21

## 2025-05-21 RX ORDER — LIDOCAINE HYDROCHLORIDE 10 MG/ML
INJECTION, SOLUTION EPIDURAL; INFILTRATION; INTRACAUDAL; PERINEURAL AS NEEDED
Status: DISCONTINUED | OUTPATIENT
Start: 2025-05-21 | End: 2025-05-21

## 2025-05-21 RX ORDER — PROPOFOL 10 MG/ML
INJECTION, EMULSION INTRAVENOUS AS NEEDED
Status: DISCONTINUED | OUTPATIENT
Start: 2025-05-21 | End: 2025-05-21

## 2025-05-21 RX ADMIN — PROPOFOL 100 MG: 10 INJECTION, EMULSION INTRAVENOUS at 08:54

## 2025-05-21 RX ADMIN — Medication 200 MCG: at 09:22

## 2025-05-21 RX ADMIN — LIDOCAINE HYDROCHLORIDE 50 MG: 10 INJECTION, SOLUTION EPIDURAL; INFILTRATION; INTRACAUDAL; PERINEURAL at 08:54

## 2025-05-21 RX ADMIN — Medication 200 MCG: at 09:04

## 2025-05-21 RX ADMIN — Medication 200 MCG: at 09:12

## 2025-05-21 RX ADMIN — SODIUM CHLORIDE, SODIUM LACTATE, POTASSIUM CHLORIDE, AND CALCIUM CHLORIDE: .6; .31; .03; .02 INJECTION, SOLUTION INTRAVENOUS at 08:49

## 2025-05-21 NOTE — ANESTHESIA POSTPROCEDURE EVALUATION
Post-Op Assessment Note    CV Status:  Stable    Pain management: adequate       Mental Status:  Awake and sleepy   Hydration Status:  Euvolemic   PONV Controlled:  Controlled   Airway Patency:  Patent  Two or more mitigation strategies used for obstructive sleep apnea   Post Op Vitals Reviewed: Yes    No anethesia notable event occurred.            Last Filed PACU Vitals:  Vitals Value Taken Time   Temp 97.3    Pulse 63 05/21/25 09:06   BP 91/53 05/21/25 09:06   Resp 17 05/21/25 09:06   SpO2 99 % 05/21/25 09:06   Vitals shown include unfiled device data.

## 2025-05-21 NOTE — ANESTHESIA PREPROCEDURE EVALUATION
Procedure:  CARDIOVERSION    Relevant Problems   CARDIO   (+) Atrial fibrillation and flutter (HCC)   (+) CAD (coronary artery disease)   (+) Carotid occlusion, right   (+) Dyspnea on exertion   (+) Hyperlipidemia   (+) Hypertension   (+) Non-rheumatic mitral regurgitation   (+) Peripheral vascular disease (HCC)      /RENAL   (+) Benign prostatic hyperplasia with lower urinary tract symptoms   (+) Stage 3a chronic kidney disease (HCC)      PULMONARY   (+) Dyspnea on exertion        Physical Exam    Airway     Mallampati score: II          Cardiovascular      Dental       Pulmonary      Neurological      Other Findings        Anesthesia Plan  ASA Score- 3     Anesthesia Type- IV sedation with anesthesia with ASA Monitors.         Additional Monitors:     Airway Plan:            Plan Factors-    Chart reviewed.                      Induction-     Postoperative Plan- .   Monitoring Plan - Monitoring plan - standard ASA monitoring      Perioperative Resuscitation Plan - Level 1 - Full Code.       Informed Consent- Anesthetic plan and risks discussed with patient.  I personally reviewed this patient with the CRNA. Discussed and agreed on the Anesthesia Plan with the CRNA..      NPO Status:  Vitals Value Taken Time   Date of last liquid 05/21/25 05/21/25 08:00   Time of last liquid 0700 05/21/25 08:00   Date of last solid 05/20/25 05/21/25 08:00   Time of last solid 1900 05/21/25 08:00

## 2025-05-21 NOTE — INTERVAL H&P NOTE
Update: (This section must be completed if the H&P was completed greater than 24 hrs to procedure or admission)    H&P reviewed. After examining the patient, I find no changed to the H&P since it had been written.  Persistent atrial flutter/fibrillation.  Patient is on appropriate oral anticoagulation.  Will proceed with cardioversion.    Patient re-evaluated. Accept as history and physical.    Danial Crowder MD/May 21, 2025/8:43 AM

## 2025-05-21 NOTE — DISCHARGE SUMMARY
Discharge Summary - Cardiology   Name: Dorian Restrepo 75 y.o. male I MRN: [id number]  Unit/Bed#:  I Date of Admission: 5/21/2025   Date of Service: 5/21/2025 I Hospital Day: 0    Patient with persistent atrial flutter despite sotalol.  He was scheduled for outpatient cardioversion.  Patient underwent successful cardioversion to normal sinus rhythm with a single 200 J biphasic shock.  No complications noted.  He will continue to follow-up with his primary electrophysiologist at [facility].

## 2025-05-21 NOTE — ANESTHESIA POSTPROCEDURE EVALUATION
Post-Op Assessment Note    CV Status:  Stable    Pain management: adequate       Mental Status:  Alert and awake   Hydration Status:  Euvolemic   PONV Controlled:  Controlled   Airway Patency:  Patent     Post Op Vitals Reviewed: Yes    No anethesia notable event occurred.    Staff: Anesthesiologist           Last Filed PACU Vitals:  Vitals Value Taken Time   Temp 97.2 °F (36.2 °C) 05/21/25 09:35   Pulse 67 05/21/25 09:35   /66 05/21/25 09:35   Resp 26 05/21/25 09:35   SpO2 97 % 05/21/25 09:35       Modified Rosemarie:     Vitals Value Taken Time   Activity 2 05/21/25 09:35   Respiration 2 05/21/25 09:35   Circulation 2 05/21/25 09:35   Consciousness 2 05/21/25 09:35   Oxygen Saturation 2 05/21/25 09:35     Modified Roesmarie Score: 10

## 2025-06-04 DIAGNOSIS — I25.10 CORONARY ARTERY DISEASE INVOLVING NATIVE CORONARY ARTERY OF NATIVE HEART WITHOUT ANGINA PECTORIS: ICD-10-CM

## 2025-06-04 RX ORDER — CLOPIDOGREL BISULFATE 75 MG/1
75 TABLET ORAL DAILY
Qty: 90 TABLET | Refills: 1 | Status: SHIPPED | OUTPATIENT
Start: 2025-06-04

## 2025-06-16 DIAGNOSIS — E78.2 MIXED HYPERLIPIDEMIA: ICD-10-CM

## 2025-06-16 DIAGNOSIS — I25.10 CORONARY ARTERY DISEASE INVOLVING NATIVE CORONARY ARTERY OF NATIVE HEART WITHOUT ANGINA PECTORIS: ICD-10-CM

## 2025-06-16 RX ORDER — ATORVASTATIN CALCIUM 40 MG/1
40 TABLET, FILM COATED ORAL DAILY
Qty: 90 TABLET | Refills: 1 | Status: SHIPPED | OUTPATIENT
Start: 2025-06-16

## 2025-07-17 ENCOUNTER — HOSPITAL ENCOUNTER (OUTPATIENT)
Dept: NON INVASIVE DIAGNOSTICS | Facility: HOSPITAL | Age: 75
Discharge: HOME | End: 2025-07-17
Payer: MEDICARE

## 2025-07-17 DIAGNOSIS — I65.21 CAROTID OCCLUSION, RIGHT: ICD-10-CM

## 2025-07-17 DIAGNOSIS — I73.9 PERIPHERAL VASCULAR DISEASE (HCC): ICD-10-CM

## 2025-07-17 PROCEDURE — 93923 UPR/LXTR ART STDY 3+ LVLS: CPT

## 2025-07-17 PROCEDURE — 93923 UPR/LXTR ART STDY 3+ LVLS: CPT | Performed by: SURGERY

## 2025-07-17 PROCEDURE — 93925 LOWER EXTREMITY STUDY: CPT

## 2025-07-17 PROCEDURE — 93880 EXTRACRANIAL BILAT STUDY: CPT

## 2025-07-17 PROCEDURE — 93925 LOWER EXTREMITY STUDY: CPT | Performed by: SURGERY

## 2025-07-18 ENCOUNTER — TELEPHONE (OUTPATIENT)
Dept: VASCULAR SURGERY | Facility: CLINIC | Age: 75
End: 2025-07-18

## 2025-07-18 PROCEDURE — 93880 EXTRACRANIAL BILAT STUDY: CPT | Performed by: SURGERY

## 2025-07-18 NOTE — TELEPHONE ENCOUNTER
Attempted to contact patient to schedule appointment(s) listed below.  Requested patient call [phone] to schedule appointment(s).    Patient's appointment(s) are due now.    Dopplers  [] Abdominal Aorta Iliac (AOIL)  [] Carotid (CV)   [] Celiac and/or Mesenteric  [] Endovascular Aortic Repair (EVAR)   [] Hemodialysis Access (HD)   [x] Lower Limb Arterial (OVIDIO) 7/17/25  [] Lower Limb Venous (LEV)  [] Lower Limb Venous Duplex with Reflux (LEVDR)  [] Renal Artery  [] Upper Limb Arterial (UEA)    [] Upper Limb Venous (UEV)              [] MARISOL and Waveform analysis     Advanced Imaging   [] CTA head/neck    [] CTA abdomen    [] CTA abdomen & pelvis    [] CT abdomen with/ without contrast  [] CT abdomen with contrast  [] CT abdomen without contrast    [] CT abdomen & pelvis with/ without contrast  [] CT abdomen & pelvis with contrast  [] CT abdomen & pelvis without contrast    Office Visit   [] New patient, patient last seen over 3 years ago  [] Risk factor modification (RFM)   [x] Follow up 1 yr f/u  [] Lost to follow up (LTFU)

## 2025-07-19 ENCOUNTER — RESULTS FOLLOW-UP (OUTPATIENT)
Dept: OTHER | Facility: HOSPITAL | Age: 75
End: 2025-07-19

## 2025-08-04 DIAGNOSIS — I10 ESSENTIAL HYPERTENSION: ICD-10-CM

## 2025-08-04 DIAGNOSIS — K21.9 GASTROESOPHAGEAL REFLUX DISEASE WITHOUT ESOPHAGITIS: ICD-10-CM

## 2025-08-05 RX ORDER — PANTOPRAZOLE SODIUM 40 MG/1
40 TABLET, DELAYED RELEASE ORAL DAILY
Qty: 90 TABLET | Refills: 1 | Status: SHIPPED | OUTPATIENT
Start: 2025-08-05

## 2025-08-14 DIAGNOSIS — I48.0 PAF (PAROXYSMAL ATRIAL FIBRILLATION) (HCC): ICD-10-CM

## 2025-08-19 RX ORDER — SOTALOL HYDROCHLORIDE 120 MG/1
120 TABLET ORAL 2 TIMES DAILY
Qty: 180 TABLET | Refills: 0 | Status: SHIPPED | OUTPATIENT
Start: 2025-08-19